# Patient Record
Sex: FEMALE | Race: WHITE | NOT HISPANIC OR LATINO | Employment: FULL TIME | URBAN - METROPOLITAN AREA
[De-identification: names, ages, dates, MRNs, and addresses within clinical notes are randomized per-mention and may not be internally consistent; named-entity substitution may affect disease eponyms.]

---

## 2017-01-04 RX ORDER — IBUPROFEN 400 MG/1
TABLET ORAL
Qty: 120 TABLET | Refills: 0 | OUTPATIENT
Start: 2017-01-04

## 2017-01-11 RX ORDER — IBUPROFEN 400 MG/1
TABLET ORAL
Qty: 120 TABLET | Refills: 0 | OUTPATIENT
Start: 2017-01-11

## 2017-02-20 ENCOUNTER — TELEPHONE (OUTPATIENT)
Dept: INTERNAL MEDICINE | Age: 52
End: 2017-02-20

## 2017-02-20 DIAGNOSIS — M54.30 SCIATICA, UNSPECIFIED LATERALITY: ICD-10-CM

## 2017-02-20 DIAGNOSIS — M48.00 SPINAL STENOSIS, UNSPECIFIED SPINAL REGION: Primary | ICD-10-CM

## 2017-02-20 DIAGNOSIS — M13.0 HYPERTROPHIC POLYARTHRITIS: ICD-10-CM

## 2017-02-20 NOTE — TELEPHONE ENCOUNTER
PA for diclofenac 1% gel denied via [employer]. Preferred alternative Brand name Voltaren 1% gel ok'd per Dr. Benedict.    KD

## 2017-10-27 ENCOUNTER — OFFICE (OUTPATIENT)
Dept: URBAN - METROPOLITAN AREA CLINIC 64 | Facility: CLINIC | Age: 52
End: 2017-10-27

## 2017-10-27 VITALS
SYSTOLIC BLOOD PRESSURE: 109 MMHG | DIASTOLIC BLOOD PRESSURE: 71 MMHG | WEIGHT: 199 LBS | HEART RATE: 80 BPM | HEIGHT: 68 IN

## 2017-10-27 DIAGNOSIS — K59.00 CONSTIPATION, UNSPECIFIED: ICD-10-CM

## 2017-10-27 DIAGNOSIS — K62.5 HEMORRHAGE OF ANUS AND RECTUM: ICD-10-CM

## 2017-10-27 DIAGNOSIS — K64.4 RESIDUAL HEMORRHOIDAL SKIN TAGS: ICD-10-CM

## 2017-10-27 PROCEDURE — 99203 OFFICE O/P NEW LOW 30 MIN: CPT | Performed by: NURSE PRACTITIONER

## 2017-11-09 ENCOUNTER — ON CAMPUS - OUTPATIENT (OUTPATIENT)
Dept: URBAN - METROPOLITAN AREA HOSPITAL 85 | Facility: HOSPITAL | Age: 52
End: 2017-11-09
Payer: COMMERCIAL

## 2017-11-09 ENCOUNTER — HOSPITAL ENCOUNTER (OUTPATIENT)
Dept: PREOP | Facility: HOSPITAL | Age: 52
Setting detail: HOSPITAL OUTPATIENT SURGERY
Discharge: HOME | End: 2017-11-09
Attending: INTERNAL MEDICINE | Admitting: INTERNAL MEDICINE

## 2017-11-09 DIAGNOSIS — K62.1 RECTAL POLYP: ICD-10-CM

## 2017-11-09 DIAGNOSIS — K64.4 RESIDUAL HEMORRHOIDAL SKIN TAGS: ICD-10-CM

## 2017-11-09 DIAGNOSIS — K64.8 OTHER HEMORRHOIDS: ICD-10-CM

## 2017-11-09 DIAGNOSIS — Z12.11 ENCOUNTER FOR SCREENING FOR MALIGNANT NEOPLASM OF COLON: ICD-10-CM

## 2017-11-09 PROCEDURE — 45385 COLONOSCOPY W/LESION REMOVAL: CPT | Performed by: INTERNAL MEDICINE

## 2018-05-23 ENCOUNTER — HOSPITAL ENCOUNTER (OUTPATIENT)
Dept: LAB | Facility: HOSPITAL | Age: 53
Discharge: HOME | End: 2018-05-23
Attending: OBSTETRICS & GYNECOLOGY | Admitting: OBSTETRICS & GYNECOLOGY

## 2018-05-24 ENCOUNTER — HOSPITAL ENCOUNTER (OUTPATIENT)
Dept: LAB | Facility: HOSPITAL | Age: 53
Discharge: HOME | End: 2018-05-24
Attending: OBSTETRICS & GYNECOLOGY | Admitting: OBSTETRICS & GYNECOLOGY

## 2018-05-24 LAB
BASOPHILS # BLD AUTO: 0 10*3/UL (ref 0–0.2)
BASOPHILS NFR BLD AUTO: 0 % (ref 0–2)
DIFFERENTIAL METHOD BLD: (no result)
EOSINOPHIL # BLD AUTO: 0.3 10*3/UL (ref 0–0.3)
EOSINOPHIL # BLD AUTO: 3 % (ref 0–3)
ERYTHROCYTE [DISTWIDTH] IN BLOOD BY AUTOMATED COUNT: 14.3 % (ref 11.5–14.5)
HCT VFR BLD AUTO: 41.6 % (ref 35–49)
HGB BLD-MCNC: 14 G/DL (ref 12–15)
LYMPHOCYTES # BLD AUTO: 2.8 10*3/UL (ref 0.8–4.8)
LYMPHOCYTES NFR BLD AUTO: 26 % (ref 18–42)
MCH RBC QN AUTO: 31.2 PG (ref 26–32)
MCHC RBC AUTO-ENTMCNC: 33.6 G/DL (ref 32–36)
MCV RBC AUTO: 92.9 FL (ref 80–94)
MONOCYTES # BLD AUTO: 0.7 10*3/UL (ref 0.1–1.3)
MONOCYTES NFR BLD AUTO: 7 % (ref 2–11)
NEUTROPHILS # BLD AUTO: 7 10*3/UL (ref 2.3–8.6)
NEUTROPHILS NFR BLD AUTO: 64 % (ref 50–75)
NRBC BLD AUTO-RTO: 0 /100{WBCS}
NRBC/RBC NFR BLD MANUAL: 0 10*3/UL
PLATELET # BLD AUTO: 299 10*3/UL (ref 150–450)
PMV BLD AUTO: 8.4 FL (ref 7.4–10.4)
RBC # BLD AUTO: 4.48 10*6/UL (ref 4–5.4)
WBC # BLD AUTO: 10.9 10*3/UL (ref 4.5–11.5)

## 2018-06-08 ENCOUNTER — APPOINTMENT (OUTPATIENT)
Dept: WOMENS IMAGING | Facility: HOSPITAL | Age: 53
End: 2018-06-08

## 2018-06-08 PROCEDURE — 77067 SCR MAMMO BI INCL CAD: CPT | Performed by: RADIOLOGY

## 2020-06-29 ENCOUNTER — LAB (OUTPATIENT)
Dept: LAB | Facility: HOSPITAL | Age: 55
End: 2020-06-29

## 2020-06-29 ENCOUNTER — TRANSCRIBE ORDERS (OUTPATIENT)
Dept: LAB | Facility: HOSPITAL | Age: 55
End: 2020-06-29

## 2020-06-29 DIAGNOSIS — N92.1 IRREGULAR INTERMENSTRUAL BLEEDING: Primary | ICD-10-CM

## 2020-06-29 DIAGNOSIS — N92.1 IRREGULAR INTERMENSTRUAL BLEEDING: ICD-10-CM

## 2020-06-29 LAB
BASOPHILS # BLD AUTO: 0.06 10*3/MM3 (ref 0–0.2)
BASOPHILS NFR BLD AUTO: 0.4 % (ref 0–1.5)
DEPRECATED RDW RBC AUTO: 44.2 FL (ref 37–54)
EOSINOPHIL # BLD AUTO: 0.2 10*3/MM3 (ref 0–0.4)
EOSINOPHIL NFR BLD AUTO: 1.5 % (ref 0.3–6.2)
ERYTHROCYTE [DISTWIDTH] IN BLOOD BY AUTOMATED COUNT: 13.1 % (ref 12.3–15.4)
HCT VFR BLD AUTO: 43.4 % (ref 34–46.6)
HGB BLD-MCNC: 14.9 G/DL (ref 12–15.9)
IMM GRANULOCYTES # BLD AUTO: 0.1 10*3/MM3 (ref 0–0.05)
IMM GRANULOCYTES NFR BLD AUTO: 0.7 % (ref 0–0.5)
LYMPHOCYTES # BLD AUTO: 2.99 10*3/MM3 (ref 0.7–3.1)
LYMPHOCYTES NFR BLD AUTO: 21.7 % (ref 19.6–45.3)
MCH RBC QN AUTO: 31.8 PG (ref 26.6–33)
MCHC RBC AUTO-ENTMCNC: 34.3 G/DL (ref 31.5–35.7)
MCV RBC AUTO: 92.7 FL (ref 79–97)
MONOCYTES # BLD AUTO: 0.73 10*3/MM3 (ref 0.1–0.9)
MONOCYTES NFR BLD AUTO: 5.3 % (ref 5–12)
NEUTROPHILS NFR BLD AUTO: 70.4 % (ref 42.7–76)
NEUTROPHILS NFR BLD AUTO: 9.67 10*3/MM3 (ref 1.7–7)
NRBC BLD AUTO-RTO: 0 /100 WBC (ref 0–0.2)
PLATELET # BLD AUTO: 287 10*3/MM3 (ref 140–450)
PMV BLD AUTO: 12.1 FL (ref 6–12)
RBC # BLD AUTO: 4.68 10*6/MM3 (ref 3.77–5.28)
WBC # BLD AUTO: 13.75 10*3/MM3 (ref 3.4–10.8)

## 2020-06-29 PROCEDURE — 36415 COLL VENOUS BLD VENIPUNCTURE: CPT

## 2020-06-29 PROCEDURE — 85025 COMPLETE CBC W/AUTO DIFF WBC: CPT

## 2020-07-14 ENCOUNTER — APPOINTMENT (OUTPATIENT)
Dept: WOMENS IMAGING | Facility: HOSPITAL | Age: 55
End: 2020-07-14

## 2020-07-14 PROCEDURE — 77063 BREAST TOMOSYNTHESIS BI: CPT | Performed by: RADIOLOGY

## 2020-07-14 PROCEDURE — 77067 SCR MAMMO BI INCL CAD: CPT | Performed by: RADIOLOGY

## 2020-08-22 ENCOUNTER — HOSPITAL ENCOUNTER (INPATIENT)
Facility: HOSPITAL | Age: 55
LOS: 2 days | Discharge: HOME | End: 2020-08-24
Attending: EMERGENCY MEDICINE | Admitting: INTERNAL MEDICINE

## 2020-08-22 DIAGNOSIS — N39.0 URINARY TRACT INFECTION WITHOUT HEMATURIA, SITE UNSPECIFIED: ICD-10-CM

## 2020-08-22 DIAGNOSIS — E11.9 DIABETES MELLITUS, NEW ONSET (HCC): Primary | ICD-10-CM

## 2020-08-22 LAB
ALBUMIN SERPL-MCNC: 4.1 G/DL (ref 3.5–5.2)
ALBUMIN/GLOB SERPL: 1.7 G/DL
ALP SERPL-CCNC: 110 U/L (ref 39–117)
ALT SERPL W P-5'-P-CCNC: 11 U/L (ref 1–33)
ANION GAP SERPL CALCULATED.3IONS-SCNC: 19 MMOL/L (ref 5–15)
AST SERPL-CCNC: 11 U/L (ref 1–32)
ATMOSPHERIC PRESS: ABNORMAL MM[HG]
BACTERIA UR QL AUTO: ABNORMAL /HPF
BASE EXCESS BLDV CALC-SCNC: -3 MMOL/L
BASOPHILS # BLD AUTO: 0.1 10*3/MM3 (ref 0–0.2)
BASOPHILS NFR BLD AUTO: 0.8 % (ref 0–1.5)
BDY SITE: ABNORMAL
BILIRUB SERPL-MCNC: 0.3 MG/DL (ref 0–1.2)
BILIRUB UR QL STRIP: NEGATIVE
BUN SERPL-MCNC: 13 MG/DL (ref 6–20)
BUN SERPL-MCNC: ABNORMAL MG/DL
BUN/CREAT SERPL: ABNORMAL
CALCIUM SPEC-SCNC: 9.2 MG/DL (ref 8.6–10.5)
CHLORIDE SERPL-SCNC: 90 MMOL/L (ref 98–107)
CLARITY UR: CLEAR
CO2 BLDA-SCNC: 21.9 MMOL/L (ref 22–29)
CO2 SERPL-SCNC: 18 MMOL/L (ref 22–29)
COLOR UR: YELLOW
CREAT SERPL-MCNC: 0.65 MG/DL (ref 0.57–1)
DEPRECATED RDW RBC AUTO: 44.2 FL (ref 37–54)
EOSINOPHIL # BLD AUTO: 0.3 10*3/MM3 (ref 0–0.4)
EOSINOPHIL NFR BLD AUTO: 2.5 % (ref 0.3–6.2)
ERYTHROCYTE [DISTWIDTH] IN BLOOD BY AUTOMATED COUNT: 13.7 % (ref 12.3–15.4)
GFR SERPL CREATININE-BSD FRML MDRD: 95 ML/MIN/1.73
GLOBULIN UR ELPH-MCNC: 2.4 GM/DL
GLUCOSE BLDC GLUCOMTR-MCNC: 339 MG/DL (ref 70–105)
GLUCOSE BLDC GLUCOMTR-MCNC: 437 MG/DL (ref 70–105)
GLUCOSE SERPL-MCNC: 415 MG/DL (ref 65–99)
GLUCOSE UR STRIP-MCNC: ABNORMAL MG/DL
HCO3 BLDV-SCNC: 20.9 MMOL/L
HCT VFR BLD AUTO: 46.7 % (ref 34–46.6)
HGB BLD-MCNC: 16 G/DL (ref 12–15.9)
HGB UR QL STRIP.AUTO: NEGATIVE
HYALINE CASTS UR QL AUTO: ABNORMAL /LPF
KETONES UR QL STRIP: ABNORMAL
LEUKOCYTE ESTERASE UR QL STRIP.AUTO: NEGATIVE
LYMPHOCYTES # BLD AUTO: 3.5 10*3/MM3 (ref 0.7–3.1)
LYMPHOCYTES NFR BLD AUTO: 30.3 % (ref 19.6–45.3)
MCH RBC QN AUTO: 31.4 PG (ref 26.6–33)
MCHC RBC AUTO-ENTMCNC: 34.2 G/DL (ref 31.5–35.7)
MCV RBC AUTO: 91.9 FL (ref 79–97)
MODALITY: ABNORMAL
MONOCYTES # BLD AUTO: 0.7 10*3/MM3 (ref 0.1–0.9)
MONOCYTES NFR BLD AUTO: 6 % (ref 5–12)
NEUTROPHILS NFR BLD AUTO: 6.9 10*3/MM3 (ref 1.7–7)
NEUTROPHILS NFR BLD AUTO: 60.4 % (ref 42.7–76)
NITRITE UR QL STRIP: NEGATIVE
NRBC BLD AUTO-RTO: 0.1 /100 WBC (ref 0–0.2)
PCO2 BLDV: 33.6 MM HG (ref 42–51)
PH BLDV: 7.4 PH UNITS (ref 7.32–7.43)
PH UR STRIP.AUTO: <=5 [PH] (ref 5–8)
PLATELET # BLD AUTO: 311 10*3/MM3 (ref 140–450)
PMV BLD AUTO: 8.9 FL (ref 6–12)
PO2 BLDV: 42.2 MM HG
POTASSIUM SERPL-SCNC: 3.8 MMOL/L (ref 3.5–5.2)
PROT SERPL-MCNC: 6.5 G/DL (ref 6–8.5)
PROT UR QL STRIP: ABNORMAL
RBC # BLD AUTO: 5.08 10*6/MM3 (ref 3.77–5.28)
RBC # UR: ABNORMAL /HPF
REF LAB TEST METHOD: ABNORMAL
SAO2 % BLDCOV: 78.3 %
SODIUM SERPL-SCNC: 127 MMOL/L (ref 136–145)
SP GR UR STRIP: 1.04 (ref 1–1.03)
SQUAMOUS #/AREA URNS HPF: ABNORMAL /HPF
TROPONIN T SERPL-MCNC: <0.01 NG/ML (ref 0–0.03)
UROBILINOGEN UR QL STRIP: ABNORMAL
WBC # BLD AUTO: 11.5 10*3/MM3 (ref 3.4–10.8)
WBC UR QL AUTO: ABNORMAL /HPF

## 2020-08-22 PROCEDURE — 82962 GLUCOSE BLOOD TEST: CPT

## 2020-08-22 PROCEDURE — 25010000002 CEFTRIAXONE PER 250 MG: Performed by: EMERGENCY MEDICINE

## 2020-08-22 PROCEDURE — 81001 URINALYSIS AUTO W/SCOPE: CPT | Performed by: EMERGENCY MEDICINE

## 2020-08-22 PROCEDURE — 63710000001 INSULIN REGULAR HUMAN PER 5 UNITS: Performed by: EMERGENCY MEDICINE

## 2020-08-22 PROCEDURE — 36415 COLL VENOUS BLD VENIPUNCTURE: CPT

## 2020-08-22 PROCEDURE — 99222 1ST HOSP IP/OBS MODERATE 55: CPT | Performed by: NURSE PRACTITIONER

## 2020-08-22 PROCEDURE — 99284 EMERGENCY DEPT VISIT MOD MDM: CPT

## 2020-08-22 PROCEDURE — 84484 ASSAY OF TROPONIN QUANT: CPT | Performed by: EMERGENCY MEDICINE

## 2020-08-22 PROCEDURE — 82803 BLOOD GASES ANY COMBINATION: CPT

## 2020-08-22 PROCEDURE — 80053 COMPREHEN METABOLIC PANEL: CPT | Performed by: EMERGENCY MEDICINE

## 2020-08-22 PROCEDURE — 83036 HEMOGLOBIN GLYCOSYLATED A1C: CPT | Performed by: NURSE PRACTITIONER

## 2020-08-22 PROCEDURE — 85025 COMPLETE CBC W/AUTO DIFF WBC: CPT | Performed by: EMERGENCY MEDICINE

## 2020-08-22 PROCEDURE — 87086 URINE CULTURE/COLONY COUNT: CPT | Performed by: EMERGENCY MEDICINE

## 2020-08-22 PROCEDURE — 93005 ELECTROCARDIOGRAM TRACING: CPT | Performed by: EMERGENCY MEDICINE

## 2020-08-22 RX ORDER — ONDANSETRON 2 MG/ML
4 INJECTION INTRAMUSCULAR; INTRAVENOUS EVERY 6 HOURS PRN
Status: DISCONTINUED | OUTPATIENT
Start: 2020-08-22 | End: 2020-08-24 | Stop reason: HOSPADM

## 2020-08-22 RX ORDER — NICOTINE POLACRILEX 4 MG
15 LOZENGE BUCCAL
Status: DISCONTINUED | OUTPATIENT
Start: 2020-08-22 | End: 2020-08-24 | Stop reason: HOSPADM

## 2020-08-22 RX ORDER — SODIUM CHLORIDE 0.9 % (FLUSH) 0.9 %
10 SYRINGE (ML) INJECTION AS NEEDED
Status: DISCONTINUED | OUTPATIENT
Start: 2020-08-22 | End: 2020-08-24 | Stop reason: HOSPADM

## 2020-08-22 RX ORDER — ACETAMINOPHEN 160 MG/5ML
650 SOLUTION ORAL EVERY 4 HOURS PRN
Status: DISCONTINUED | OUTPATIENT
Start: 2020-08-22 | End: 2020-08-24 | Stop reason: HOSPADM

## 2020-08-22 RX ORDER — CHOLECALCIFEROL (VITAMIN D3) 125 MCG
5 CAPSULE ORAL NIGHTLY PRN
Status: DISCONTINUED | OUTPATIENT
Start: 2020-08-22 | End: 2020-08-24 | Stop reason: HOSPADM

## 2020-08-22 RX ORDER — ALUMINA, MAGNESIA, AND SIMETHICONE 2400; 2400; 240 MG/30ML; MG/30ML; MG/30ML
15 SUSPENSION ORAL EVERY 6 HOURS PRN
Status: DISCONTINUED | OUTPATIENT
Start: 2020-08-22 | End: 2020-08-24 | Stop reason: HOSPADM

## 2020-08-22 RX ORDER — SODIUM CHLORIDE 0.9 % (FLUSH) 0.9 %
10 SYRINGE (ML) INJECTION EVERY 12 HOURS SCHEDULED
Status: DISCONTINUED | OUTPATIENT
Start: 2020-08-22 | End: 2020-08-24 | Stop reason: HOSPADM

## 2020-08-22 RX ORDER — ADHESIVE TAPE 3"X 2.3 YD
200 TAPE, NON-MEDICATED TOPICAL DAILY
COMMUNITY
End: 2021-03-18

## 2020-08-22 RX ORDER — DEXTROSE MONOHYDRATE 25 G/50ML
25 INJECTION, SOLUTION INTRAVENOUS
Status: DISCONTINUED | OUTPATIENT
Start: 2020-08-22 | End: 2020-08-24 | Stop reason: HOSPADM

## 2020-08-22 RX ORDER — ACETAMINOPHEN 650 MG/1
650 SUPPOSITORY RECTAL EVERY 4 HOURS PRN
Status: DISCONTINUED | OUTPATIENT
Start: 2020-08-22 | End: 2020-08-24 | Stop reason: HOSPADM

## 2020-08-22 RX ORDER — ACETAMINOPHEN 325 MG/1
650 TABLET ORAL EVERY 4 HOURS PRN
Status: DISCONTINUED | OUTPATIENT
Start: 2020-08-22 | End: 2020-08-24 | Stop reason: HOSPADM

## 2020-08-22 RX ORDER — ONDANSETRON 4 MG/1
4 TABLET, FILM COATED ORAL EVERY 6 HOURS PRN
Status: DISCONTINUED | OUTPATIENT
Start: 2020-08-22 | End: 2020-08-24 | Stop reason: HOSPADM

## 2020-08-22 RX ORDER — BISACODYL 5 MG/1
5 TABLET, DELAYED RELEASE ORAL DAILY PRN
Status: DISCONTINUED | OUTPATIENT
Start: 2020-08-22 | End: 2020-08-24 | Stop reason: HOSPADM

## 2020-08-22 RX ADMIN — SODIUM CHLORIDE 1000 ML: 900 INJECTION, SOLUTION INTRAVENOUS at 22:18

## 2020-08-22 RX ADMIN — WATER 1 G: 1000 INJECTION, SOLUTION INTRAVENOUS at 22:30

## 2020-08-22 RX ADMIN — SODIUM CHLORIDE 1000 ML: 900 INJECTION, SOLUTION INTRAVENOUS at 19:50

## 2020-08-22 RX ADMIN — INSULIN HUMAN 8 UNITS: 100 INJECTION, SOLUTION PARENTERAL at 22:17

## 2020-08-23 LAB
ANION GAP SERPL CALCULATED.3IONS-SCNC: 16 MMOL/L (ref 5–15)
BUN SERPL-MCNC: 11 MG/DL (ref 6–20)
BUN SERPL-MCNC: ABNORMAL MG/DL
BUN/CREAT SERPL: ABNORMAL
CALCIUM SPEC-SCNC: 7.9 MG/DL (ref 8.6–10.5)
CHLORIDE SERPL-SCNC: 100 MMOL/L (ref 98–107)
CO2 SERPL-SCNC: 18 MMOL/L (ref 22–29)
CREAT SERPL-MCNC: 0.56 MG/DL (ref 0.57–1)
DEPRECATED RDW RBC AUTO: 45.1 FL (ref 37–54)
EOSINOPHIL # BLD MANUAL: 0.56 10*3/MM3 (ref 0–0.4)
EOSINOPHIL NFR BLD MANUAL: 5 % (ref 0.3–6.2)
ERYTHROCYTE [DISTWIDTH] IN BLOOD BY AUTOMATED COUNT: 13.9 % (ref 12.3–15.4)
GFR SERPL CREATININE-BSD FRML MDRD: 113 ML/MIN/1.73
GLUCOSE BLDC GLUCOMTR-MCNC: 275 MG/DL (ref 70–105)
GLUCOSE BLDC GLUCOMTR-MCNC: 281 MG/DL (ref 70–105)
GLUCOSE BLDC GLUCOMTR-MCNC: 281 MG/DL (ref 70–105)
GLUCOSE BLDC GLUCOMTR-MCNC: 295 MG/DL (ref 70–105)
GLUCOSE BLDC GLUCOMTR-MCNC: 334 MG/DL (ref 70–105)
GLUCOSE BLDC GLUCOMTR-MCNC: 385 MG/DL (ref 70–105)
GLUCOSE SERPL-MCNC: 299 MG/DL (ref 65–99)
HCT VFR BLD AUTO: 39.6 % (ref 34–46.6)
HGB BLD-MCNC: 13.3 G/DL (ref 12–15.9)
KETONES UR QL STRIP: ABNORMAL
LARGE PLATELETS: ABNORMAL
LYMPHOCYTES # BLD MANUAL: 2.69 10*3/MM3 (ref 0.7–3.1)
LYMPHOCYTES NFR BLD MANUAL: 24 % (ref 19.6–45.3)
LYMPHOCYTES NFR BLD MANUAL: 5 % (ref 5–12)
MCH RBC QN AUTO: 30.9 PG (ref 26.6–33)
MCHC RBC AUTO-ENTMCNC: 33.5 G/DL (ref 31.5–35.7)
MCV RBC AUTO: 92.1 FL (ref 79–97)
MONOCYTES # BLD AUTO: 0.56 10*3/MM3 (ref 0.1–0.9)
NEUTROPHILS # BLD AUTO: 7.39 10*3/MM3 (ref 1.7–7)
NEUTROPHILS NFR BLD MANUAL: 58 % (ref 42.7–76)
NEUTS BAND NFR BLD MANUAL: 8 % (ref 0–5)
PLATELET # BLD AUTO: 225 10*3/MM3 (ref 140–450)
PMV BLD AUTO: 8.8 FL (ref 6–12)
POTASSIUM SERPL-SCNC: 3.6 MMOL/L (ref 3.5–5.2)
RBC # BLD AUTO: 4.29 10*6/MM3 (ref 3.77–5.28)
RBC MORPH BLD: NORMAL
SCAN SLIDE: NORMAL
SODIUM SERPL-SCNC: 134 MMOL/L (ref 136–145)
TOXIC GRANULATION: ABNORMAL
WBC # BLD AUTO: 11.2 10*3/MM3 (ref 3.4–10.8)

## 2020-08-23 PROCEDURE — 85025 COMPLETE CBC W/AUTO DIFF WBC: CPT | Performed by: NURSE PRACTITIONER

## 2020-08-23 PROCEDURE — 99232 SBSQ HOSP IP/OBS MODERATE 35: CPT | Performed by: INTERNAL MEDICINE

## 2020-08-23 PROCEDURE — 25010000002 CEFTRIAXONE PER 250 MG: Performed by: INTERNAL MEDICINE

## 2020-08-23 PROCEDURE — 82962 GLUCOSE BLOOD TEST: CPT

## 2020-08-23 PROCEDURE — 63710000001 INSULIN LISPRO (HUMAN) PER 5 UNITS: Performed by: NURSE PRACTITIONER

## 2020-08-23 PROCEDURE — 63710000001 INSULIN LISPRO (HUMAN) PER 5 UNITS: Performed by: INTERNAL MEDICINE

## 2020-08-23 PROCEDURE — 81003 URINALYSIS AUTO W/O SCOPE: CPT | Performed by: INTERNAL MEDICINE

## 2020-08-23 PROCEDURE — 80048 BASIC METABOLIC PNL TOTAL CA: CPT | Performed by: NURSE PRACTITIONER

## 2020-08-23 PROCEDURE — 63710000001 INSULIN GLARGINE PER 5 UNITS: Performed by: INTERNAL MEDICINE

## 2020-08-23 PROCEDURE — 85007 BL SMEAR W/DIFF WBC COUNT: CPT | Performed by: NURSE PRACTITIONER

## 2020-08-23 PROCEDURE — 99253 IP/OBS CNSLTJ NEW/EST LOW 45: CPT | Performed by: INTERNAL MEDICINE

## 2020-08-23 RX ORDER — SODIUM CHLORIDE 9 MG/ML
75 INJECTION, SOLUTION INTRAVENOUS CONTINUOUS
Status: DISCONTINUED | OUTPATIENT
Start: 2020-08-23 | End: 2020-08-24 | Stop reason: HOSPADM

## 2020-08-23 RX ORDER — INSULIN GLARGINE 100 [IU]/ML
15 INJECTION, SOLUTION SUBCUTANEOUS EVERY MORNING
Status: DISCONTINUED | OUTPATIENT
Start: 2020-08-23 | End: 2020-08-24 | Stop reason: HOSPADM

## 2020-08-23 RX ORDER — INSULIN GLARGINE 100 [IU]/ML
10 INJECTION, SOLUTION SUBCUTANEOUS NIGHTLY
Status: DISCONTINUED | OUTPATIENT
Start: 2020-08-23 | End: 2020-08-23

## 2020-08-23 RX ADMIN — INSULIN LISPRO 5 UNITS: 100 INJECTION, SOLUTION INTRAVENOUS; SUBCUTANEOUS at 17:24

## 2020-08-23 RX ADMIN — Medication 10 ML: at 20:23

## 2020-08-23 RX ADMIN — SODIUM CHLORIDE 75 ML/HR: 900 INJECTION, SOLUTION INTRAVENOUS at 02:48

## 2020-08-23 RX ADMIN — Medication 10 ML: at 07:48

## 2020-08-23 RX ADMIN — INSULIN LISPRO 6 UNITS: 100 INJECTION, SOLUTION INTRAVENOUS; SUBCUTANEOUS at 07:47

## 2020-08-23 RX ADMIN — INSULIN GLARGINE 15 UNITS: 100 INJECTION, SOLUTION SUBCUTANEOUS at 14:43

## 2020-08-23 RX ADMIN — INSULIN LISPRO 6 UNITS: 100 INJECTION, SOLUTION INTRAVENOUS; SUBCUTANEOUS at 12:05

## 2020-08-23 RX ADMIN — INSULIN LISPRO 6 UNITS: 100 INJECTION, SOLUTION INTRAVENOUS; SUBCUTANEOUS at 17:25

## 2020-08-23 RX ADMIN — CEFTRIAXONE SODIUM 1 G: 1 INJECTION, POWDER, FOR SOLUTION INTRAMUSCULAR; INTRAVENOUS at 20:23

## 2020-08-23 RX ADMIN — SODIUM CHLORIDE 75 ML/HR: 900 INJECTION, SOLUTION INTRAVENOUS at 16:41

## 2020-08-23 RX ADMIN — BISACODYL 5 MG: 5 TABLET, COATED ORAL at 20:23

## 2020-08-23 NOTE — H&P
[facility] Medicine Services      Patient Name: Yadira Car  : 1965  MRN: [id number]  Primary Care Physician: Josesito, No Known  Date of admission: 2020    Patient Care Team:  Provider, No Known as PCP - General          Subjective   History Present Illness     Chief Complaint:   Chief Complaint   Patient presents with   • Hyperglycemia         Ms. Car is a 54 y.o. female with a history of abnormal uterine bleeding who was recently placed on progesterone p.o. presented to the [facility] ED on 2020 with a complaint of hyperglycemia, polydipsia and polyuria.  Patient states that she was on progesterone x21 days per her OB/GYN, for abnormal uterine bleeding.  Patient states she has had increased thirst and increased urination since she has been on progesterone.  Patient states she took her blood sugar at a friend's house yesterday and the glucometer read high.  Patient does have a family history of diabetes on her mom's side.  Patient denies fever, chills, nausea, vomiting, diarrhea, or ill contacts.    In the ED, glucose 437, troponin<0.01, sodium 127, potassium 3.8, CO2 18.0, anion gap 19.0, BUN 13, creatinine 0.65, WBC 11.5, Hgb 16.0, HCT 46.7, platelets 311.  UA specific gravity 1.041, glucose 3+, ketones 2+, protein 2+, 21-30 WBC, trace bacteria, urine culture pending.  EKG: Normal sinus rhythm.  pH 7.402.  Patient received 2 L normal saline IV bolus, 8 units regular insulin, 1 g Rocephin IV.      Review of Systems   Constitution: Negative.   HENT: Negative.    Eyes: Negative.    Cardiovascular: Negative.    Respiratory: Negative.    Endocrine: Negative.    Hematologic/Lymphatic: Negative.    Skin: Negative.    Musculoskeletal: Negative.    Gastrointestinal:        Polydipsia   Genitourinary: Positive for frequency.   Neurological: Negative.    Psychiatric/Behavioral: Negative.    Allergic/Immunologic: Negative.    All other systems reviewed and are  negative.          Personal History     Past Medical History:   Past Medical History:   Diagnosis Date   • Allergic    • Renal calculi     from calcium   • Spinal stenosis        Surgical History:    History reviewed. No pertinent surgical history.        Family History: family history includes Arthritis in her mother; Diabetes in her father and mother; Heart disease in her mother; Hypertension in her father; No Known Problems in her brother; Stroke in her father. Otherwise pertinent FHx was reviewed and unremarkable.     Social History:  reports that she has been smoking. She does not have any smokeless tobacco history on file. She reports that she drinks alcohol. She reports that she does not use drugs.      Medications:  Prior to Admission medications    Medication Sig Start Date End Date Taking? Authorizing Provider   Magnesium Oxide (Mag-200) 200 MG tablet Take 200 mg by mouth Daily.   Yes ProviderNissa MD   NON FORMULARY Sovereign silver   Yes Provider, MD Nissa   cyclobenzaprine (FLEXERIL) 10 MG tablet Take 1 tablet (10 mg total) by mouth 3 (three) times a day as needed for muscle spasms. 1 to 3 times a day when necessary-muscle spasms 1/29/16 8/22/20  De Benedict MD   ibuprofen (ADVIL,MOTRIN) 400 MG tablet Ashley Sánchez LPN 10/11/16 8/22/20  De Benedict MD   VOLTAREN 1 % gel gel APPLY TOPICALLY TWO TIMES A DAY 2/20/17 8/22/20  De Benedict MD       Allergies:    Allergies   Allergen Reactions   • Blue Dyes (Parenteral)        Objective   Objective     Vital Signs  Temp:  [98.1 °F (36.7 °C)] 98.1 °F (36.7 °C)  Heart Rate:  [82-99] 82  Resp:  [15-18] 15  BP: (122-153)/(77-90) 122/77  SpO2:  [97 %-99 %] 99 %  on   ;      Body mass index is 27.99 kg/m².    Physical Exam   Constitutional: She is oriented to person, place, and time. She appears well-developed and well-nourished.   HENT:   Head: Normocephalic.   Eyes: Conjunctivae are normal.   Neck: Normal range of motion.      Cardiovascular: Normal rate, regular rhythm, normal heart sounds and intact distal pulses.   Pulmonary/Chest: Effort normal and breath sounds normal.   Abdominal: Soft. Bowel sounds are normal.   Musculoskeletal: Normal range of motion. She exhibits no edema.   Neurological: She is alert and oriented to person, place, and time.   Skin: Skin is warm. Capillary refill takes less than 2 seconds.   Vitals reviewed.      Results Review:  I have personally reviewed most recent cardiac tracings, lab results and radiology images and interpretations and agree with findings    Results from last 7 days   Lab Units 08/22/20 1936   WBC 10*3/mm3 11.50*   HEMOGLOBIN g/dL 16.0*   HEMATOCRIT % 46.7*   PLATELETS 10*3/mm3 311     Results from last 7 days   Lab Units 08/22/20 2020   SODIUM mmol/L 127*   POTASSIUM mmol/L 3.8   CHLORIDE mmol/L 90*   CO2 mmol/L 18.0*   BUN mg/dL 13   CREATININE mg/dL 0.65   GLUCOSE mg/dL 415*   CALCIUM mg/dL 9.2   ALT (SGPT) U/L 11   AST (SGOT) U/L 11   TROPONIN T ng/mL <0.010     Estimated Creatinine Clearance: 113.3 mL/min (by C-G formula based on SCr of 0.65 mg/dL).  Brief Urine Lab Results  (Last result in the past 365 days)      Color   Clarity   Blood   Leuk Est   Nitrite   Protein   CREAT   Urine HCG        08/22/20 1935 Yellow Clear Negative Negative Negative 100 mg/dL (2+)               Microbiology Results (last 10 days)     ** No results found for the last 240 hours. **          ECG/EMG Results (most recent)     Procedure Component Value Units Date/Time    ECG 12 Lead [[id number]] Collected:  08/22/20 1954     Updated:  08/22/20 1956    Narrative:       HEART RATE= 83  bpm  RR Interval= 724  ms  SC Interval= 142  ms  P Horizontal Axis= 6  deg  P Front Axis= 53  deg  QRSD Interval= 65  ms  QT Interval= 371  ms  QRS Axis= 52  deg  T Wave Axis= 23  deg  - OTHERWISE NORMAL ECG -  Sinus rhythm  Low voltage, precordial leads  Electronically Signed By:   Date and Time of Study: 2020-08-22 19:54:25                     No radiology results for the last 7 days      Estimated Creatinine Clearance: 113.3 mL/min (by C-G formula based on SCr of 0.65 mg/dL).    Assessment/Plan   Assessment/Plan       Active Hospital Problems    Diagnosis  POA   • Diabetes mellitus, new onset (CMS/HCC) [E11.9]  Yes      Resolved Hospital Problems   No resolved problems to display.     Hyperglycemia, new onset diabetes  - Glucose 437 upon admission  - Polydipsia, frequency of urination  -Patient received 2 L normal saline bolus in ED  -NS at 75 mL/h  -Recheck BMP in a.m.  - We will check A1c  -Accu-Cheks before meals and at bedtime  -Sliding scale insulin  - Diabetes educator to see  -May benefit from endocrine consult    Urinary tract infection  -Continue Rocephin  - Follow urine culture        VTE Prophylaxis -   Mechanical Order History:      Ordered        Signed and Held  Place Sequential Compression Device  Once         Signed and Held  Maintain Sequential Compression Device  Continuous                 Pharmalogical Order History:     None          CODE STATUS:    Code Status and Medical Interventions:   Ordered at: 08/22/20 4583     Level Of Support Discussed With:    Patient     Code Status:    CPR     Medical Interventions (Level of Support Prior to Arrest):    Full           I discussed the patient's findings and my recommendations with patient.        Electronically signed by TIFFANY Roca, 08/22/20, 10:55 PM.  [facility] Hospitalist Team

## 2020-08-23 NOTE — PROGRESS NOTES
[facility] Medicine Services Daily Progress Note      Hospitalist Team  LOS 1 days      Patient Care Team:  Provider, No Known as PCP - General    Patient Location: 382/1      Subjective   Subjective     Chief Complaint / Subjective  Chief Complaint   Patient presents with   • Hyperglycemia         Brief Synopsis of Hospital Course/HPI      Ms. Car is a 54 y.o. female with a history of abnormal uterine bleeding who was recently placed on progesterone p.o. presented to the [facility] ED on 8/22/2020 with a complaint of hyperglycemia, polydipsia and polyuria.  Patient states that she was on progesterone x21 days per her OB/GYN, for abnormal uterine bleeding.  Patient states she has had increased thirst and increased urination since she has been on progesterone.  Patient states she took her blood sugar at a friend's house yesterday and the glucometer read high.  Patient does have a family history of diabetes on her mom's side.  Patient denies fever, chills, nausea, vomiting, diarrhea, or ill contacts.     In the ED, glucose 437, troponin<0.01, sodium 127, potassium 3.8, CO2 18.0, anion gap 19.0, BUN 13, creatinine 0.65, WBC 11.5, Hgb 16.0, HCT 46.7, platelets 311.  UA specific gravity 1.041, glucose 3+, ketones 2+, protein 2+, 21-30 WBC, trace bacteria, urine culture pending.  EKG: Normal sinus rhythm.  pH 7.402.  Patient received 2 L normal saline IV bolus, 8 units regular insulin, 1 g Rocephin IV.        Admitted for UTI and newly diagnosed diabetes        Date:: 8/23/2020  The patient is seen today at bedside she has no new complaints , reports increased thirst and urination        Review of Systems   Constitution: Negative.   HENT: Negative.    Cardiovascular: Negative.    Respiratory: Negative.    Skin: Negative.    Gastrointestinal: Negative.         Polydipsia   Genitourinary: Positive for frequency.   Neurological: Negative.          Objective   Objective      Vital  "Signs  Temp:  [98 °F (36.7 °C)-98.3 °F (36.8 °C)] 98 °F (36.7 °C)  Heart Rate:  [79-99] 79  Resp:  [15-18] 15  BP: (108-153)/(65-90) 108/65  Oxygen Therapy  SpO2: 96 %  Pulse Oximetry Type: Intermittent  Device (Oxygen Therapy): room air  Device (Oxygen Therapy): room air  Flowsheet Rows      First Filed Value   Admission Height  172.7 cm (68\") Documented at 08/22/2020 1910   Admission Weight  83.5 kg (184 lb 1.4 oz) Documented at 08/22/2020 1910        Intake & Output (last 3 days)       08/20 0701 - 08/21 0700 08/21 0701 - 08/22 0700 08/22 0701 - 08/23 0700 08/23 0701 - 08/24 0700    IV Piggyback   1000     Total Intake(mL/kg)   1000 (12.3)     Net   +1000                 Lines, Drains & Airways    Active LDAs     Name:   Placement date:   Placement time:   Site:   Days:    Peripheral IV 08/22/20 2136 Right Arm   08/22/20 2136    Arm   less than 1                  Physical Exam:    Physical Exam   Constitutional: She is oriented to person, place, and time.   HENT:   Head: Normocephalic and atraumatic.   Eyes: Pupils are equal, round, and reactive to light. EOM are normal.   Dry mucosa   Neck: Neck supple.   Cardiovascular: Normal rate, regular rhythm, normal heart sounds and intact distal pulses.   Pulmonary/Chest: Effort normal and breath sounds normal.   Abdominal: Soft. Bowel sounds are normal.   Neurological: She is alert and oriented to person, place, and time.   Skin: Skin is warm and dry.   Nursing note and vitals reviewed.            Results Review:     I reviewed the patient's new clinical results.      Lab Results (last 24 hours)     Procedure Component Value Units Date/Time    BUN [[id number]]  (Normal) Collected:  08/23/20 0316    Specimen:  Blood Updated:  08/23/20 0728     BUN 11 mg/dL     POC Glucose Once [[id number]]  (Abnormal) Collected:  08/23/20 0701    Specimen:  Blood Updated:  08/23/20 0702     Glucose 281 mg/dL      Comment: Serial Number: [id number]:  [id number]       Scan Slide " [[id number]] Collected:  08/23/20 0316    Specimen:  Blood Updated:  08/23/20 0651     Scan Slide --     Comment: See Manual Differential Results       Manual Differential [[id number]]  (Abnormal) Collected:  08/23/20 0316    Specimen:  Blood Updated:  08/23/20 0651     Neutrophil % 58.0 %      Lymphocyte % 24.0 %      Monocyte % 5.0 %      Eosinophil % 5.0 %      Bands %  8.0 %      Neutrophils Absolute 7.39 10*3/mm3      Lymphocytes Absolute 2.69 10*3/mm3      Monocytes Absolute 0.56 10*3/mm3      Eosinophils Absolute 0.56 10*3/mm3      RBC Morphology Normal     Toxic Granulation Slight/1+     Large Platelets Slight/1+    CBC Auto Differential [[id number]]  (Abnormal) Collected:  08/23/20 0316    Specimen:  Blood Updated:  08/23/20 0651     WBC 11.20 10*3/mm3      RBC 4.29 10*6/mm3      Hemoglobin 13.3 g/dL      Comment: Result checked         Hematocrit 39.6 %      MCV 92.1 fL      MCH 30.9 pg      MCHC 33.5 g/dL      RDW 13.9 %      RDW-SD 45.1 fl      MPV 8.8 fL      Platelets 225 10*3/mm3     Basic Metabolic Panel [[id number]]  (Abnormal) Collected:  08/23/20 0316    Specimen:  Blood Updated:  08/23/20 0454     Glucose 299 mg/dL      BUN --     Comment: Testing performed by alternate method        Creatinine 0.56 mg/dL      Sodium 134 mmol/L      Potassium 3.6 mmol/L      Comment: Specimen hemolyzed.  Results may be affected.        Chloride 100 mmol/L      Comment: Result checked         CO2 18.0 mmol/L      Calcium 7.9 mg/dL      eGFR Non African Amer 113 mL/min/1.73      BUN/Creatinine Ratio --     Comment: Testing not performed        Anion Gap 16.0 mmol/L     Narrative:       GFR Normal >60  Chronic Kidney Disease <60  Kidney Failure <15      POC Glucose Once [[id number]]  (Abnormal) Collected:  08/23/20 0005    Specimen:  Blood Updated:  08/23/20 0006     Glucose 275 mg/dL      Comment: Serial Number: [id number]:  [id number]       Hemoglobin A1c [[id number]] Collected:  08/22/20 1936    Specimen:  Blood  Updated:  08/22/20 2322    POC Glucose Once [[id number]]  (Abnormal) Collected:  08/22/20 2214    Specimen:  Blood Updated:  08/22/20 2215     Glucose 339 mg/dL      Comment: Serial Number: [id number]:  [id number]       Blood Gas, Venous [[id number]]  (Abnormal) Collected:  08/22/20 2205    Specimen:  Venous Blood Updated:  08/22/20 2209     Site Right Brachial     pH, Venous 7.402 pH Units      pCO2, Venous 33.6 mm Hg      pO2, Venous 42.2 mm Hg      HCO3, Venous 20.9 mmol/L      Base Excess, Venous -3.0 mmol/L      Comment: Serial Number: [id number]:  [id number]        O2 Saturation, Venous 78.3 %      CO2 Content 21.9 mmol/L      Barometric Pressure for Blood Gas --     Comment: N/A        Modality Room Air    Comprehensive Metabolic Panel [[id number]]  (Abnormal) Collected:  08/22/20 2020    Specimen:  Blood from Arm, Right Updated:  08/22/20 2110     Glucose 415 mg/dL      BUN --     Comment: Testing performed by alternate method        Creatinine 0.65 mg/dL      Sodium 127 mmol/L      Potassium 3.8 mmol/L      Comment: Specimen hemolyzed.  Results may be affected.        Chloride 90 mmol/L      CO2 18.0 mmol/L      Calcium 9.2 mg/dL      Total Protein 6.5 g/dL      Albumin 4.10 g/dL      ALT (SGPT) 11 U/L      AST (SGOT) 11 U/L      Comment: Specimen hemolyzed.  Results may be affected.        Alkaline Phosphatase 110 U/L      Total Bilirubin 0.3 mg/dL      eGFR Non African Amer 95 mL/min/1.73      Globulin 2.4 gm/dL      A/G Ratio 1.7 g/dL      BUN/Creatinine Ratio --     Comment: Testing not performed        Anion Gap 19.0 mmol/L     Narrative:       GFR Normal >60  Chronic Kidney Disease <60  Kidney Failure <15      BUN [[id number]]  (Normal) Collected:  08/22/20 2020    Specimen:  Blood from Arm, Right Updated:  08/22/20 2104     BUN 13 mg/dL     Troponin [[id number]]  (Normal) Collected:  08/22/20 2020    Specimen:  Blood from Arm, Right Updated:  08/22/20 2055     Troponin T <0.010 ng/mL     Narrative:        Troponin T Reference Range:  <= 0.03 ng/mL-   Negative for AMI  >0.03 ng/mL-     Abnormal for myocardial necrosis.  Clinicians would have to utilize clinical acumen, EKG, Troponin and serial changes to determine if it is an Acute Myocardial Infarction or myocardial injury due to an underlying chronic condition.       Results may be falsely decreased if patient taking Biotin.      Urinalysis, Microscopic Only - Urine, Clean Catch [[id number]]  (Abnormal) Collected:  08/22/20 1935    Specimen:  Urine, Clean Catch Updated:  08/22/20 1948     RBC, UA 0-2 /HPF      WBC, UA 21-30 /HPF      Bacteria, UA Trace /HPF      Squamous Epithelial Cells, UA 0-2 /HPF      Hyaline Casts, UA 3-6 /LPF      Methodology Automated Microscopy    Urinalysis With Culture If Indicated - Urine, Clean Catch [[id number]]  (Abnormal) Collected:  08/22/20 1935    Specimen:  Urine, Clean Catch Updated:  08/22/20 1948     Color, UA Yellow     Appearance, UA Clear     pH, UA <=5.0     Specific Gravity, UA 1.041     Glucose, UA >=1000 mg/dL (3+)     Ketones, UA 40 mg/dL (2+)     Bilirubin, UA Negative     Blood, UA Negative     Protein,  mg/dL (2+)     Leuk Esterase, UA Negative     Nitrite, UA Negative     Urobilinogen, UA 0.2 E.U./dL    Urine Culture - Urine, Urine, Clean Catch [[id number]] Collected:  08/22/20 1935    Specimen:  Urine, Clean Catch Updated:  08/22/20 1948    CBC & Differential [[id number]] Collected:  08/22/20 1936    Specimen:  Blood Updated:  08/22/20 1943    Narrative:       The following orders were created for panel order CBC & Differential.  Procedure                               Abnormality         Status                     ---------                               -----------         ------                     CBC Auto Differential[[id number]]        Abnormal            Final result                 Please view results for these tests on the individual orders.    CBC Auto Differential [[id number]]  (Abnormal) Collected:   08/22/20 1936    Specimen:  Blood Updated:  08/22/20 1943     WBC 11.50 10*3/mm3      RBC 5.08 10*6/mm3      Hemoglobin 16.0 g/dL      Hematocrit 46.7 %      MCV 91.9 fL      MCH 31.4 pg      MCHC 34.2 g/dL      RDW 13.7 %      RDW-SD 44.2 fl      MPV 8.9 fL      Platelets 311 10*3/mm3      Neutrophil % 60.4 %      Lymphocyte % 30.3 %      Monocyte % 6.0 %      Eosinophil % 2.5 %      Basophil % 0.8 %      Neutrophils, Absolute 6.90 10*3/mm3      Lymphocytes, Absolute 3.50 10*3/mm3      Monocytes, Absolute 0.70 10*3/mm3      Eosinophils, Absolute 0.30 10*3/mm3      Basophils, Absolute 0.10 10*3/mm3      nRBC 0.1 /100 WBC     POC Glucose Once [[id number]]  (Abnormal) Collected:  08/22/20 1916    Specimen:  Blood Updated:  08/22/20 1917     Glucose 437 mg/dL      Comment: Serial Number: [id number]:  [id number]           No results found for: HGBA1C            No results found for: LIPASE  Lab Results   Component Value Date    CHLPL 203 (H) 01/09/2015    TRIG 563 (H) 01/09/2015    HDL 35 (L) 01/09/2015    LDL  01/09/2015      Comment:      Unable to calculate due to elevated Triglycerides.                      LDL References Ranges  (U.S. Department of Health and Human Services ATP III Classifications)                Optimal                       <100 mg/dL                Near Optimal               100-129 mg/dL                Borderline High            130-159 mg/dL                High                             160-189 mg/dL                Very High                     >189 mg/dL         No results found for: INTRAOP, PREDX, FINALDX, COMDX    Microbiology Results (last 10 days)     ** No results found for the last 240 hours. **          ECG/EMG Results (most recent)     Procedure Component Value Units Date/Time    ECG 12 Lead [[id number]] Collected:  08/22/20 1954     Updated:  08/23/20 0818    Narrative:       HEART RATE= 83  bpm  RR Interval= 724  ms  MN Interval= 142  ms  P Horizontal Axis= 6  deg  P Front  Axis= 53  deg  QRSD Interval= 65  ms  QT Interval= 371  ms  QRS Axis= 52  deg  T Wave Axis= 23  deg  - OTHERWISE NORMAL ECG -  Sinus rhythm  Low voltage, precordial leads  No previous ECG available for comparison  Electronically Signed By: Selvin Montelongo ([facility] [place]) 23-Aug-2020 08:17:28  Date and Time of Study: 2020-08-22 19:54:25                    No radiology results for the last 7 days        Xrays, labs reviewed personally by physician.    Medication Review:   I have reviewed the patient's current medication list      Scheduled Meds    cefTRIAXone 1 g Intravenous Q24H   insulin lispro 0-9 Units Subcutaneous TID AC   sodium chloride 10 mL Intravenous Q12H       Meds Infusions    sodium chloride 75 mL/hr Last Rate: 75 mL/hr (08/23/20 0248)       Meds PRN  •  acetaminophen **OR** acetaminophen **OR** acetaminophen  •  aluminum-magnesium hydroxide-simethicone  •  bisacodyl  •  dextrose  •  dextrose  •  glucagon (human recombinant)  •  insulin lispro **AND** insulin lispro  •  magnesium hydroxide  •  melatonin  •  ondansetron **OR** ondansetron  •  [COMPLETED] Insert peripheral IV **AND** sodium chloride  •  sodium chloride        Assessment/Plan   Assessment/Plan     Active Hospital Problems    Diagnosis  POA   • Diabetes mellitus, new onset (CMS/HCC) [E11.9]  Yes      Resolved Hospital Problems   No resolved problems to display.       MEDICAL DECISION MAKING COMPLEXITY BY PROBLEM:        Ms. Car is a 54 y.o. female with a history of abnormal uterine bleeding who was recently placed on progesterone p.o. presented to the [facility] ED on 8/22/2020 with a complaint of hyperglycemia, polydipsia and polyuria.  Patient states that she was on progesterone x21 days per her OB/GYN, for abnormal uterine bleeding.  Patient states she has had increased thirst and increased urination since she has been on progesterone.  Patient states she took her blood sugar at a friend's house yesterday and the glucometer read high.   Patient does have a family history of diabetes on her mom's side.  Patient denies fever, chills, nausea, vomiting, diarrhea, or ill contacts.     In the ED, glucose 437, troponin<0.01, sodium 127, potassium 3.8, CO2 18.0, anion gap 19.0, BUN 13, creatinine 0.65, WBC 11.5, Hgb 16.0, HCT 46.7, platelets 311.  UA specific gravity 1.041, glucose 3+, ketones 2+, protein 2+, 21-30 WBC, trace bacteria, urine culture pending.  EKG: Normal sinus rhythm.  pH 7.402.  Patient received 2 L normal saline IV bolus, 8 units regular insulin, 1 g Rocephin IV.       Admitted for UTI and newly diagnosed diabetes type 2    Assessment    Newly diagnosed diabetes type 2  Glucose 437 upon admission  HbA1c in progress   Polydipsia, frequency of urination  NS at 75 mL/h  Accu-Cheks before meals and at bedtime  Sliding scale insulin  Diabetes educator to see  Endocrinology consult  Start Lantus 10 units at bedtime     Urinary tract infection  Continue Rocephin  Follow urine culture    Proteinuria  Suspect secondary to diabetes type 2  May benefit from ACE        She reports minimal portal bleed  Patient was started on Provera as outpatient  Follow-up with gynecology as outpatient    History of kidney stones  Currently asymptomatic    VTE Prophylaxis -   Mechanical Order History:      Ordered        08/22/20 2301  Place Sequential Compression Device  Once         08/22/20 2301  Maintain Sequential Compression Device  Continuous                 Pharmalogical Order History:     None            Code Status -   Code Status and Medical Interventions:   Ordered at: 08/22/20 2254     Level Of Support Discussed With:    Patient     Code Status:    CPR     Medical Interventions (Level of Support Prior to Arrest):    Full           Discharge Planning  And clinical improvement          Electronically signed by Jess Leija MD, 08/23/20, 08:49.  [facility] Hospitalist Team

## 2020-08-23 NOTE — CONSULTS
[facility]    Referring Provider:Dr. Jess Leija  Reason for Consultation: Diabetes evaluation & management.    Patient Care Team:  Provider, No Known as PCP - General    Chief complaint Hyperglycemia      Subjective .     History of present illness:    This is a  54 y.o. female with no previous history of Diabetes, seen in the ER because of blood sugar in the 400's yesterday.  C/o polyuria, polydipsia & 20 lb wt loss in the last 2 months. Also has some dizziness & decreased appetite.  Drinking mostly apple juice & milk.  Positive family history of diabetes.  Does not have a PCP.  Seeing GYN for post menopausal bleeding. Started of progesterone in June.    Review of Systems  Review of Systems   Constitutional: Positive for appetite change and unexpected weight loss.   HENT: Negative for trouble swallowing.    Eyes: Negative for blurred vision.   Respiratory: Negative for shortness of breath.    Cardiovascular: Negative for leg swelling.   Gastrointestinal: Negative for nausea.   Endocrine: Positive for polydipsia and polyuria.   Genitourinary: Negative for dysuria.        Nocturia Q 2h   Neurological: Negative for headache.       History  Past Medical History:   Diagnosis Date   • Allergic    • Diabetes mellitus (CMS/HCC)     New onset   • Renal calculi     from calcium   • Spinal stenosis      History reviewed. No pertinent surgical history.  Family History   Problem Relation Age of Onset   • Heart disease Mother    • Diabetes Mother    • Arthritis Mother    • Hypertension Father    • Diabetes Father    • Stroke Father    • No Known Problems Brother      Social History     Tobacco Use   • Smoking status: Former Smoker     Packs/day: 0.50     Years: 35.00     Pack years: 17.50   • Smokeless tobacco: Never Used   • Tobacco comment: On and Off   Substance Use Topics   • Alcohol use: Yes     Comment: once-twice/yr.   • Drug use: No     Medications Prior to Admission   Medication Sig  Dispense Refill Last Dose   • Magnesium Oxide (Mag-200) 200 MG tablet Take 200 mg by mouth Daily.      • NON FORMULARY Sovereign silver        Scheduled Meds:    cefTRIAXone 1 g Intravenous Q24H   insulin glargine 15 Units Subcutaneous QAM   insulin lispro 0-9 Units Subcutaneous TID AC   insulin lispro 5 Units Subcutaneous TID With Meals   sodium chloride 10 mL Intravenous Q12H     Continuous Infusions:    sodium chloride 75 mL/hr Last Rate: 75 mL/hr (08/23/20 1641)     PRN Meds:  •  acetaminophen **OR** acetaminophen **OR** acetaminophen  •  aluminum-magnesium hydroxide-simethicone  •  bisacodyl  •  dextrose  •  dextrose  •  glucagon (human recombinant)  •  insulin lispro **AND** insulin lispro  •  magnesium hydroxide  •  melatonin  •  ondansetron **OR** ondansetron  •  [COMPLETED] Insert peripheral IV **AND** sodium chloride  •  sodium chloride  Allergies:  Blue dyes (parenteral)    Objective     Vital Signs   Temp:  [97.4 °F (36.3 °C)-98.3 °F (36.8 °C)] 97.4 °F (36.3 °C)  Heart Rate:  [77-99] 82  Resp:  [15-18] 16  BP: (108-153)/(65-90) 120/76    Physical Exam:     General Appearance:    Alert, cooperative, in no acute distress   Head:    Normocephalic, without obvious abnormality, atraumatic   Eyes:            Lids and lashes normal, conjunctivae and sclerae normal, no   icterus, no pallor, corneas clear, PERRLA   Throat:   No oral lesions,  oral mucosa moist   Neck:   No adenopathy, supple,  no thyromegaly, no   carotid bruit   Lungs:     Clear    Heart:    Regular rhythm and normal rate   Chest Wall:    No abnormalities observed   Abdomen:     Normal bowel sounds, soft                 Extremities:   Moves all extremities well, no edema               Pulses:   Pulses palpable and equal bilaterally   Skin:   Dry. No bruising or rash   Neurologic:  DTR absent, able to feel the 10g monofilament       Results Review  I have reviewed the patient's new clinical results, labs & imaging.    Lab Results (last 24 hours)      Procedure Component Value Units Date/Time    POC Glucose Once [[id number]]  (Abnormal) Collected:  08/23/20 1600    Specimen:  Blood Updated:  08/23/20 1601     Glucose 295 mg/dL      Comment: Serial Number: [id number]:  [id number]       Urine Culture - Urine, Urine, Clean Catch [[id number]]  (Normal) Collected:  08/22/20 1935    Specimen:  Urine, Clean Catch Updated:  08/23/20 1245     Urine Culture Growth present, too young to evaluate    POC Glucose Once [[id number]]  (Abnormal) Collected:  08/23/20 1154    Specimen:  Blood Updated:  08/23/20 1157     Glucose 281 mg/dL      Comment: Serial Number: [id number]:  [id number]       POC Glucose Once [[id number]]  (Abnormal) Collected:  08/23/20 0901    Specimen:  Blood Updated:  08/23/20 0902     Glucose 385 mg/dL      Comment: Serial Number: [id number]:  [id number]       BUN [[id number]]  (Normal) Collected:  08/23/20 0316    Specimen:  Blood Updated:  08/23/20 0728     BUN 11 mg/dL     POC Glucose Once [[id number]]  (Abnormal) Collected:  08/23/20 0701    Specimen:  Blood Updated:  08/23/20 0702     Glucose 281 mg/dL      Comment: Serial Number: [id number]:  [id number]       Scan Slide [[id number]] Collected:  08/23/20 0316    Specimen:  Blood Updated:  08/23/20 0651     Scan Slide --     Comment: See Manual Differential Results       Manual Differential [[id number]]  (Abnormal) Collected:  08/23/20 0316    Specimen:  Blood Updated:  08/23/20 0651     Neutrophil % 58.0 %      Lymphocyte % 24.0 %      Monocyte % 5.0 %      Eosinophil % 5.0 %      Bands %  8.0 %      Neutrophils Absolute 7.39 10*3/mm3      Lymphocytes Absolute 2.69 10*3/mm3      Monocytes Absolute 0.56 10*3/mm3      Eosinophils Absolute 0.56 10*3/mm3      RBC Morphology Normal     Toxic Granulation Slight/1+     Large Platelets Slight/1+    CBC Auto Differential [[id number]]  (Abnormal) Collected:  08/23/20 0316    Specimen:  Blood Updated:  08/23/20 0651     WBC 11.20 10*3/mm3       RBC 4.29 10*6/mm3      Hemoglobin 13.3 g/dL      Comment: Result checked         Hematocrit 39.6 %      MCV 92.1 fL      MCH 30.9 pg      MCHC 33.5 g/dL      RDW 13.9 %      RDW-SD 45.1 fl      MPV 8.8 fL      Platelets 225 10*3/mm3     Basic Metabolic Panel [[id number]]  (Abnormal) Collected:  08/23/20 0316    Specimen:  Blood Updated:  08/23/20 0454     Glucose 299 mg/dL      BUN --     Comment: Testing performed by alternate method        Creatinine 0.56 mg/dL      Sodium 134 mmol/L      Potassium 3.6 mmol/L      Comment: Specimen hemolyzed.  Results may be affected.        Chloride 100 mmol/L      Comment: Result checked         CO2 18.0 mmol/L      Calcium 7.9 mg/dL      eGFR Non African Amer 113 mL/min/1.73      BUN/Creatinine Ratio --     Comment: Testing not performed        Anion Gap 16.0 mmol/L     Narrative:       GFR Normal >60  Chronic Kidney Disease <60  Kidney Failure <15      POC Glucose Once [[id number]]  (Abnormal) Collected:  08/23/20 0005    Specimen:  Blood Updated:  08/23/20 0006     Glucose 275 mg/dL      Comment: Serial Number: [id number]:  [id number]       Hemoglobin A1c [[id number]] Collected:  08/22/20 1936    Specimen:  Blood Updated:  08/22/20 2322    POC Glucose Once [[id number]]  (Abnormal) Collected:  08/22/20 2214    Specimen:  Blood Updated:  08/22/20 2215     Glucose 339 mg/dL      Comment: Serial Number: [id number]:  [id number]       Blood Gas, Venous [[id number]]  (Abnormal) Collected:  08/22/20 2205    Specimen:  Venous Blood Updated:  08/22/20 2209     Site Right Brachial     pH, Venous 7.402 pH Units      pCO2, Venous 33.6 mm Hg      pO2, Venous 42.2 mm Hg      HCO3, Venous 20.9 mmol/L      Base Excess, Venous -3.0 mmol/L      Comment: Serial Number: [id number]:  [id number]        O2 Saturation, Venous 78.3 %      CO2 Content 21.9 mmol/L      Barometric Pressure for Blood Gas --     Comment: N/A        Modality Room Air    Comprehensive Metabolic Panel [[id number]]   (Abnormal) Collected:  08/22/20 2020    Specimen:  Blood from Arm, Right Updated:  08/22/20 2110     Glucose 415 mg/dL      BUN --     Comment: Testing performed by alternate method        Creatinine 0.65 mg/dL      Sodium 127 mmol/L      Potassium 3.8 mmol/L      Comment: Specimen hemolyzed.  Results may be affected.        Chloride 90 mmol/L      CO2 18.0 mmol/L      Calcium 9.2 mg/dL      Total Protein 6.5 g/dL      Albumin 4.10 g/dL      ALT (SGPT) 11 U/L      AST (SGOT) 11 U/L      Comment: Specimen hemolyzed.  Results may be affected.        Alkaline Phosphatase 110 U/L      Total Bilirubin 0.3 mg/dL      eGFR Non African Amer 95 mL/min/1.73      Globulin 2.4 gm/dL      A/G Ratio 1.7 g/dL      BUN/Creatinine Ratio --     Comment: Testing not performed        Anion Gap 19.0 mmol/L     Narrative:       GFR Normal >60  Chronic Kidney Disease <60  Kidney Failure <15      BUN [[id number]]  (Normal) Collected:  08/22/20 2020    Specimen:  Blood from Arm, Right Updated:  08/22/20 2104     BUN 13 mg/dL     Troponin [[id number]]  (Normal) Collected:  08/22/20 2020    Specimen:  Blood from Arm, Right Updated:  08/22/20 2055     Troponin T <0.010 ng/mL     Narrative:       Troponin T Reference Range:  <= 0.03 ng/mL-   Negative for AMI  >0.03 ng/mL-     Abnormal for myocardial necrosis.  Clinicians would have to utilize clinical acumen, EKG, Troponin and serial changes to determine if it is an Acute Myocardial Infarction or myocardial injury due to an underlying chronic condition.       Results may be falsely decreased if patient taking Biotin.      Urinalysis, Microscopic Only - Urine, Clean Catch [[id number]]  (Abnormal) Collected:  08/22/20 1935    Specimen:  Urine, Clean Catch Updated:  08/22/20 1948     RBC, UA 0-2 /HPF      WBC, UA 21-30 /HPF      Bacteria, UA Trace /HPF      Squamous Epithelial Cells, UA 0-2 /HPF      Hyaline Casts, UA 3-6 /LPF      Methodology Automated Microscopy    Urinalysis With Culture If  Indicated - Urine, Clean Catch [[id number]]  (Abnormal) Collected:  08/22/20 1935    Specimen:  Urine, Clean Catch Updated:  08/22/20 1948     Color, UA Yellow     Appearance, UA Clear     pH, UA <=5.0     Specific Gravity, UA 1.041     Glucose, UA >=1000 mg/dL (3+)     Ketones, UA 40 mg/dL (2+)     Bilirubin, UA Negative     Blood, UA Negative     Protein,  mg/dL (2+)     Leuk Esterase, UA Negative     Nitrite, UA Negative     Urobilinogen, UA 0.2 E.U./dL    CBC & Differential [[id number]] Collected:  08/22/20 1936    Specimen:  Blood Updated:  08/22/20 1943    Narrative:       The following orders were created for panel order CBC & Differential.  Procedure                               Abnormality         Status                     ---------                               -----------         ------                     CBC Auto Differential[[id number]]        Abnormal            Final result                 Please view results for these tests on the individual orders.    CBC Auto Differential [[id number]]  (Abnormal) Collected:  08/22/20 1936    Specimen:  Blood Updated:  08/22/20 1943     WBC 11.50 10*3/mm3      RBC 5.08 10*6/mm3      Hemoglobin 16.0 g/dL      Hematocrit 46.7 %      MCV 91.9 fL      MCH 31.4 pg      MCHC 34.2 g/dL      RDW 13.7 %      RDW-SD 44.2 fl      MPV 8.9 fL      Platelets 311 10*3/mm3      Neutrophil % 60.4 %      Lymphocyte % 30.3 %      Monocyte % 6.0 %      Eosinophil % 2.5 %      Basophil % 0.8 %      Neutrophils, Absolute 6.90 10*3/mm3      Lymphocytes, Absolute 3.50 10*3/mm3      Monocytes, Absolute 0.70 10*3/mm3      Eosinophils, Absolute 0.30 10*3/mm3      Basophils, Absolute 0.10 10*3/mm3      nRBC 0.1 /100 WBC     POC Glucose Once [[id number]]  (Abnormal) Collected:  08/22/20 1916    Specimen:  Blood Updated:  08/22/20 1917     Glucose 437 mg/dL      Comment: Serial Number: [id number]:  [id number]             Assessment/Plan     1. Diabetes, likely type 1, with  hyperglycemia  2. UTI, on antibiotic Rx    Will recheck urine for ketones & get c-peptide & MYRA AB to check insulin secretion reserve.  Added scheduled pre meal Humalog.  Diabetes educator to see pt tomorrow.  Se is receptive to basal / bolus insulin regimen post discharge.  Will schedule out pt education as well.  Will follow with you.  Thank you for the consult.      I discussed the patients findings and my recommendations with patient    Nelda Mena MD  08/23/20  17:01

## 2020-08-23 NOTE — PLAN OF CARE
Problem: Patient Care Overview  Goal: Plan of Care Review  Outcome: Ongoing (interventions implemented as appropriate)  Flowsheets (Taken 8/23/2020 0302)  Progress: no change  Plan of Care Reviewed With: patient  Outcome Summary: Patient came up from ER with new onset diabetes. Diabetes educator consult in. Hemoglobin A1C ordered for this morning. Will check patient's blood sugar ACHS. Patient also appears to have a UTI. Started on IV fluids and IV atb. Will continue to monitor.

## 2020-08-23 NOTE — ED PROVIDER NOTES
Subjective   Chief complaint lightheaded dizzy    History of present illness 54-year-old female whose had a few day history of feeling lightheaded dizzy and weak states her blood pressures been elevated she had a family member check her blood sugar and was reading over 500 and she presents for evaluation.  No chest pain neck arm or jaw pain no fever chills denies any complaints of recent illness flus or viruses no COVID-19 symptoms.  Nothing makes this better or worse symptoms moderate degree ongoing for a few days.  Patient states she is had some irregular uterine bleeding and she took a long course of oral contraceptive for about 10 days recently and states this when the symptoms had started          Review of Systems   Constitutional: Negative for chills and fever.   HENT: Negative for congestion and sinus pressure.    Eyes: Negative for photophobia and visual disturbance.   Respiratory: Negative for chest tightness and shortness of breath.    Cardiovascular: Negative for chest pain and leg swelling.   Gastrointestinal: Negative for abdominal pain and vomiting.   Endocrine: Positive for polydipsia and polyuria.   Genitourinary: Negative for difficulty urinating and dysuria.   Musculoskeletal: Negative for arthralgias and back pain.   Skin: Negative for color change and pallor.   Neurological: Positive for dizziness and light-headedness. Negative for speech difficulty and headaches.   Psychiatric/Behavioral: Negative for agitation and behavioral problems.       Past Medical History:   Diagnosis Date   • Allergic    • Diabetes mellitus (CMS/HCC)     New onset   • Renal calculi     from calcium   • Spinal stenosis        Allergies   Allergen Reactions   • Blue Dyes (Parenteral)        History reviewed. No pertinent surgical history.    Family History   Problem Relation Age of Onset   • Heart disease Mother    • Diabetes Mother    • Arthritis Mother    • Hypertension Father    • Diabetes Father    • Stroke Father     • No Known Problems Brother        Social History     Socioeconomic History   • Marital status:      Spouse name: Not on file   • Number of children: Not on file   • Years of education: Not on file   • Highest education level: Not on file   Tobacco Use   • Smoking status: Former Smoker     Packs/day: 0.50     Years: 35.00     Pack years: 17.50   • Smokeless tobacco: Never Used   • Tobacco comment: On and Off   Substance and Sexual Activity   • Alcohol use: Yes     Comment: once-twice/yr.   • Drug use: No   • Sexual activity: Yes     Partners: Male     Birth control/protection: None       Prior to Admission medications    Medication Sig Start Date End Date Taking? Authorizing Provider   Magnesium Oxide (Mag-200) 200 MG tablet Take 200 mg by mouth Daily.   Yes Provider, MD Nissa   NON FORMULARY Sovereign silver   Yes Provider, MD Nissa         Objective   Physical Exam  54-year-old awake alert no acute distress HEENT extraocular muscle tach pupils equal and reactive no nystagmus mouth clear neck supple no adenopathy no meningeal signs no JVD no bruits lungs clear no retractions heart regular without murmur M was soft no tenderness no pulsatile masses extremities pulses are equal, extremities no edema cords or Homans sign or evidence of DVT.  Patient's awake alert orientated x4 no facial asymmetry normal speech no drift the arms legs no ataxia she walks without problems no focal findings  Procedures           ED Course      Results for orders placed or performed during the hospital encounter of 08/22/20   Urine Culture - Urine, Urine, Clean Catch   Result Value Ref Range    Urine Culture Growth present, too young to evaluate    Comprehensive Metabolic Panel   Result Value Ref Range    Glucose 415 (C) 65 - 99 mg/dL    BUN      Creatinine 0.65 0.57 - 1.00 mg/dL    Sodium 127 (L) 136 - 145 mmol/L    Potassium 3.8 3.5 - 5.2 mmol/L    Chloride 90 (L) 98 - 107 mmol/L    CO2 18.0 (L) 22.0 - 29.0 mmol/L     Calcium 9.2 8.6 - 10.5 mg/dL    Total Protein 6.5 6.0 - 8.5 g/dL    Albumin 4.10 3.50 - 5.20 g/dL    ALT (SGPT) 11 1 - 33 U/L    AST (SGOT) 11 1 - 32 U/L    Alkaline Phosphatase 110 39 - 117 U/L    Total Bilirubin 0.3 0.0 - 1.2 mg/dL    eGFR Non African Amer 95 >60 mL/min/1.73    Globulin 2.4 gm/dL    A/G Ratio 1.7 g/dL    BUN/Creatinine Ratio      Anion Gap 19.0 (H) 5.0 - 15.0 mmol/L   Troponin   Result Value Ref Range    Troponin T <0.010 0.000 - 0.030 ng/mL   Urinalysis With Culture If Indicated - Urine, Clean Catch   Result Value Ref Range    Color, UA Yellow Yellow, Straw    Appearance, UA Clear Clear    pH, UA <=5.0 5.0 - 8.0    Specific Gravity, UA 1.041 (H) 1.005 - 1.030    Glucose, UA >=1000 mg/dL (3+) (A) Negative    Ketones, UA 40 mg/dL (2+) (A) Negative    Bilirubin, UA Negative Negative    Blood, UA Negative Negative    Protein,  mg/dL (2+) (A) Negative    Leuk Esterase, UA Negative Negative    Nitrite, UA Negative Negative    Urobilinogen, UA 0.2 E.U./dL 0.2 - 1.0 E.U./dL   CBC Auto Differential   Result Value Ref Range    WBC 11.50 (H) 3.40 - 10.80 10*3/mm3    RBC 5.08 3.77 - 5.28 10*6/mm3    Hemoglobin 16.0 (H) 12.0 - 15.9 g/dL    Hematocrit 46.7 (H) 34.0 - 46.6 %    MCV 91.9 79.0 - 97.0 fL    MCH 31.4 26.6 - 33.0 pg    MCHC 34.2 31.5 - 35.7 g/dL    RDW 13.7 12.3 - 15.4 %    RDW-SD 44.2 37.0 - 54.0 fl    MPV 8.9 6.0 - 12.0 fL    Platelets 311 140 - 450 10*3/mm3    Neutrophil % 60.4 42.7 - 76.0 %    Lymphocyte % 30.3 19.6 - 45.3 %    Monocyte % 6.0 5.0 - 12.0 %    Eosinophil % 2.5 0.3 - 6.2 %    Basophil % 0.8 0.0 - 1.5 %    Neutrophils, Absolute 6.90 1.70 - 7.00 10*3/mm3    Lymphocytes, Absolute 3.50 (H) 0.70 - 3.10 10*3/mm3    Monocytes, Absolute 0.70 0.10 - 0.90 10*3/mm3    Eosinophils, Absolute 0.30 0.00 - 0.40 10*3/mm3    Basophils, Absolute 0.10 0.00 - 0.20 10*3/mm3    nRBC 0.1 0.0 - 0.2 /100 WBC   Urinalysis, Microscopic Only - Urine, Clean Catch   Result Value Ref Range    RBC, UA 0-2  (A) None Seen /HPF    WBC, UA 21-30 (A) None Seen /HPF    Bacteria, UA Trace (A) None Seen /HPF    Squamous Epithelial Cells, UA 0-2 None Seen, 0-2 /HPF    Hyaline Casts, UA 3-6 None Seen /LPF    Methodology Automated Microscopy    BUN   Result Value Ref Range    BUN 13 6 - 20 mg/dL   Blood Gas, Venous   Result Value Ref Range    Site Right Brachial     pH, Venous 7.402 7.320 - 7.430 pH Units    pCO2, Venous 33.6 (L) 42.0 - 51.0 mm Hg    pO2, Venous 42.2 >=40.0 mm Hg    HCO3, Venous 20.9 mmol/L    Base Excess, Venous -3.0 mmol/L    O2 Saturation, Venous 78.3 %    CO2 Content 21.9 (L) 22 - 29 mmol/L    Barometric Pressure for Blood Gas      Modality Room Air    Basic Metabolic Panel   Result Value Ref Range    Glucose 299 (H) 65 - 99 mg/dL    BUN      Creatinine 0.56 (L) 0.57 - 1.00 mg/dL    Sodium 134 (L) 136 - 145 mmol/L    Potassium 3.6 3.5 - 5.2 mmol/L    Chloride 100 98 - 107 mmol/L    CO2 18.0 (L) 22.0 - 29.0 mmol/L    Calcium 7.9 (L) 8.6 - 10.5 mg/dL    eGFR Non African Amer 113 >60 mL/min/1.73    BUN/Creatinine Ratio      Anion Gap 16.0 (H) 5.0 - 15.0 mmol/L   CBC Auto Differential   Result Value Ref Range    WBC 11.20 (H) 3.40 - 10.80 10*3/mm3    RBC 4.29 3.77 - 5.28 10*6/mm3    Hemoglobin 13.3 12.0 - 15.9 g/dL    Hematocrit 39.6 34.0 - 46.6 %    MCV 92.1 79.0 - 97.0 fL    MCH 30.9 26.6 - 33.0 pg    MCHC 33.5 31.5 - 35.7 g/dL    RDW 13.9 12.3 - 15.4 %    RDW-SD 45.1 37.0 - 54.0 fl    MPV 8.8 6.0 - 12.0 fL    Platelets 225 140 - 450 10*3/mm3   Scan Slide   Result Value Ref Range    Scan Slide     BUN   Result Value Ref Range    BUN 11 6 - 20 mg/dL   Manual Differential   Result Value Ref Range    Neutrophil % 58.0 42.7 - 76.0 %    Lymphocyte % 24.0 19.6 - 45.3 %    Monocyte % 5.0 5.0 - 12.0 %    Eosinophil % 5.0 0.3 - 6.2 %    Bands %  8.0 (H) 0.0 - 5.0 %    Neutrophils Absolute 7.39 (H) 1.70 - 7.00 10*3/mm3    Lymphocytes Absolute 2.69 0.70 - 3.10 10*3/mm3    Monocytes Absolute 0.56 0.10 - 0.90 10*3/mm3     Eosinophils Absolute 0.56 (H) 0.00 - 0.40 10*3/mm3    RBC Morphology Normal Normal    Toxic Granulation Slight/1+ None Seen    Large Platelets Slight/1+ None Seen   Acetone, Urine, Qualitative - Urine, Clean Catch   Result Value Ref Range    Ketones, UA 80 mg/dL (3+) (A) Negative   POC Glucose Once   Result Value Ref Range    Glucose 437 (C) 70 - 105 mg/dL   POC Glucose Once   Result Value Ref Range    Glucose 339 (H) 70 - 105 mg/dL   POC Glucose Once   Result Value Ref Range    Glucose 275 (H) 70 - 105 mg/dL   POC Glucose Once   Result Value Ref Range    Glucose 281 (H) 70 - 105 mg/dL   POC Glucose Once   Result Value Ref Range    Glucose 385 (H) 70 - 105 mg/dL   POC Glucose Once   Result Value Ref Range    Glucose 281 (H) 70 - 105 mg/dL   POC Glucose Once   Result Value Ref Range    Glucose 295 (H) 70 - 105 mg/dL   POC Glucose Once   Result Value Ref Range    Glucose 334 (H) 70 - 105 mg/dL     No radiology results for the last day  Medications   sodium chloride 0.9 % flush 10 mL (has no administration in time range)   dextrose (GLUTOSE) oral gel 15 g (has no administration in time range)   dextrose (D50W) 25 g/ 50mL Intravenous Solution 25 g (has no administration in time range)   glucagon (human recombinant) (GLUCAGEN DIAGNOSTIC) injection 1 mg (has no administration in time range)   sodium chloride 0.9 % flush 10 mL (10 mL Intravenous Given 8/23/20 2023)   sodium chloride 0.9 % flush 10 mL (has no administration in time range)   acetaminophen (TYLENOL) tablet 650 mg (has no administration in time range)     Or   acetaminophen (TYLENOL) 160 MG/5ML solution 650 mg (has no administration in time range)     Or   acetaminophen (TYLENOL) suppository 650 mg (has no administration in time range)   melatonin tablet 5 mg (has no administration in time range)   bisacodyl (DULCOLAX) EC tablet 5 mg (5 mg Oral Given 8/23/20 2023)   magnesium hydroxide (MILK OF MAGNESIA) suspension 2400 mg/10mL 10 mL (has no administration in  time range)   ondansetron (ZOFRAN) tablet 4 mg (has no administration in time range)     Or   ondansetron (ZOFRAN) injection 4 mg (has no administration in time range)   aluminum-magnesium hydroxide-simethicone (MAALOX MAX) 400-400-40 MG/5ML suspension 15 mL (has no administration in time range)   insulin lispro (humaLOG) injection 0-9 Units (6 Units Subcutaneous Given 8/23/20 1725)     And   insulin lispro (humaLOG) injection 0-9 Units (has no administration in time range)   sodium chloride 0.9 % infusion (75 mL/hr Intravenous New Bag 8/23/20 1641)   cefTRIAXone (ROCEPHIN) 1 g in sodium chloride 0.9 % 100 mL IVPB (0 g Intravenous Stopped 8/23/20 2054)   insulin glargine (LANTUS) injection 15 Units (15 Units Subcutaneous Given 8/23/20 1443)   insulin lispro (humaLOG) injection 5 Units (5 Units Subcutaneous Given 8/23/20 1724)   sodium chloride 0.9 % bolus 1,000 mL (0 mL Intravenous Stopped 8/22/20 2218)   sodium chloride 0.9 % bolus 1,000 mL (1,000 mL Intravenous New Bag 8/22/20 2218)   insulin regular (humuLIN R,novoLIN R) injection 8 Units (8 Units Intravenous Given 8/22/20 2217)   cefTRIAXone (ROCEPHIN) in SWFI 1 gram/10ml IV PUSH syringe (1 g Intravenous Given 8/22/20 2230)          EKG my interpretation normal sinus rhythm rate of 83 normal axis no hypertrophy QTC of 4 and 36 normal EKG                                  MDM  Number of Diagnoses or Management Options  Diabetes mellitus, new onset (CMS/HCC):   Urinary tract infection without hematuria, site unspecified:   Diagnosis management comments: Medical decision making.  Patient IV established given 2 L of saline and had the above exam and evaluation.  Patient's chemistries blood sugar 415 CO2 of 18 the patient had a blood gas pH of 7.4 on a venous gas urine showed some ketones and about 30 white cells that was cultured probably was negative EKG unremarkable.  She was given Rocephin 1 g IV for the urine.  Patient was given regular insulin 8 units IV.   Patient sugar was started to come down she was resting company no distress.  This is a new onset diabetes she correlates this with recent hormones that she had taken.  She is resting comfortably she looks well otherwise.  Her pH is normal she is a little dehydrated with a CO2 of 18 and some ketones in her urine but do not see any evidence of suggest acute diabetic ketoacidosis.  Hospitalist notified and patient be placed in the hospital for further care stable unremarkable improved ER course      Final diagnoses:   Diabetes mellitus, new onset (CMS/[facility])   Urinary tract infection without hematuria, site unspecified            Selvin Montelongo MD  08/23/20 7249

## 2020-08-23 NOTE — PLAN OF CARE
Problem: Patient Care Overview  Goal: Plan of Care Review  Outcome: Ongoing (interventions implemented as appropriate)  Flowsheets (Taken 8/23/2020 0302 by Cookie Bowen RN)  Plan of Care Reviewed With: patient  Note:   Discussed plan of care with pt. Pt stated diabetes does run in her family on her mother's side. She has experienced increased thirst and urination lately. She reported loss of appetite. Educated pt about insulin, diet, exercise, and other lifestyle modifications. Verbalized understanding. Waiting on endocrinologist and diabetic educator. Informed pt she would be obtaining a glucometer, strips, and lancets with the diabetic educator and how to use the machine. I educated her that we will wait on physician to decide if she needs insulin vs. oral medications.   Goal: Individualization and Mutuality  Outcome: Ongoing (interventions implemented as appropriate)  Goal: Discharge Needs Assessment  Outcome: Ongoing (interventions implemented as appropriate)  Goal: Interprofessional Rounds/Family Conf  Outcome: Ongoing (interventions implemented as appropriate)     Problem: Pain, Chronic (Adult)  Goal: Identify Related Risk Factors and Signs and Symptoms  Outcome: Ongoing (interventions implemented as appropriate)  Flowsheets (Taken 8/23/2020 1111)  Related Risk Factors (Chronic Pain): disease process  Goal: Acceptable Pain/Comfort Level and Functional Ability  Outcome: Ongoing (interventions implemented as appropriate)     Problem: Diabetes, Type 2 (Adult)  Goal: Signs and Symptoms of Listed Potential Problems Will be Absent, Minimized or Managed (Diabetes, Type 2)  Outcome: Ongoing (interventions implemented as appropriate)  Flowsheets (Taken 8/23/2020 1111)  Problems Assessed (Type 2 Diabetes): all  Problems Present (Type 2 Diabetes): hyperglycemia

## 2020-08-24 ENCOUNTER — EPISODE CHANGES (OUTPATIENT)
Dept: CASE MANAGEMENT | Facility: OTHER | Age: 55
End: 2020-08-24

## 2020-08-24 VITALS
HEIGHT: 68 IN | OXYGEN SATURATION: 96 % | BODY MASS INDEX: 27.28 KG/M2 | SYSTOLIC BLOOD PRESSURE: 112 MMHG | HEART RATE: 76 BPM | WEIGHT: 180 LBS | DIASTOLIC BLOOD PRESSURE: 80 MMHG | RESPIRATION RATE: 16 BRPM | TEMPERATURE: 98.1 F

## 2020-08-24 LAB
BACTERIA SPEC AEROBE CULT: NORMAL
GLUCOSE BLDC GLUCOMTR-MCNC: 245 MG/DL (ref 70–105)
GLUCOSE BLDC GLUCOMTR-MCNC: 248 MG/DL (ref 70–105)
GLUCOSE BLDC GLUCOMTR-MCNC: 289 MG/DL (ref 70–105)
HBA1C MFR BLD: 14.1 % (ref 3.5–5.6)
KETONES UR QL STRIP: ABNORMAL

## 2020-08-24 PROCEDURE — 99239 HOSP IP/OBS DSCHRG MGMT >30: CPT | Performed by: INTERNAL MEDICINE

## 2020-08-24 PROCEDURE — 81003 URINALYSIS AUTO W/O SCOPE: CPT | Performed by: INTERNAL MEDICINE

## 2020-08-24 PROCEDURE — 82962 GLUCOSE BLOOD TEST: CPT

## 2020-08-24 PROCEDURE — 84681 ASSAY OF C-PEPTIDE: CPT | Performed by: INTERNAL MEDICINE

## 2020-08-24 PROCEDURE — 63710000001 INSULIN LISPRO (HUMAN) PER 5 UNITS: Performed by: INTERNAL MEDICINE

## 2020-08-24 PROCEDURE — 63710000001 INSULIN LISPRO (HUMAN) PER 5 UNITS: Performed by: NURSE PRACTITIONER

## 2020-08-24 PROCEDURE — 86341 ISLET CELL ANTIBODY: CPT | Performed by: INTERNAL MEDICINE

## 2020-08-24 PROCEDURE — G0108 DIAB MANAGE TRN  PER INDIV: HCPCS

## 2020-08-24 PROCEDURE — 25010000002 ONDANSETRON PER 1 MG: Performed by: NURSE PRACTITIONER

## 2020-08-24 PROCEDURE — 63710000001 INSULIN GLARGINE PER 5 UNITS: Performed by: INTERNAL MEDICINE

## 2020-08-24 RX ORDER — SYRINGE-NEEDLE,INSULIN,0.5 ML 27GX1/2"
1 SYRINGE, EMPTY DISPOSABLE MISCELLANEOUS
Qty: 200 EACH | Refills: 0 | Status: SHIPPED | OUTPATIENT
Start: 2020-08-24 | End: 2021-03-18 | Stop reason: SDUPTHER

## 2020-08-24 RX ORDER — BISACODYL 10 MG
10 SUPPOSITORY, RECTAL RECTAL DAILY
Status: DISCONTINUED | OUTPATIENT
Start: 2020-08-24 | End: 2020-08-24 | Stop reason: HOSPADM

## 2020-08-24 RX ORDER — INSULIN GLARGINE 100 [IU]/ML
15 INJECTION, SOLUTION SUBCUTANEOUS EVERY MORNING
Qty: 100 ML | Refills: 12 | Status: SHIPPED | OUTPATIENT
Start: 2020-08-25 | End: 2020-08-24

## 2020-08-24 RX ORDER — ISOPROPYL ALCOHOL 700 MG/ML
1 CLOTH TOPICAL
Qty: 200 EACH | Refills: 0 | Status: SHIPPED | OUTPATIENT
Start: 2020-08-24 | End: 2021-03-18

## 2020-08-24 RX ORDER — INSULIN GLARGINE 100 [IU]/ML
20 INJECTION, SOLUTION SUBCUTANEOUS EVERY MORNING
Qty: 100 ML | Refills: 12 | Status: SHIPPED | OUTPATIENT
Start: 2020-08-25 | End: 2020-08-24

## 2020-08-24 RX ORDER — INSULIN GLARGINE 100 [IU]/ML
20 INJECTION, SOLUTION SUBCUTANEOUS EVERY MORNING
Qty: 100 ML | Refills: 12 | Status: SHIPPED | OUTPATIENT
Start: 2020-08-25 | End: 2020-09-14

## 2020-08-24 RX ORDER — LANCETS 30 GAUGE
1 EACH MISCELLANEOUS
Qty: 100 EACH | Refills: 1 | Status: SHIPPED | OUTPATIENT
Start: 2020-08-24 | End: 2021-10-15

## 2020-08-24 RX ADMIN — INSULIN GLARGINE 15 UNITS: 100 INJECTION, SOLUTION SUBCUTANEOUS at 06:21

## 2020-08-24 RX ADMIN — INSULIN LISPRO 8 UNITS: 100 INJECTION, SOLUTION INTRAVENOUS; SUBCUTANEOUS at 08:28

## 2020-08-24 RX ADMIN — INSULIN LISPRO 4 UNITS: 100 INJECTION, SOLUTION INTRAVENOUS; SUBCUTANEOUS at 12:12

## 2020-08-24 RX ADMIN — INSULIN LISPRO 4 UNITS: 100 INJECTION, SOLUTION INTRAVENOUS; SUBCUTANEOUS at 08:28

## 2020-08-24 RX ADMIN — INSULIN LISPRO 6 UNITS: 100 INJECTION, SOLUTION INTRAVENOUS; SUBCUTANEOUS at 11:37

## 2020-08-24 RX ADMIN — ONDANSETRON 4 MG: 2 INJECTION INTRAMUSCULAR; INTRAVENOUS at 04:36

## 2020-08-24 RX ADMIN — BISACODYL 10 MG: 10 SUPPOSITORY RECTAL at 15:11

## 2020-08-24 RX ADMIN — Medication 10 ML: at 08:49

## 2020-08-24 RX ADMIN — BISACODYL 5 MG: 5 TABLET, COATED ORAL at 08:35

## 2020-08-24 RX ADMIN — MAGNESIUM HYDROXIDE 10 ML: 2400 SUSPENSION ORAL at 08:35

## 2020-08-24 RX ADMIN — INSULIN LISPRO 8 UNITS: 100 INJECTION, SOLUTION INTRAVENOUS; SUBCUTANEOUS at 11:37

## 2020-08-24 NOTE — PLAN OF CARE
Problem: Patient Care Overview  Goal: Plan of Care Review  Outcome: Ongoing (interventions implemented as appropriate)  Flowsheets  Taken 8/24/2020 0248  Progress: improving  Taken 8/23/2020 1950  Plan of Care Reviewed With: patient   Patient alert and oriented, BS continues to be high, IVF's normal saline @ 100, patient has rested well through the night, will continue to monitor.

## 2020-08-24 NOTE — PAYOR COMM NOTE
"AUTHORIZATION PENDING:   PLEASE CALL OR FAX DETERMINATION TO CONTACT BELOW. THANK YOU.        Juanita Durant, RN MSN  /UR  [facility]  [phone] office  [phone] fax  [email or web address]    [facility]  NPI: [id number]  Tax: 118-      Yadira Durant (54 y.o. Female)     Date of Birth Social Security Number Address Home Phone MRN    1965  [id number] [place] [address] [phone] [id number]    [employer] Marital Status          None        Admission Date Admission Type Admitting Provider Attending Provider Department, Room/Bed    8/22/20 Emergency Jess Leija MD  [facility] 3C MEDICAL INPATIENT, 382/1    Discharge Date Discharge Disposition Discharge Destination        8/24/2020 Home or Self Care              Attending Provider:  (none)   Allergies:  Blue Dyes (Parenteral)    Isolation:  None   Infection:  None   Code Status:  CPR    Ht:  172.7 cm (68\")   Wt:  81.6 kg (180 lb)    Admission Cmt:  None   Principal Problem:  None                Active Insurance as of 8/22/2020     Primary Coverage     Payor Plan Insurance Group Employer/Plan Group    [employer] [id number]     Payor Plan Address Payor Plan Phone Number Payor Plan Fax Number Effective Dates    [address] [phone]  1/1/2020 - None Entered    [facility] [id number]       Subscriber Name Subscriber Birth Date Member ID       YADIRA DURANT 1965 [id number]                 Emergency Contacts      (Rel.) Home Phone Work Phone Mobile Phone    JosepClarice (Mother) [phone] -- --    JosepKeshav (Brother) [phone] -- --        08/24/20 1105  Discharge patient Once    Completed   Expected Discharge Date: 08/24/20    Expected Discharge Time: Midday    Discharge Disposition: Home or Self Care    Physician of Record for Attribution - Please select from Treatment Team: JESS LEIJA [[id number]] "    Review needed by CMO to determine Physician of Record: No       References: Link to Physician of Record Policy    20 1104   204  Inpatient Admission Once    Completed   Level of Care: Telemetry    Diagnosis: Diabetes mellitus, new onset (CMS/HCC) [719026]    Admitting Physician: TEODORA DE GUZMAN [[id number]]    Attending Physician: TEODORA DE GUZMAN [[id number]]    Certification: I certify that inpatient hospital services are medically necessary for greater than 2 midnights.            DX:  Diabetes mellitus, new onset (CMS/HCC) ICD-10-CM: E11.9              History & Physical      Malin Fletcher REGULO, APRN at 20 3217                [facility] Medicine Services      Patient Name: Yadira Car  : 1965  MRN: [id number]  Primary Care Physician: Provider, No Known  Date of admission: 2020    Patient Care Team:  Provider, No Known as PCP - General          Subjective   History Present Illness     Chief Complaint:   Chief Complaint   Patient presents with   • Hyperglycemia         Ms. Car is a 54 y.o. female with a history of abnormal uterine bleeding who was recently placed on progesterone p.o. presented to the [facility] ED on 2020 with a complaint of hyperglycemia, polydipsia and polyuria.  Patient states that she was on progesterone x21 days per her OB/GYN, for abnormal uterine bleeding.  Patient states she has had increased thirst and increased urination since she has been on progesterone.  Patient states she took her blood sugar at a friend's house yesterday and the glucometer read high.  Patient does have a family history of diabetes on her mom's side.  Patient denies fever, chills, nausea, vomiting, diarrhea, or ill contacts.    In the ED, glucose 437, troponin<0.01, sodium 127, potassium 3.8, CO2 18.0, anion gap 19.0, BUN 13, creatinine 0.65, WBC 11.5, Hgb 16.0, HCT 46.7, platelets 311.  UA specific gravity 1.041, glucose 3+, ketones 2+, protein 2+, 21-30 WBC,  trace bacteria, urine culture pending.  EKG: Normal sinus rhythm.  pH 7.402.  Patient received 2 L normal saline IV bolus, 8 units regular insulin, 1 g Rocephin IV.      Review of Systems   Constitution: Negative.   HENT: Negative.    Eyes: Negative.    Cardiovascular: Negative.    Respiratory: Negative.    Endocrine: Negative.    Hematologic/Lymphatic: Negative.    Skin: Negative.    Musculoskeletal: Negative.    Gastrointestinal:        Polydipsia   Genitourinary: Positive for frequency.   Neurological: Negative.    Psychiatric/Behavioral: Negative.    Allergic/Immunologic: Negative.    All other systems reviewed and are negative.          Personal History     Past Medical History:   Past Medical History:   Diagnosis Date   • Allergic    • Renal calculi     from calcium   • Spinal stenosis        Surgical History:    History reviewed. No pertinent surgical history.        Family History: family history includes Arthritis in her mother; Diabetes in her father and mother; Heart disease in her mother; Hypertension in her father; No Known Problems in her brother; Stroke in her father. Otherwise pertinent FHx was reviewed and unremarkable.     Social History:  reports that she has been smoking. She does not have any smokeless tobacco history on file. She reports that she drinks alcohol. She reports that she does not use drugs.      Medications:  Prior to Admission medications    Medication Sig Start Date End Date Taking? Authorizing Provider   Magnesium Oxide (Mag-200) 200 MG tablet Take 200 mg by mouth Daily.   Yes ProviderNissa MD   NON FORMULARY Sovereign silver   Yes Provider, MD Nissa   cyclobenzaprine (FLEXERIL) 10 MG tablet Take 1 tablet (10 mg total) by mouth 3 (three) times a day as needed for muscle spasms. 1 to 3 times a day when necessary-muscle spasms 1/29/16 8/22/20  De Benedict MD   ibuprofen (ADVIL,MOTRIN) 400 MG tablet Ashley Sánchez LPN 10/11/16 8/22/20  De Benedict MD      VOLTAREN 1 % gel gel APPLY TOPICALLY TWO TIMES A DAY 2/20/17 8/22/20  De Benedict MD       Allergies:    Allergies   Allergen Reactions   • Blue Dyes (Parenteral)        Objective   Objective     Vital Signs  Temp:  [98.1 °F (36.7 °C)] 98.1 °F (36.7 °C)  Heart Rate:  [82-99] 82  Resp:  [15-18] 15  BP: (122-153)/(77-90) 122/77  SpO2:  [97 %-99 %] 99 %  on   ;      Body mass index is 27.99 kg/m².    Physical Exam   Constitutional: She is oriented to person, place, and time. She appears well-developed and well-nourished.   HENT:   Head: Normocephalic.   Eyes: Conjunctivae are normal.   Neck: Normal range of motion.   Cardiovascular: Normal rate, regular rhythm, normal heart sounds and intact distal pulses.   Pulmonary/Chest: Effort normal and breath sounds normal.   Abdominal: Soft. Bowel sounds are normal.   Musculoskeletal: Normal range of motion. She exhibits no edema.   Neurological: She is alert and oriented to person, place, and time.   Skin: Skin is warm. Capillary refill takes less than 2 seconds.   Vitals reviewed.      Results Review:  I have personally reviewed most recent cardiac tracings, lab results and radiology images and interpretations and agree with findings    Results from last 7 days   Lab Units 08/22/20 1936   WBC 10*3/mm3 11.50*   HEMOGLOBIN g/dL 16.0*   HEMATOCRIT % 46.7*   PLATELETS 10*3/mm3 311     Results from last 7 days   Lab Units 08/22/20 2020   SODIUM mmol/L 127*   POTASSIUM mmol/L 3.8   CHLORIDE mmol/L 90*   CO2 mmol/L 18.0*   BUN mg/dL 13   CREATININE mg/dL 0.65   GLUCOSE mg/dL 415*   CALCIUM mg/dL 9.2   ALT (SGPT) U/L 11   AST (SGOT) U/L 11   TROPONIN T ng/mL <0.010     Estimated Creatinine Clearance: 113.3 mL/min (by C-G formula based on SCr of 0.65 mg/dL).  Brief Urine Lab Results  (Last result in the past 365 days)      Color   Clarity   Blood   Leuk Est   Nitrite   Protein   CREAT   Urine HCG        08/22/20 1935 Yellow Clear Negative Negative Negative 100 mg/dL (2+)                Microbiology Results (last 10 days)     ** No results found for the last 240 hours. **          ECG/EMG Results (most recent)     Procedure Component Value Units Date/Time    ECG 12 Lead [[id number]] Collected:  08/22/20 1954     Updated:  08/22/20 1956    Narrative:       HEART RATE= 83  bpm  RR Interval= 724  ms  MD Interval= 142  ms  P Horizontal Axis= 6  deg  P Front Axis= 53  deg  QRSD Interval= 65  ms  QT Interval= 371  ms  QRS Axis= 52  deg  T Wave Axis= 23  deg  - OTHERWISE NORMAL ECG -  Sinus rhythm  Low voltage, precordial leads  Electronically Signed By:   Date and Time of Study: 2020-08-22 19:54:25                    No radiology results for the last 7 days      Estimated Creatinine Clearance: 113.3 mL/min (by C-G formula based on SCr of 0.65 mg/dL).    Assessment/Plan   Assessment/Plan       Active Hospital Problems    Diagnosis  POA   • Diabetes mellitus, new onset (CMS/HCC) [E11.9]  Yes      Resolved Hospital Problems   No resolved problems to display.     Hyperglycemia, new onset diabetes  - Glucose 437 upon admission  - Polydipsia, frequency of urination  -Patient received 2 L normal saline bolus in ED  -NS at 75 mL/h  -Recheck BMP in a.m.  - We will check A1c  -Accu-Cheks before meals and at bedtime  -Sliding scale insulin  - Diabetes educator to see  -May benefit from endocrine consult    Urinary tract infection  -Continue Rocephin  - Follow urine culture        VTE Prophylaxis -   Mechanical Order History:      Ordered        Signed and Held  Place Sequential Compression Device  Once         Signed and Held  Maintain Sequential Compression Device  Continuous                 Pharmalogical Order History:     None          CODE STATUS:    Code Status and Medical Interventions:   Ordered at: 08/22/20 6499     Level Of Support Discussed With:    Patient     Code Status:    CPR     Medical Interventions (Level of Support Prior to Arrest):    Full           I discussed the patient's findings and  my recommendations with patient.        Electronically signed by TIFFANY Roca, 08/22/20, 10:55 PM.  [facility] Hospitalist Team          Electronically signed by Ismael Kennedy MD at 08/23/20 1044          Emergency Department Notes      Selvin Montelongo MD at 08/22/20 7312          Subjective   Chief complaint lightheaded dizzy    History of present illness 54-year-old female whose had a few day history of feeling lightheaded dizzy and weak states her blood pressures been elevated she had a family member check her blood sugar and was reading over 500 and she presents for evaluation.  No chest pain neck arm or jaw pain no fever chills denies any complaints of recent illness flus or viruses no COVID-19 symptoms.  Nothing makes this better or worse symptoms moderate degree ongoing for a few days.  Patient states she is had some irregular uterine bleeding and she took a long course of oral contraceptive for about 10 days recently and states this when the symptoms had started          Review of Systems   Constitutional: Negative for chills and fever.   HENT: Negative for congestion and sinus pressure.    Eyes: Negative for photophobia and visual disturbance.   Respiratory: Negative for chest tightness and shortness of breath.    Cardiovascular: Negative for chest pain and leg swelling.   Gastrointestinal: Negative for abdominal pain and vomiting.   Endocrine: Positive for polydipsia and polyuria.   Genitourinary: Negative for difficulty urinating and dysuria.   Musculoskeletal: Negative for arthralgias and back pain.   Skin: Negative for color change and pallor.   Neurological: Positive for dizziness and light-headedness. Negative for speech difficulty and headaches.   Psychiatric/Behavioral: Negative for agitation and behavioral problems.       Past Medical History:   Diagnosis Date   • Allergic    • Diabetes mellitus (CMS/HCC)     New onset   • Renal calculi     from calcium   • Spinal stenosis         Allergies   Allergen Reactions   • Blue Dyes (Parenteral)        History reviewed. No pertinent surgical history.    Family History   Problem Relation Age of Onset   • Heart disease Mother    • Diabetes Mother    • Arthritis Mother    • Hypertension Father    • Diabetes Father    • Stroke Father    • No Known Problems Brother        Social History     Socioeconomic History   • Marital status:      Spouse name: Not on file   • Number of children: Not on file   • Years of education: Not on file   • Highest education level: Not on file   Tobacco Use   • Smoking status: Former Smoker     Packs/day: 0.50     Years: 35.00     Pack years: 17.50   • Smokeless tobacco: Never Used   • Tobacco comment: On and Off   Substance and Sexual Activity   • Alcohol use: Yes     Comment: once-twice/yr.   • Drug use: No   • Sexual activity: Yes     Partners: Male     Birth control/protection: None       Prior to Admission medications    Medication Sig Start Date End Date Taking? Authorizing Provider   Magnesium Oxide (Mag-200) 200 MG tablet Take 200 mg by mouth Daily.   Yes ProviderNissa MD   NON FORMULARY Sovereign silver   Yes Provider, MD Nissa         Objective   Physical Exam  54-year-old awake alert no acute distress HEENT extraocular muscle tach pupils equal and reactive no nystagmus mouth clear neck supple no adenopathy no meningeal signs no JVD no bruits lungs clear no retractions heart regular without murmur M was soft no tenderness no pulsatile masses extremities pulses are equal, extremities no edema cords or Homans sign or evidence of DVT.  Patient's awake alert orientated x4 no facial asymmetry normal speech no drift the arms legs no ataxia she walks without problems no focal findings  Procedures          ED Course      Results for orders placed or performed during the hospital encounter of 08/22/20   Urine Culture - Urine, Urine, Clean Catch   Result Value Ref Range    Urine Culture Growth  present, too young to evaluate    Comprehensive Metabolic Panel   Result Value Ref Range    Glucose 415 (C) 65 - 99 mg/dL    BUN      Creatinine 0.65 0.57 - 1.00 mg/dL    Sodium 127 (L) 136 - 145 mmol/L    Potassium 3.8 3.5 - 5.2 mmol/L    Chloride 90 (L) 98 - 107 mmol/L    CO2 18.0 (L) 22.0 - 29.0 mmol/L    Calcium 9.2 8.6 - 10.5 mg/dL    Total Protein 6.5 6.0 - 8.5 g/dL    Albumin 4.10 3.50 - 5.20 g/dL    ALT (SGPT) 11 1 - 33 U/L    AST (SGOT) 11 1 - 32 U/L    Alkaline Phosphatase 110 39 - 117 U/L    Total Bilirubin 0.3 0.0 - 1.2 mg/dL    eGFR Non African Amer 95 >60 mL/min/1.73    Globulin 2.4 gm/dL    A/G Ratio 1.7 g/dL    BUN/Creatinine Ratio      Anion Gap 19.0 (H) 5.0 - 15.0 mmol/L   Troponin   Result Value Ref Range    Troponin T <0.010 0.000 - 0.030 ng/mL   Urinalysis With Culture If Indicated - Urine, Clean Catch   Result Value Ref Range    Color, UA Yellow Yellow, Straw    Appearance, UA Clear Clear    pH, UA <=5.0 5.0 - 8.0    Specific Gravity, UA 1.041 (H) 1.005 - 1.030    Glucose, UA >=1000 mg/dL (3+) (A) Negative    Ketones, UA 40 mg/dL (2+) (A) Negative    Bilirubin, UA Negative Negative    Blood, UA Negative Negative    Protein,  mg/dL (2+) (A) Negative    Leuk Esterase, UA Negative Negative    Nitrite, UA Negative Negative    Urobilinogen, UA 0.2 E.U./dL 0.2 - 1.0 E.U./dL   CBC Auto Differential   Result Value Ref Range    WBC 11.50 (H) 3.40 - 10.80 10*3/mm3    RBC 5.08 3.77 - 5.28 10*6/mm3    Hemoglobin 16.0 (H) 12.0 - 15.9 g/dL    Hematocrit 46.7 (H) 34.0 - 46.6 %    MCV 91.9 79.0 - 97.0 fL    MCH 31.4 26.6 - 33.0 pg    MCHC 34.2 31.5 - 35.7 g/dL    RDW 13.7 12.3 - 15.4 %    RDW-SD 44.2 37.0 - 54.0 fl    MPV 8.9 6.0 - 12.0 fL    Platelets 311 140 - 450 10*3/mm3    Neutrophil % 60.4 42.7 - 76.0 %    Lymphocyte % 30.3 19.6 - 45.3 %    Monocyte % 6.0 5.0 - 12.0 %    Eosinophil % 2.5 0.3 - 6.2 %    Basophil % 0.8 0.0 - 1.5 %    Neutrophils, Absolute 6.90 1.70 - 7.00 10*3/mm3    Lymphocytes,  Absolute 3.50 (H) 0.70 - 3.10 10*3/mm3    Monocytes, Absolute 0.70 0.10 - 0.90 10*3/mm3    Eosinophils, Absolute 0.30 0.00 - 0.40 10*3/mm3    Basophils, Absolute 0.10 0.00 - 0.20 10*3/mm3    nRBC 0.1 0.0 - 0.2 /100 WBC   Urinalysis, Microscopic Only - Urine, Clean Catch   Result Value Ref Range    RBC, UA 0-2 (A) None Seen /HPF    WBC, UA 21-30 (A) None Seen /HPF    Bacteria, UA Trace (A) None Seen /HPF    Squamous Epithelial Cells, UA 0-2 None Seen, 0-2 /HPF    Hyaline Casts, UA 3-6 None Seen /LPF    Methodology Automated Microscopy    BUN   Result Value Ref Range    BUN 13 6 - 20 mg/dL   Blood Gas, Venous   Result Value Ref Range    Site Right Brachial     pH, Venous 7.402 7.320 - 7.430 pH Units    pCO2, Venous 33.6 (L) 42.0 - 51.0 mm Hg    pO2, Venous 42.2 >=40.0 mm Hg    HCO3, Venous 20.9 mmol/L    Base Excess, Venous -3.0 mmol/L    O2 Saturation, Venous 78.3 %    CO2 Content 21.9 (L) 22 - 29 mmol/L    Barometric Pressure for Blood Gas      Modality Room Air    Basic Metabolic Panel   Result Value Ref Range    Glucose 299 (H) 65 - 99 mg/dL    BUN      Creatinine 0.56 (L) 0.57 - 1.00 mg/dL    Sodium 134 (L) 136 - 145 mmol/L    Potassium 3.6 3.5 - 5.2 mmol/L    Chloride 100 98 - 107 mmol/L    CO2 18.0 (L) 22.0 - 29.0 mmol/L    Calcium 7.9 (L) 8.6 - 10.5 mg/dL    eGFR Non African Amer 113 >60 mL/min/1.73    BUN/Creatinine Ratio      Anion Gap 16.0 (H) 5.0 - 15.0 mmol/L   CBC Auto Differential   Result Value Ref Range    WBC 11.20 (H) 3.40 - 10.80 10*3/mm3    RBC 4.29 3.77 - 5.28 10*6/mm3    Hemoglobin 13.3 12.0 - 15.9 g/dL    Hematocrit 39.6 34.0 - 46.6 %    MCV 92.1 79.0 - 97.0 fL    MCH 30.9 26.6 - 33.0 pg    MCHC 33.5 31.5 - 35.7 g/dL    RDW 13.9 12.3 - 15.4 %    RDW-SD 45.1 37.0 - 54.0 fl    MPV 8.8 6.0 - 12.0 fL    Platelets 225 140 - 450 10*3/mm3   Scan Slide   Result Value Ref Range    Scan Slide     BUN   Result Value Ref Range    BUN 11 6 - 20 mg/dL   Manual Differential   Result Value Ref Range     Neutrophil % 58.0 42.7 - 76.0 %    Lymphocyte % 24.0 19.6 - 45.3 %    Monocyte % 5.0 5.0 - 12.0 %    Eosinophil % 5.0 0.3 - 6.2 %    Bands %  8.0 (H) 0.0 - 5.0 %    Neutrophils Absolute 7.39 (H) 1.70 - 7.00 10*3/mm3    Lymphocytes Absolute 2.69 0.70 - 3.10 10*3/mm3    Monocytes Absolute 0.56 0.10 - 0.90 10*3/mm3    Eosinophils Absolute 0.56 (H) 0.00 - 0.40 10*3/mm3    RBC Morphology Normal Normal    Toxic Granulation Slight/1+ None Seen    Large Platelets Slight/1+ None Seen   Acetone, Urine, Qualitative - Urine, Clean Catch   Result Value Ref Range    Ketones, UA 80 mg/dL (3+) (A) Negative   POC Glucose Once   Result Value Ref Range    Glucose 437 (C) 70 - 105 mg/dL   POC Glucose Once   Result Value Ref Range    Glucose 339 (H) 70 - 105 mg/dL   POC Glucose Once   Result Value Ref Range    Glucose 275 (H) 70 - 105 mg/dL   POC Glucose Once   Result Value Ref Range    Glucose 281 (H) 70 - 105 mg/dL   POC Glucose Once   Result Value Ref Range    Glucose 385 (H) 70 - 105 mg/dL   POC Glucose Once   Result Value Ref Range    Glucose 281 (H) 70 - 105 mg/dL   POC Glucose Once   Result Value Ref Range    Glucose 295 (H) 70 - 105 mg/dL   POC Glucose Once   Result Value Ref Range    Glucose 334 (H) 70 - 105 mg/dL     No radiology results for the last day  Medications   sodium chloride 0.9 % flush 10 mL (has no administration in time range)   dextrose (GLUTOSE) oral gel 15 g (has no administration in time range)   dextrose (D50W) 25 g/ 50mL Intravenous Solution 25 g (has no administration in time range)   glucagon (human recombinant) (GLUCAGEN DIAGNOSTIC) injection 1 mg (has no administration in time range)   sodium chloride 0.9 % flush 10 mL (10 mL Intravenous Given 8/23/20 2023)   sodium chloride 0.9 % flush 10 mL (has no administration in time range)   acetaminophen (TYLENOL) tablet 650 mg (has no administration in time range)     Or   acetaminophen (TYLENOL) 160 MG/5ML solution 650 mg (has no administration in time range)      Or   acetaminophen (TYLENOL) suppository 650 mg (has no administration in time range)   melatonin tablet 5 mg (has no administration in time range)   bisacodyl (DULCOLAX) EC tablet 5 mg (5 mg Oral Given 8/23/20 2023)   magnesium hydroxide (MILK OF MAGNESIA) suspension 2400 mg/10mL 10 mL (has no administration in time range)   ondansetron (ZOFRAN) tablet 4 mg (has no administration in time range)     Or   ondansetron (ZOFRAN) injection 4 mg (has no administration in time range)   aluminum-magnesium hydroxide-simethicone (MAALOX MAX) 400-400-40 MG/5ML suspension 15 mL (has no administration in time range)   insulin lispro (humaLOG) injection 0-9 Units (6 Units Subcutaneous Given 8/23/20 1725)     And   insulin lispro (humaLOG) injection 0-9 Units (has no administration in time range)   sodium chloride 0.9 % infusion (75 mL/hr Intravenous New Bag 8/23/20 1641)   cefTRIAXone (ROCEPHIN) 1 g in sodium chloride 0.9 % 100 mL IVPB (0 g Intravenous Stopped 8/23/20 2054)   insulin glargine (LANTUS) injection 15 Units (15 Units Subcutaneous Given 8/23/20 1443)   insulin lispro (humaLOG) injection 5 Units (5 Units Subcutaneous Given 8/23/20 1724)   sodium chloride 0.9 % bolus 1,000 mL (0 mL Intravenous Stopped 8/22/20 2218)   sodium chloride 0.9 % bolus 1,000 mL (1,000 mL Intravenous New Bag 8/22/20 2218)   insulin regular (humuLIN R,novoLIN R) injection 8 Units (8 Units Intravenous Given 8/22/20 2217)   cefTRIAXone (ROCEPHIN) in SWFI 1 gram/10ml IV PUSH syringe (1 g Intravenous Given 8/22/20 2230)          EKG my interpretation normal sinus rhythm rate of 83 normal axis no hypertrophy QTC of 4 and 36 normal EKG                                  MDM  Number of Diagnoses or Management Options  Diabetes mellitus, new onset (CMS/HCC):   Urinary tract infection without hematuria, site unspecified:   Diagnosis management comments: Medical decision making.  Patient IV established given 2 L of saline and had the above exam and  evaluation.  Patient's chemistries blood sugar 415 CO2 of 18 the patient had a blood gas pH of 7.4 on a venous gas urine showed some ketones and about 30 white cells that was cultured probably was negative EKG unremarkable.  She was given Rocephin 1 g IV for the urine.  Patient was given regular insulin 8 units IV.  Patient sugar was started to come down she was resting company no distress.  This is a new onset diabetes she correlates this with recent hormones that she had taken.  She is resting comfortably she looks well otherwise.  Her pH is normal she is a little dehydrated with a CO2 of 18 and some ketones in her urine but do not see any evidence of suggest acute diabetic ketoacidosis.  Hospitalist notified and patient be placed in the hospital for further care stable unremarkable improved ER course      Final diagnoses:   Diabetes mellitus, new onset (CMS/HCC)   Urinary tract infection without hematuria, site unspecified            Selvin Montelongo MD  08/23/20 2345      Electronically signed by Selvin Montelongo MD at 08/23/20 2345          Physician Progress Notes (last 72 hours) (Notes from 08/21/20 1651 through 08/24/20 1651)      Jess Leija MD at 08/23/20 0829                [facility] Medicine Services Daily Progress Note      Hospitalist Team  LOS 1 days      Patient Care Team:  Provider, No Known as PCP - General    Patient Location: [facility]/1      Subjective   Subjective     Chief Complaint / Subjective  Chief Complaint   Patient presents with   • Hyperglycemia         Brief Synopsis of Hospital Course/HPI      Ms. Car is a 54 y.o. female with a history of abnormal uterine bleeding who was recently placed on progesterone p.o. presented to the [facility] ED on 8/22/2020 with a complaint of hyperglycemia, polydipsia and polyuria.  Patient states that she was on progesterone x21 days per her OB/GYN, for abnormal uterine bleeding.  Patient states she has had increased  "thirst and increased urination since she has been on progesterone.  Patient states she took her blood sugar at a friend's house yesterday and the glucometer read high.  Patient does have a family history of diabetes on her mom's side.  Patient denies fever, chills, nausea, vomiting, diarrhea, or ill contacts.     In the ED, glucose 437, troponin<0.01, sodium 127, potassium 3.8, CO2 18.0, anion gap 19.0, BUN 13, creatinine 0.65, WBC 11.5, Hgb 16.0, HCT 46.7, platelets 311.  UA specific gravity 1.041, glucose 3+, ketones 2+, protein 2+, 21-30 WBC, trace bacteria, urine culture pending.  EKG: Normal sinus rhythm.  pH 7.402.  Patient received 2 L normal saline IV bolus, 8 units regular insulin, 1 g Rocephin IV.        Admitted for UTI and newly diagnosed diabetes        Date:: 8/23/2020  The patient is seen today at bedside she has no new complaints , reports increased thirst and urination        Review of Systems   Constitution: Negative.   HENT: Negative.    Cardiovascular: Negative.    Respiratory: Negative.    Skin: Negative.    Gastrointestinal: Negative.         Polydipsia   Genitourinary: Positive for frequency.   Neurological: Negative.          Objective   Objective      Vital Signs  Temp:  [98 °F (36.7 °C)-98.3 °F (36.8 °C)] 98 °F (36.7 °C)  Heart Rate:  [79-99] 79  Resp:  [15-18] 15  BP: (108-153)/(65-90) 108/65  Oxygen Therapy  SpO2: 96 %  Pulse Oximetry Type: Intermittent  Device (Oxygen Therapy): room air  Device (Oxygen Therapy): room air  Flowsheet Rows      First Filed Value   Admission Height  172.7 cm (68\") Documented at 08/22/2020 1910   Admission Weight  83.5 kg (184 lb 1.4 oz) Documented at 08/22/2020 1910        Intake & Output (last 3 days)       08/20 0701 - 08/21 0700 08/21 0701 - 08/22 0700 08/22 0701 - 08/23 0700 08/23 0701 - 08/24 0700    IV Piggyback   1000     Total Intake(mL/kg)   1000 (12.3)     Net   +1000                 Lines, Drains & Airways    Active LDAs     Name:   Placement " date:   Placement time:   Site:   Days:    Peripheral IV 08/22/20 2136 Right Arm   08/22/20 2136    Arm   less than 1                  Physical Exam:    Physical Exam   Constitutional: She is oriented to person, place, and time.   HENT:   Head: Normocephalic and atraumatic.   Eyes: Pupils are equal, round, and reactive to light. EOM are normal.   Dry mucosa   Neck: Neck supple.   Cardiovascular: Normal rate, regular rhythm, normal heart sounds and intact distal pulses.   Pulmonary/Chest: Effort normal and breath sounds normal.   Abdominal: Soft. Bowel sounds are normal.   Neurological: She is alert and oriented to person, place, and time.   Skin: Skin is warm and dry.   Nursing note and vitals reviewed.            Results Review:     I reviewed the patient's new clinical results.      Lab Results (last 24 hours)     Procedure Component Value Units Date/Time    BUN [[id number]]  (Normal) Collected:  08/23/20 0316    Specimen:  Blood Updated:  08/23/20 0728     BUN 11 mg/dL     POC Glucose Once [[id number]]  (Abnormal) Collected:  08/23/20 0701    Specimen:  Blood Updated:  08/23/20 0702     Glucose 281 mg/dL      Comment: Serial Number: [id number]:  [id number]       Scan Slide [[id number]] Collected:  08/23/20 0316    Specimen:  Blood Updated:  08/23/20 0651     Scan Slide --     Comment: See Manual Differential Results       Manual Differential [[id number]]  (Abnormal) Collected:  08/23/20 0316    Specimen:  Blood Updated:  08/23/20 0651     Neutrophil % 58.0 %      Lymphocyte % 24.0 %      Monocyte % 5.0 %      Eosinophil % 5.0 %      Bands %  8.0 %      Neutrophils Absolute 7.39 10*3/mm3      Lymphocytes Absolute 2.69 10*3/mm3      Monocytes Absolute 0.56 10*3/mm3      Eosinophils Absolute 0.56 10*3/mm3      RBC Morphology Normal     Toxic Granulation Slight/1+     Large Platelets Slight/1+    CBC Auto Differential [[id number]]  (Abnormal) Collected:  08/23/20 0316    Specimen:  Blood Updated:  08/23/20 0651      WBC 11.20 10*3/mm3      RBC 4.29 10*6/mm3      Hemoglobin 13.3 g/dL      Comment: Result checked         Hematocrit 39.6 %      MCV 92.1 fL      MCH 30.9 pg      MCHC 33.5 g/dL      RDW 13.9 %      RDW-SD 45.1 fl      MPV 8.8 fL      Platelets 225 10*3/mm3     Basic Metabolic Panel [[id number]]  (Abnormal) Collected:  08/23/20 0316    Specimen:  Blood Updated:  08/23/20 0454     Glucose 299 mg/dL      BUN --     Comment: Testing performed by alternate method        Creatinine 0.56 mg/dL      Sodium 134 mmol/L      Potassium 3.6 mmol/L      Comment: Specimen hemolyzed.  Results may be affected.        Chloride 100 mmol/L      Comment: Result checked         CO2 18.0 mmol/L      Calcium 7.9 mg/dL      eGFR Non African Amer 113 mL/min/1.73      BUN/Creatinine Ratio --     Comment: Testing not performed        Anion Gap 16.0 mmol/L     Narrative:       GFR Normal >60  Chronic Kidney Disease <60  Kidney Failure <15      POC Glucose Once [[id number]]  (Abnormal) Collected:  08/23/20 0005    Specimen:  Blood Updated:  08/23/20 0006     Glucose 275 mg/dL      Comment: Serial Number: [id number]:  [id number]       Hemoglobin A1c [[id number]] Collected:  08/22/20 1936    Specimen:  Blood Updated:  08/22/20 2322    POC Glucose Once [[id number]]  (Abnormal) Collected:  08/22/20 2214    Specimen:  Blood Updated:  08/22/20 2215     Glucose 339 mg/dL      Comment: Serial Number: [id number]:  [id number]       Blood Gas, Venous [[id number]]  (Abnormal) Collected:  08/22/20 2205    Specimen:  Venous Blood Updated:  08/22/20 2209     Site Right Brachial     pH, Venous 7.402 pH Units      pCO2, Venous 33.6 mm Hg      pO2, Venous 42.2 mm Hg      HCO3, Venous 20.9 mmol/L      Base Excess, Venous -3.0 mmol/L      Comment: Serial Number: [id number]:  [id number]        O2 Saturation, Venous 78.3 %      CO2 Content 21.9 mmol/L      Barometric Pressure for Blood Gas --     Comment: N/A        Modality Room Air    Comprehensive  Metabolic Panel [[id number]]  (Abnormal) Collected:  08/22/20 2020    Specimen:  Blood from Arm, Right Updated:  08/22/20 2110     Glucose 415 mg/dL      BUN --     Comment: Testing performed by alternate method        Creatinine 0.65 mg/dL      Sodium 127 mmol/L      Potassium 3.8 mmol/L      Comment: Specimen hemolyzed.  Results may be affected.        Chloride 90 mmol/L      CO2 18.0 mmol/L      Calcium 9.2 mg/dL      Total Protein 6.5 g/dL      Albumin 4.10 g/dL      ALT (SGPT) 11 U/L      AST (SGOT) 11 U/L      Comment: Specimen hemolyzed.  Results may be affected.        Alkaline Phosphatase 110 U/L      Total Bilirubin 0.3 mg/dL      eGFR Non African Amer 95 mL/min/1.73      Globulin 2.4 gm/dL      A/G Ratio 1.7 g/dL      BUN/Creatinine Ratio --     Comment: Testing not performed        Anion Gap 19.0 mmol/L     Narrative:       GFR Normal >60  Chronic Kidney Disease <60  Kidney Failure <15      BUN [[id number]]  (Normal) Collected:  08/22/20 2020    Specimen:  Blood from Arm, Right Updated:  08/22/20 2104     BUN 13 mg/dL     Troponin [[id number]]  (Normal) Collected:  08/22/20 2020    Specimen:  Blood from Arm, Right Updated:  08/22/20 2055     Troponin T <0.010 ng/mL     Narrative:       Troponin T Reference Range:  <= 0.03 ng/mL-   Negative for AMI  >0.03 ng/mL-     Abnormal for myocardial necrosis.  Clinicians would have to utilize clinical acumen, EKG, Troponin and serial changes to determine if it is an Acute Myocardial Infarction or myocardial injury due to an underlying chronic condition.       Results may be falsely decreased if patient taking Biotin.      Urinalysis, Microscopic Only - Urine, Clean Catch [[id number]]  (Abnormal) Collected:  08/22/20 1935    Specimen:  Urine, Clean Catch Updated:  08/22/20 1948     RBC, UA 0-2 /HPF      WBC, UA 21-30 /HPF      Bacteria, UA Trace /HPF      Squamous Epithelial Cells, UA 0-2 /HPF      Hyaline Casts, UA 3-6 /LPF      Methodology Automated Microscopy     Urinalysis With Culture If Indicated - Urine, Clean Catch [[id number]]  (Abnormal) Collected:  08/22/20 1935    Specimen:  Urine, Clean Catch Updated:  08/22/20 1948     Color, UA Yellow     Appearance, UA Clear     pH, UA <=5.0     Specific Gravity, UA 1.041     Glucose, UA >=1000 mg/dL (3+)     Ketones, UA 40 mg/dL (2+)     Bilirubin, UA Negative     Blood, UA Negative     Protein,  mg/dL (2+)     Leuk Esterase, UA Negative     Nitrite, UA Negative     Urobilinogen, UA 0.2 E.U./dL    Urine Culture - Urine, Urine, Clean Catch [[id number]] Collected:  08/22/20 1935    Specimen:  Urine, Clean Catch Updated:  08/22/20 1948    CBC & Differential [[id number]] Collected:  08/22/20 1936    Specimen:  Blood Updated:  08/22/20 1943    Narrative:       The following orders were created for panel order CBC & Differential.  Procedure                               Abnormality         Status                     ---------                               -----------         ------                     CBC Auto Differential[[id number]]        Abnormal            Final result                 Please view results for these tests on the individual orders.    CBC Auto Differential [[id number]]  (Abnormal) Collected:  08/22/20 1936    Specimen:  Blood Updated:  08/22/20 1943     WBC 11.50 10*3/mm3      RBC 5.08 10*6/mm3      Hemoglobin 16.0 g/dL      Hematocrit 46.7 %      MCV 91.9 fL      MCH 31.4 pg      MCHC 34.2 g/dL      RDW 13.7 %      RDW-SD 44.2 fl      MPV 8.9 fL      Platelets 311 10*3/mm3      Neutrophil % 60.4 %      Lymphocyte % 30.3 %      Monocyte % 6.0 %      Eosinophil % 2.5 %      Basophil % 0.8 %      Neutrophils, Absolute 6.90 10*3/mm3      Lymphocytes, Absolute 3.50 10*3/mm3      Monocytes, Absolute 0.70 10*3/mm3      Eosinophils, Absolute 0.30 10*3/mm3      Basophils, Absolute 0.10 10*3/mm3      nRBC 0.1 /100 WBC     POC Glucose Once [[id number]]  (Abnormal) Collected:  08/22/20 1916    Specimen:  Blood Updated:   08/22/20 1917     Glucose 437 mg/dL      Comment: Serial Number: [id number]:  [id number]           No results found for: HGBA1C            No results found for: LIPASE  Lab Results   Component Value Date    CHLPL 203 (H) 01/09/2015    TRIG 563 (H) 01/09/2015    HDL 35 (L) 01/09/2015    LDL  01/09/2015      Comment:      Unable to calculate due to elevated Triglycerides.                      LDL References Ranges  (U.S. Department of Health and Human Services ATP III Classifications)                Optimal                       <100 mg/dL                Near Optimal               100-129 mg/dL                Borderline High            130-159 mg/dL                High                             160-189 mg/dL                Very High                     >189 mg/dL         No results found for: INTRAOP, PREDX, FINALDX, COMDX    Microbiology Results (last 10 days)     ** No results found for the last 240 hours. **          ECG/EMG Results (most recent)     Procedure Component Value Units Date/Time    ECG 12 Lead [[id number]] Collected:  08/22/20 1954     Updated:  08/23/20 0818    Narrative:       HEART RATE= 83  bpm  RR Interval= 724  ms  MN Interval= 142  ms  P Horizontal Axis= 6  deg  P Front Axis= 53  deg  QRSD Interval= 65  ms  QT Interval= 371  ms  QRS Axis= 52  deg  T Wave Axis= 23  deg  - OTHERWISE NORMAL ECG -  Sinus rhythm  Low voltage, precordial leads  No previous ECG available for comparison  Electronically Signed By: Selvin Montelongo (GAMAL) 23-Aug-2020 08:17:28  Date and Time of Study: 2020-08-22 19:54:25                    No radiology results for the last 7 days        Xrays, labs reviewed personally by physician.    Medication Review:   I have reviewed the patient's current medication list      Scheduled Meds    cefTRIAXone 1 g Intravenous Q24H   insulin lispro 0-9 Units Subcutaneous TID AC   sodium chloride 10 mL Intravenous Q12H       Meds Infusions    sodium chloride 75 mL/hr Last Rate: 75 mL/hr  (08/23/20 7569)       Meds PRN  •  acetaminophen **OR** acetaminophen **OR** acetaminophen  •  aluminum-magnesium hydroxide-simethicone  •  bisacodyl  •  dextrose  •  dextrose  •  glucagon (human recombinant)  •  insulin lispro **AND** insulin lispro  •  magnesium hydroxide  •  melatonin  •  ondansetron **OR** ondansetron  •  [COMPLETED] Insert peripheral IV **AND** sodium chloride  •  sodium chloride        Assessment/Plan   Assessment/Plan     Active Hospital Problems    Diagnosis  POA   • Diabetes mellitus, new onset (CMS/HCC) [E11.9]  Yes      Resolved Hospital Problems   No resolved problems to display.       MEDICAL DECISION MAKING COMPLEXITY BY PROBLEM:        Ms. Car is a 54 y.o. female with a history of abnormal uterine bleeding who was recently placed on progesterone p.o. presented to the [facility] ED on 8/22/2020 with a complaint of hyperglycemia, polydipsia and polyuria.  Patient states that she was on progesterone x21 days per her OB/GYN, for abnormal uterine bleeding.  Patient states she has had increased thirst and increased urination since she has been on progesterone.  Patient states she took her blood sugar at a friend's house yesterday and the glucometer read high.  Patient does have a family history of diabetes on her mom's side.  Patient denies fever, chills, nausea, vomiting, diarrhea, or ill contacts.     In the ED, glucose 437, troponin<0.01, sodium 127, potassium 3.8, CO2 18.0, anion gap 19.0, BUN 13, creatinine 0.65, WBC 11.5, Hgb 16.0, HCT 46.7, platelets 311.  UA specific gravity 1.041, glucose 3+, ketones 2+, protein 2+, 21-30 WBC, trace bacteria, urine culture pending.  EKG: Normal sinus rhythm.  pH 7.402.  Patient received 2 L normal saline IV bolus, 8 units regular insulin, 1 g Rocephin IV.       Admitted for UTI and newly diagnosed diabetes type 2    Assessment    Newly diagnosed diabetes type 2  Glucose 437 upon admission  HbA1c in progress   Polydipsia, frequency of  urination  NS at 75 mL/h  Accu-Cheks before meals and at bedtime  Sliding scale insulin  Diabetes educator to see  Endocrinology consult  Start Lantus 10 units at bedtime     Urinary tract infection  Continue Rocephin  Follow urine culture    Proteinuria  Suspect secondary to diabetes type 2  May benefit from ACE        She reports minimal portal bleed  Patient was started on Provera as outpatient  Follow-up with gynecology as outpatient    History of kidney stones  Currently asymptomatic    VTE Prophylaxis -   Mechanical Order History:      Ordered        08/22/20 2301  Place Sequential Compression Device  Once         08/22/20 2301  Maintain Sequential Compression Device  Continuous                 Pharmalogical Order History:     None            Code Status -   Code Status and Medical Interventions:   Ordered at: 08/22/20 2254     Level Of Support Discussed With:    Patient     Code Status:    CPR     Medical Interventions (Level of Support Prior to Arrest):    Full           Discharge Planning  And clinical improvement          Electronically signed by Jess Leija MD, 08/23/20, 08:49.  [facility] Hospitalist Team        Electronically signed by Jess Leija MD at 08/23/20 1311          Consult Notes (last 72 hours) (Notes from 08/21/20 1651 through 08/24/20 1651)      Nelda Mena MD at 08/23/20 1701      Consult Orders    1. Inpatient Endocrinology Consult [[id number]] ordered by Jess Leija MD at 08/23/20 1311                [facility]    Referring Provider:Dr. Jess Leija  Reason for Consultation: Diabetes evaluation & management.    Patient Care Team:  Provider, No Known as PCP - General    Chief complaint Hyperglycemia      Subjective .     History of present illness:    This is a  54 y.o. female with no previous history of Diabetes, seen in the ER because of blood sugar in the 400's yesterday.  C/o polyuria, polydipsia & 20 lb wt loss in the last 2  months. Also has some dizziness & decreased appetite.  Drinking mostly apple juice & milk.  Positive family history of diabetes.  Does not have a PCP.  Seeing GYN for post menopausal bleeding. Started of progesterone in June.    Review of Systems  Review of Systems   Constitutional: Positive for appetite change and unexpected weight loss.   HENT: Negative for trouble swallowing.    Eyes: Negative for blurred vision.   Respiratory: Negative for shortness of breath.    Cardiovascular: Negative for leg swelling.   Gastrointestinal: Negative for nausea.   Endocrine: Positive for polydipsia and polyuria.   Genitourinary: Negative for dysuria.        Nocturia Q 2h   Neurological: Negative for headache.       History  Past Medical History:   Diagnosis Date   • Allergic    • Diabetes mellitus (CMS/HCC)     New onset   • Renal calculi     from calcium   • Spinal stenosis      History reviewed. No pertinent surgical history.  Family History   Problem Relation Age of Onset   • Heart disease Mother    • Diabetes Mother    • Arthritis Mother    • Hypertension Father    • Diabetes Father    • Stroke Father    • No Known Problems Brother      Social History     Tobacco Use   • Smoking status: Former Smoker     Packs/day: 0.50     Years: 35.00     Pack years: 17.50   • Smokeless tobacco: Never Used   • Tobacco comment: On and Off   Substance Use Topics   • Alcohol use: Yes     Comment: once-twice/yr.   • Drug use: No     Medications Prior to Admission   Medication Sig Dispense Refill Last Dose   • Magnesium Oxide (Mag-200) 200 MG tablet Take 200 mg by mouth Daily.      • NON FORMULARY Sovereign silver        Scheduled Meds:    cefTRIAXone 1 g Intravenous Q24H   insulin glargine 15 Units Subcutaneous QAM   insulin lispro 0-9 Units Subcutaneous TID AC   insulin lispro 5 Units Subcutaneous TID With Meals   sodium chloride 10 mL Intravenous Q12H     Continuous Infusions:    sodium chloride 75 mL/hr Last Rate: 75 mL/hr (08/23/20 1280)      PRN Meds:  •  acetaminophen **OR** acetaminophen **OR** acetaminophen  •  aluminum-magnesium hydroxide-simethicone  •  bisacodyl  •  dextrose  •  dextrose  •  glucagon (human recombinant)  •  insulin lispro **AND** insulin lispro  •  magnesium hydroxide  •  melatonin  •  ondansetron **OR** ondansetron  •  [COMPLETED] Insert peripheral IV **AND** sodium chloride  •  sodium chloride  Allergies:  Blue dyes (parenteral)    Objective     Vital Signs   Temp:  [97.4 °F (36.3 °C)-98.3 °F (36.8 °C)] 97.4 °F (36.3 °C)  Heart Rate:  [77-99] 82  Resp:  [15-18] 16  BP: (108-153)/(65-90) 120/76    Physical Exam:     General Appearance:    Alert, cooperative, in no acute distress   Head:    Normocephalic, without obvious abnormality, atraumatic   Eyes:            Lids and lashes normal, conjunctivae and sclerae normal, no   icterus, no pallor, corneas clear, PERRLA   Throat:   No oral lesions,  oral mucosa moist   Neck:   No adenopathy, supple,  no thyromegaly, no   carotid bruit   Lungs:     Clear    Heart:    Regular rhythm and normal rate   Chest Wall:    No abnormalities observed   Abdomen:     Normal bowel sounds, soft                 Extremities:   Moves all extremities well, no edema               Pulses:   Pulses palpable and equal bilaterally   Skin:   Dry. No bruising or rash   Neurologic:  DTR absent, able to feel the 10g monofilament       Results Review  I have reviewed the patient's new clinical results, labs & imaging.    Lab Results (last 24 hours)     Procedure Component Value Units Date/Time    POC Glucose Once [[id number]]  (Abnormal) Collected:  08/23/20 1600    Specimen:  Blood Updated:  08/23/20 1601     Glucose 295 mg/dL      Comment: Serial Number: [id number]:  [id number]       Urine Culture - Urine, Urine, Clean Catch [[id number]]  (Normal) Collected:  08/22/20 1935    Specimen:  Urine, Clean Catch Updated:  08/23/20 1245     Urine Culture Growth present, too young to evaluate    POC Glucose Once  [[id number]]  (Abnormal) Collected:  08/23/20 1154    Specimen:  Blood Updated:  08/23/20 1157     Glucose 281 mg/dL      Comment: Serial Number: [id number]:  [id number]       POC Glucose Once [[id number]]  (Abnormal) Collected:  08/23/20 0901    Specimen:  Blood Updated:  08/23/20 0902     Glucose 385 mg/dL      Comment: Serial Number: [id number]:  [id number]       BUN [[id number]]  (Normal) Collected:  08/23/20 0316    Specimen:  Blood Updated:  08/23/20 0728     BUN 11 mg/dL     POC Glucose Once [[id number]]  (Abnormal) Collected:  08/23/20 0701    Specimen:  Blood Updated:  08/23/20 0702     Glucose 281 mg/dL      Comment: Serial Number: [id number]:  [id number]       Scan Slide [[id number]] Collected:  08/23/20 0316    Specimen:  Blood Updated:  08/23/20 0651     Scan Slide --     Comment: See Manual Differential Results       Manual Differential [[id number]]  (Abnormal) Collected:  08/23/20 0316    Specimen:  Blood Updated:  08/23/20 0651     Neutrophil % 58.0 %      Lymphocyte % 24.0 %      Monocyte % 5.0 %      Eosinophil % 5.0 %      Bands %  8.0 %      Neutrophils Absolute 7.39 10*3/mm3      Lymphocytes Absolute 2.69 10*3/mm3      Monocytes Absolute 0.56 10*3/mm3      Eosinophils Absolute 0.56 10*3/mm3      RBC Morphology Normal     Toxic Granulation Slight/1+     Large Platelets Slight/1+    CBC Auto Differential [[id number]]  (Abnormal) Collected:  08/23/20 0316    Specimen:  Blood Updated:  08/23/20 0651     WBC 11.20 10*3/mm3      RBC 4.29 10*6/mm3      Hemoglobin 13.3 g/dL      Comment: Result checked         Hematocrit 39.6 %      MCV 92.1 fL      MCH 30.9 pg      MCHC 33.5 g/dL      RDW 13.9 %      RDW-SD 45.1 fl      MPV 8.8 fL      Platelets 225 10*3/mm3     Basic Metabolic Panel [[id number]]  (Abnormal) Collected:  08/23/20 0316    Specimen:  Blood Updated:  08/23/20 0454     Glucose 299 mg/dL      BUN --     Comment: Testing performed by alternate method        Creatinine 0.56 mg/dL       Sodium 134 mmol/L      Potassium 3.6 mmol/L      Comment: Specimen hemolyzed.  Results may be affected.        Chloride 100 mmol/L      Comment: Result checked         CO2 18.0 mmol/L      Calcium 7.9 mg/dL      eGFR Non African Amer 113 mL/min/1.73      BUN/Creatinine Ratio --     Comment: Testing not performed        Anion Gap 16.0 mmol/L     Narrative:       GFR Normal >60  Chronic Kidney Disease <60  Kidney Failure <15      POC Glucose Once [[id number]]  (Abnormal) Collected:  08/23/20 0005    Specimen:  Blood Updated:  08/23/20 0006     Glucose 275 mg/dL      Comment: Serial Number: [id number]:  [id number]       Hemoglobin A1c [[id number]] Collected:  08/22/20 1936    Specimen:  Blood Updated:  08/22/20 2322    POC Glucose Once [[id number]]  (Abnormal) Collected:  08/22/20 2214    Specimen:  Blood Updated:  08/22/20 2215     Glucose 339 mg/dL      Comment: Serial Number: [id number]:  [id number]       Blood Gas, Venous [[id number]]  (Abnormal) Collected:  08/22/20 2205    Specimen:  Venous Blood Updated:  08/22/20 2209     Site Right Brachial     pH, Venous 7.402 pH Units      pCO2, Venous 33.6 mm Hg      pO2, Venous 42.2 mm Hg      HCO3, Venous 20.9 mmol/L      Base Excess, Venous -3.0 mmol/L      Comment: Serial Number: [id number]:  [id number]        O2 Saturation, Venous 78.3 %      CO2 Content 21.9 mmol/L      Barometric Pressure for Blood Gas --     Comment: N/A        Modality Room Air    Comprehensive Metabolic Panel [[id number]]  (Abnormal) Collected:  08/22/20 2020    Specimen:  Blood from Arm, Right Updated:  08/22/20 2110     Glucose 415 mg/dL      BUN --     Comment: Testing performed by alternate method        Creatinine 0.65 mg/dL      Sodium 127 mmol/L      Potassium 3.8 mmol/L      Comment: Specimen hemolyzed.  Results may be affected.        Chloride 90 mmol/L      CO2 18.0 mmol/L      Calcium 9.2 mg/dL      Total Protein 6.5 g/dL      Albumin 4.10 g/dL      ALT (SGPT) 11 U/L      AST  (SGOT) 11 U/L      Comment: Specimen hemolyzed.  Results may be affected.        Alkaline Phosphatase 110 U/L      Total Bilirubin 0.3 mg/dL      eGFR Non African Amer 95 mL/min/1.73      Globulin 2.4 gm/dL      A/G Ratio 1.7 g/dL      BUN/Creatinine Ratio --     Comment: Testing not performed        Anion Gap 19.0 mmol/L     Narrative:       GFR Normal >60  Chronic Kidney Disease <60  Kidney Failure <15      BUN [[id number]]  (Normal) Collected:  08/22/20 2020    Specimen:  Blood from Arm, Right Updated:  08/22/20 2104     BUN 13 mg/dL     Troponin [[id number]]  (Normal) Collected:  08/22/20 2020    Specimen:  Blood from Arm, Right Updated:  08/22/20 2055     Troponin T <0.010 ng/mL     Narrative:       Troponin T Reference Range:  <= 0.03 ng/mL-   Negative for AMI  >0.03 ng/mL-     Abnormal for myocardial necrosis.  Clinicians would have to utilize clinical acumen, EKG, Troponin and serial changes to determine if it is an Acute Myocardial Infarction or myocardial injury due to an underlying chronic condition.       Results may be falsely decreased if patient taking Biotin.      Urinalysis, Microscopic Only - Urine, Clean Catch [[id number]]  (Abnormal) Collected:  08/22/20 1935    Specimen:  Urine, Clean Catch Updated:  08/22/20 1948     RBC, UA 0-2 /HPF      WBC, UA 21-30 /HPF      Bacteria, UA Trace /HPF      Squamous Epithelial Cells, UA 0-2 /HPF      Hyaline Casts, UA 3-6 /LPF      Methodology Automated Microscopy    Urinalysis With Culture If Indicated - Urine, Clean Catch [[id number]]  (Abnormal) Collected:  08/22/20 1935    Specimen:  Urine, Clean Catch Updated:  08/22/20 1948     Color, UA Yellow     Appearance, UA Clear     pH, UA <=5.0     Specific Gravity, UA 1.041     Glucose, UA >=1000 mg/dL (3+)     Ketones, UA 40 mg/dL (2+)     Bilirubin, UA Negative     Blood, UA Negative     Protein,  mg/dL (2+)     Leuk Esterase, UA Negative     Nitrite, UA Negative     Urobilinogen, UA 0.2 E.U./dL    CBC &  Differential [[id number]] Collected:  08/22/20 1936    Specimen:  Blood Updated:  08/22/20 1943    Narrative:       The following orders were created for panel order CBC & Differential.  Procedure                               Abnormality         Status                     ---------                               -----------         ------                     CBC Auto Differential[[id number]]        Abnormal            Final result                 Please view results for these tests on the individual orders.    CBC Auto Differential [[id number]]  (Abnormal) Collected:  08/22/20 1936    Specimen:  Blood Updated:  08/22/20 1943     WBC 11.50 10*3/mm3      RBC 5.08 10*6/mm3      Hemoglobin 16.0 g/dL      Hematocrit 46.7 %      MCV 91.9 fL      MCH 31.4 pg      MCHC 34.2 g/dL      RDW 13.7 %      RDW-SD 44.2 fl      MPV 8.9 fL      Platelets 311 10*3/mm3      Neutrophil % 60.4 %      Lymphocyte % 30.3 %      Monocyte % 6.0 %      Eosinophil % 2.5 %      Basophil % 0.8 %      Neutrophils, Absolute 6.90 10*3/mm3      Lymphocytes, Absolute 3.50 10*3/mm3      Monocytes, Absolute 0.70 10*3/mm3      Eosinophils, Absolute 0.30 10*3/mm3      Basophils, Absolute 0.10 10*3/mm3      nRBC 0.1 /100 WBC     POC Glucose Once [[id number]]  (Abnormal) Collected:  08/22/20 1916    Specimen:  Blood Updated:  08/22/20 1917     Glucose 437 mg/dL      Comment: Serial Number: [id number]:  [id number]             Assessment/Plan     1. Diabetes, likely type 1, with hyperglycemia  2. UTI, on antibiotic Rx    Will recheck urine for ketones & get c-peptide & MYRA AB to check insulin secretion reserve.  Added scheduled pre meal Humalog.  Diabetes educator to see pt tomorrow.  Se is receptive to basal / bolus insulin regimen post discharge.  Will schedule out pt education as well.  Will follow with you.  Thank you for the consult.      I discussed the patients findings and my recommendations with patient    Nelda Trina,  MD  08/23/20  17:01                Electronically signed by Nelda Mena MD at 08/23/20 0898

## 2020-08-24 NOTE — DISCHARGE SUMMARY
[facility] Medicine Services  DISCHARGE SUMMARY        Prepared For PCP:  Provider, No Known    Patient Name: Yadira Car  : 1965  MRN: [id number]      Date of Admission:   2020    Date of Discharge:  2020    Length of stay:  LOS: 2 days     Hospital Course     Presenting Problem:   Diabetes mellitus, new onset (CMS/HCC) [E11.9]  Urinary tract infection without hematuria, site unspecified [N39.0]      Active Hospital Problems    Diagnosis  POA   • Diabetes mellitus, new onset (CMS/HCC) [E11.9]  Yes      Resolved Hospital Problems   No resolved problems to display.           Hospital Course:  Yadira Car is a 54 y.o. female  with a history of abnormal uterine bleeding who was recently placed on progesterone p.o. presented to the [facility] ED on 2020 with a complaint of hyperglycemia, polydipsia and polyuria.  Patient states that she was on progesterone x21 days per her OB/GYN, for abnormal uterine bleeding.  Patient states she has had increased thirst and increased urination since she has been on progesterone.  Patient states she took her blood sugar at a friend's house yesterday and the glucometer read high.  Patient does have a family history of diabetes on her mom's side.  Patient denies fever, chills, nausea, vomiting, diarrhea, or ill contacts.     In the ED, glucose 437, troponin<0.01, sodium 127, potassium 3.8, CO2 18.0, anion gap 19.0, BUN 13, creatinine 0.65, WBC 11.5, Hgb 16.0, HCT 46.7, platelets 311.  UA specific gravity 1.041, glucose 3+, ketones 2+, protein 2+, 21-30 WBC, trace bacteria, urine culture pending.  EKG: Normal sinus rhythm.  pH 7.402.  Patient received 2 L normal saline IV bolus, 8 units regular insulin, 1 g Rocephin IV.        Admitted for UTI and newly diagnosed diabetes type 2    HbA1c 14.1    She was evaluated by endocrinologist      Received diabetic education and counseling    Started on insulin, patient was cleared by  endocrinologist for discharge    She will follow-up with endocrinologist as outpatient    Day of Discharge     HPI:   Ms. Car is a 54 y.o. female with a history of abnormal uterine bleeding who was recently placed on progesterone p.o. presented to the [facility] ED on 8/22/2020 with a complaint of hyperglycemia, polydipsia and polyuria.  Patient states that she was on progesterone x21 days per her OB/GYN, for abnormal uterine bleeding.  Patient states she has had increased thirst and increased urination since she has been on progesterone.  Patient states she took her blood sugar at a friend's house yesterday and the glucometer read high.  Patient does have a family history of diabetes on her mom's side.  Patient denies fever, chills, nausea, vomiting, diarrhea, or ill contacts.     In the ED, glucose 437, troponin<0.01, sodium 127, potassium 3.8, CO2 18.0, anion gap 19.0, BUN 13, creatinine 0.65, WBC 11.5, Hgb 16.0, HCT 46.7, platelets 311.  UA specific gravity 1.041, glucose 3+, ketones 2+, protein 2+, 21-30 WBC, trace bacteria, urine culture pending.  EKG: Normal sinus rhythm.  pH 7.402.  Patient received 2 L normal saline IV bolus, 8 units regular insulin, 1 g Rocephin IV.        Admitted for UTI and newly diagnosed diabetes type 2       Vital Signs:   Temp:  [97.4 °F (36.3 °C)-98.1 °F (36.7 °C)] 98.1 °F (36.7 °C)  Heart Rate:  [75-86] 76  Resp:  [16] 16  BP: (106-120)/(69-80) 112/80     Physical Exam:  Physical Exam   Constitutional: She is oriented to person, place, and time.   HENT:   Head: Normocephalic and atraumatic.   Eyes: Pupils are equal, round, and reactive to light. EOM are normal.   Neck: Neck supple.   Cardiovascular: Normal rate, regular rhythm, normal heart sounds and intact distal pulses.   Pulmonary/Chest: Effort normal and breath sounds normal.   Abdominal: Soft. Bowel sounds are normal.   Neurological: She is alert and oriented to person, place, and time.   Skin: Skin is warm and  dry.   Nursing note and vitals reviewed.      Pertinent  and/or Most Recent Results     Results from last 7 days   Lab Units 08/23/20  0316 08/22/20 2020 08/22/20 1936   WBC 10*3/mm3 11.20*  --  11.50*   HEMOGLOBIN g/dL 13.3  --  16.0*   HEMATOCRIT % 39.6  --  46.7*   PLATELETS 10*3/mm3 225  --  311   SODIUM mmol/L 134* 127*  --    POTASSIUM mmol/L 3.6 3.8  --    CHLORIDE mmol/L 100 90*  --    CO2 mmol/L 18.0* 18.0*  --    BUN  11 13  --    CREATININE mg/dL 0.56* 0.65  --    GLUCOSE mg/dL 299* 415*  --    CALCIUM mg/dL 7.9* 9.2  --      Results from last 7 days   Lab Units 08/22/20 2020   BILIRUBIN mg/dL 0.3   ALK PHOS U/L 110   ALT (SGPT) U/L 11   AST (SGOT) U/L 11           Invalid input(s): TG, LDLCALC, LDLREALC  Results from last 7 days   Lab Units 08/22/20 2020 08/22/20 1936   HEMOGLOBIN A1C %  --  14.1*   TROPONIN T ng/mL <0.010  --        Brief Urine Lab Results  (Last result in the past 365 days)      Color   Clarity   Blood   Leuk Est   Nitrite   Protein   CREAT   Urine HCG        08/22/20 1935 Yellow Clear Negative Negative Negative 100 mg/dL (2+)               Microbiology Results Abnormal     Procedure Component Value - Date/Time    Urine Culture - Urine, Urine, Clean Catch [[id number]] Collected:  08/22/20 1935    Lab Status:  Final result Specimen:  Urine, Clean Catch Updated:  08/24/20 0949     Urine Culture 50,000 CFU/mL Mixed Linsey Isolated    Narrative:       Specimen contains mixed organisms of questionable pathogenicity which indicates contamination with commensal linsey.  Further identification is unlikely to provide clinically useful information.  Suggest recollection.                                      Test Results Pending at Discharge   Order Current Status    Acetone, Urine, Qualitative - Urine, Clean Catch Collected (08/24/20 1058)    C-Peptide In process    Glutamic Acid Decarboxylase In process            Procedures Performed           Consults:   Consults     Date and Time Order  Name Status Description    8/23/2020 1311 Inpatient Endocrinology Consult Completed     8/22/2020 2222 Hospitalist (on-call MD unless specified) Completed             Discharge Details        Discharge Medications      New Medications      Instructions Start Date   insulin glargine 100 UNIT/ML injection  Commonly known as:  LANTUS   15 Units, Subcutaneous, Every Morning   Start Date:  August 25, 2020     insulin lispro 100 UNIT/ML injection  Commonly known as:  humaLOG   0-9 Units, Subcutaneous, 3 Times Daily Before Meals      insulin lispro 100 UNIT/ML injection  Commonly known as:  humaLOG   0-9 Units, Subcutaneous, As Needed      insulin lispro 100 UNIT/ML injection  Commonly known as:  humaLOG   8 Units, Subcutaneous, 3 Times Daily With Meals         Continue These Medications      Instructions Start Date   Mag-200 200 MG tablet  Generic drug:  Magnesium Oxide   200 mg, Oral, Daily      NON FORMULARY   Sovereign silver              Allergies   Allergen Reactions   • Blue Dyes (Parenteral)          Discharge Disposition: Home  Home or Self Care    Diet:  Hospital:  Diet Order   Procedures   • Diet Diabetic/Consistent Carbs; Diabetic - Consistent Carb         Discharge Activity: As tolerated        CODE STATUS:    Code Status and Medical Interventions:   Ordered at: 08/22/20 3610     Level Of Support Discussed With:    Patient     Code Status:    CPR     Medical Interventions (Level of Support Prior to Arrest):    Full         Follow-up Appointments  No future appointments.    Additional Instructions for the Follow-ups that You Need to Schedule     Discharge Follow-up with Specialty: Endocrinology; 1 Week   As directed      Specialty:  Endocrinology    Follow Up:  1 Week                 Condition on Discharge:      Stable        Electronically signed by Jess Leija MD, 08/24/20, 10:59 AM.      Time: I spent  35  minutes on this discharge activity which included face-to-face encounter with the  patient/reviewing the data in the system/coordination of the care with the nursing staff as well as consultants/documentation/entering orders.

## 2020-08-24 NOTE — CONSULTS
"Diabetes Education  Assessment/Teaching    Patient Name:  Yadira Car  YOB: 1965  MRN: [id number]  Admit Date:  8/22/2020      Assessment Date:  8/24/2020    Most Recent Value   General Information    Referral From:  A1c, Blood glucose, MD order [MD consult for newly diagnosed, admission blood sugar 437, and on 8/22/2020 A1c was 14.1%.]   Height  172.7 cm (68\")   Height Method  Stated   Weight  81.6 kg (180 lb)   Weight Method  Bed scale   Pregnancy Assessment   Diabetes History   What type of diabetes do you have?  Unknown   Length of Diabetes Diagnosis  Newly diagnosed <6 months [Diagnosed this admission ]   Current DM knowledge  poor   Have you had diabetes education/teaching in the past?  no   Do you test your blood sugar at home?  no   Have you had high blood sugar? (>140mg/dl)  yes   How often do you have high blood sugar?  unknown   When was your last high blood sugar?  Admission blood sugar 437.   Education Preferences   What areas of diabetes would you like to learn about?  avoiding high blood sugar, avoiding low blood sugar, diabetes complications, diet information, medications for diabetes, testing my blood sugar at home, understanding diabetes   Nutrition Information   Assessment Topics   Healthy Eating - Assessment  Needs education   Taking Medication - Assessment  Needs education   Problem Solving - Assessment  Needs education   Reducing Risk - Assessment  Needs education   Monitoring - Assessment  Needs education   DM Goals   Healthy Eating - Goal  Today   Taking Medication - Goal  Today   Problem Solving - Goal  Today   Reducing Risk - Goal  Today   Monitoring - Goal  Today            Most Recent Value   DM Education Needs   Meter  Meter provided   Meter Type  One Touch [One Touch Verio Flex given with return demonstration completed by patient using the lancing device, inserting the test strip into the meter, and applying blood to the test strip.  Blood sugar 222.]   Frequency of " Testing  AC/HS [Log book given to patient with discussion on checking blood sugars before meals and at bedtime.]   Medication  Insulin, Actions, Side effects, Administration, Vial   Problem Solving  Hypoglycemia, Hyperglycemia, Signs, Symptoms, Treatment [Handouts given with discussion on hypoglycemia and hyperglycemia signs, symptoms, and treatment.]   Reducing Risks  A1C testing, Lipids, Blood pressure, Eye exam, Dental exam, Foot care, Cardiovascular, Neuropathy, Retinopathy [On 8/22/2020 A1c was 14.1%. A1c info sheet given with discussion on A1c target and healthy blood sugar range.]   Healthy Eating  Other (comment) [Handouts given with discussion on 1 serving of carbs being = 15 grams, being allowed 3 servings of carbs per meal, counting carbs, healthy eating guidelines for adults, low carb snacks, healthy food choices, and planning healthy meals.]   Discharge Plan  Home   Motivation  Engaged, Strong   Teaching Method  Discussion, Handouts, Demonstration, Explanation, Teach back   Patient Response  Verbalized understanding, Demonstrates adequately            Other Comments:  First Steps book to managing diabetes given to patient with discussion on diagnosis and diabetes. Pamphlet given on [place] classes. Discussion on how diabetes puts you at higher risk for heart attack, stroke, kidney failure, blindness, and neuropathy.  Also discussed importance of yearly eye exams, yearly dental exams, and regular follow-up with PCP to check blood pressure, lipids, liver function, and kidney function. Foot care discussed on checking feet daily, wearing proper fitting shoes, and wearing shoes when out of bed. Handouts given with discussion on types of insulin, storage of insulin, disposal of needles, injection sites, rotation of sites, and how to use vials and syringes. Patient did return demonstration of injecting air into vial, pulling back recommended dose, and administering shot into the foam pad. Prescriptions started  in discharge orders for lancets, test strips, alcohol wipes, Lantus vial, Humalog vial, and insulin syringes.Patient has no further questions or concerns related to diabetes at this time.        Electronically signed by:  Elyssa Cavazos RN  08/24/20 14:13

## 2020-08-24 NOTE — PROGRESS NOTES
Discharge Planning Assessment  [facility]     Patient Name: Yadira Car  MRN: [id number]  Today's Date: 8/24/2020    Admit Date: 8/22/2020    Discharge Needs Assessment     Row Name 08/24/20 1231       Living Environment    Lives With  alone    Current Living Arrangements  home/apartment/condo    Primary Care Provided by  self    Provides Primary Care For  no one    Family Caregiver if Needed  parent(s);sibling(s)    Quality of Family Relationships  helpful    Able to Return to Prior Arrangements  yes       Resource/Environmental Concerns    Resource/Environmental Concerns  none    Transportation Concerns  car, none       Transition Planning    Patient/Family Anticipates Transition to  home    Patient/Family Anticipated Services at Transition  none    Transportation Anticipated  car, drives self       Discharge Needs Assessment    Readmission Within the Last 30 Days  no previous admission in last 30 days    Concerns to be Addressed  denies needs/concerns at this time    Equipment Currently Used at Home  none    Anticipated Changes Related to Illness  none    Equipment Needed After Discharge  none        Discharge Plan     Row Name 08/24/20 1232       Plan    Plan  D/C Plan: Anticipate routine home.     Patient/Family in Agreement with Plan  yes    Plan Comments   working off site due to COVID precautions. Called and spoke to patient in room. Patient IADLs and drives. Patient states she does not have a PCP, but is going to call Elizabeth Ramirez's office today to establish care. Pharmacy verified-denies any difficulty affording meds. Denies any d/c needs at this time. Barrier to D/C: pending diabetes educator consult for newly diagnosed DM Type 2, IV abx.           Expected Discharge Date and Time     Expected Discharge Date Expected Discharge Time    Aug 24, 2020         Demographic Summary     Row Name 08/24/20 1231       General Information    Admission Type  inpatient    Arrived From  emergency department     Referral Source  admission list    Reason for Consult  discharge planning    Preferred Language  English     Used During This Interaction  no        Functional Status     Row Name 08/24/20 1231       Functional Status    Usual Activity Tolerance  good    Current Activity Tolerance  good       Functional Status, IADL    Medications  independent    Meal Preparation  independent    Housekeeping  independent    Laundry  independent    Shopping  independent       Mental Status Summary    Recent Changes in Mental Status/Cognitive Functioning  no changes        Jania Velazquez

## 2020-08-24 NOTE — PLAN OF CARE
Problem: Patient Care Overview  Goal: Plan of Care Review  Outcome: Ongoing (interventions implemented as appropriate)  Flowsheets (Taken 8/24/2020 1440)  Outcome Summary: William being dc later today

## 2020-08-25 ENCOUNTER — TELEPHONE (OUTPATIENT)
Dept: DIABETES SERVICES | Facility: HOSPITAL | Age: 55
End: 2020-08-25

## 2020-08-25 LAB — C PEPTIDE SERPL-MCNC: 1.1 NG/ML (ref 1.1–4.4)

## 2020-08-25 NOTE — TELEPHONE ENCOUNTER
Educator received call from [facility] QA nurse, Debby. Nurse explained pt/pharmacy needing clarification regarding humalog dose (mealtime and sliding scale). Pharmacy ([place], [place]) received rx for humalog 8 units premeals and also for sliding scale 0-9 units but they did not receive specific instructions for the sliding scale.     Educator contacted pt. Pt states she has picked up rxs for Lantus 15 units am and Humalog 8 units premeals but was not given enough humalog to cover with sliding scale. Pt states she feels comfortable taking both the mealtime insulin and the sliding scale. Nursing staff had written out the specific moderate sliding scale (0-9 units) for patient prior to d/c. Pt wanting to continue using the scale. She states blood sugar is running high. This am bs was 265 at 9:30 am. She took Lantus dose. At 11:15 am she checked bs and it was 337. She took a total of 15 units of Humalog and ate breakfast.     Discussed this scale may be to much and cause her to have lows. Discussed if she begins having lows using the scale, she may want to change to more conservative scale: 1 unit for every 50 >150. Educator went through specific scale with pt. Pt verbalized her understanding.    Educator contacted pharmacy and revised humalog rx to Humalog 8 units 3 times/day plus sliding scale; max dosage 51 units/day. Reviewed specific moderate sliding scale with pharmacist. Pharmacist states this was already in the system because pt had given it to them. She had shown them her discharge instruction sheet. Explained to pharmacist pt would need additional vial of humalog to cover both the mealtime dose and sliding scale.    Pt with no additional questions/needs at this time. Gave pt educator phone number to call if has questions.

## 2020-08-27 ENCOUNTER — EPISODE CHANGES (OUTPATIENT)
Dept: CASE MANAGEMENT | Facility: OTHER | Age: 55
End: 2020-08-27

## 2020-08-27 ENCOUNTER — PATIENT OUTREACH (OUTPATIENT)
Dept: CASE MANAGEMENT | Facility: OTHER | Age: 55
End: 2020-08-27

## 2020-08-27 NOTE — OUTREACH NOTE
Care Coordination Assessment    Documented/Reviewed By:  Wes Manzanares RN Date/time:  8/27/2020  4:24 PM   Assessment completed with:  patient  Enrolled in care management program:  Yes  Living arrangement:  alone  Support system:  family  Type of residence:  private residence  Home care services:  No  Equipment used at home:  none  Communication device:  No  Bed or wheelchair confined:  No  Inadequate nutrition:  No  Medication adherence problem:  No  Experiencing side effects from current medications:  No  History of fall(s) in last 6 months:  No  Difficulty keeping appointments:  No  Family aware of the patient's advance care planning wishes:  No  Hinduism or spiritual beliefs that impact treatment:  No  Chronic pain:  No       Care Plan Note      Responses   Lifestyle Goals  Attend diabetes education, Lower blood sugar, Medication management, Self monitor blood sugar, Eat a healthy diet   Barriers  Unhealthy food, Disease education   Self Management  Attend a Patient Education Class, Home Glucose Monitoring   Suggested Appointments  -- [Has appt with endocrinology - Dr. Mena 9/14]   Annual Wellness Visit:   Patient Will Schedule [Patient made appt with new provider Dr. Jennifer Lee for 9/2]   Care Gap Comments  will discuss at next call    Specific Disease Process Teaching  Diabetes   Other Patient Education/Resources   24/7 [facility] Nurse Call Line   24/7 Nurse Call Line Education Method  Verbal   Does patient have depression diagnosis?  No   Advanced Directives:  Not Interested At This Time   Ed Visits past 12 months:  None   Hospitalizations past 12 months  1   Discharged From:  [facility]   Discharged to:  Home   Admit Date:  08/22/20   Discharge Date:  08/24/20   Discharge destination:  Home   Medication Adherence  Medications understood   Goal Progress  Making Progress Toward Goal(s)   Readiness Scale  7   Confidence Scale  7   How often do you have someone help you read hospital materials?   Never   How often do you have problems learning about your medical condition because of difficulty understanding written information?  Never   How often do you have a problem understanding what is told to you about your medical condition?  Never   How confident are you filling out medical forms by yourself?  Extremely   Health Literacy  Good        The main concerns and/or symptoms the patient would like to address are: f/u from PCP appt on 9/2, f/u from endo appt on 9/14, review mychart and ACP materials. a1c educ Diabetes education referral?    Education/instruction provided by Care Coordinator: spoke with pt from f/u in hospital. Reports she is managing well with new DM dx. States she has scheduled her PCP and endo appts. Is checking her blood sugars on a regular basis. Reports lowest has been 178. States they have been between 200-300, but is still getting used to her diet and medications. Compliant with meds and insulin. Takes lantus and humalog. Denies any questions or concerns regarding checking sugars or taking insulin. States the diabetic educator did a great job explaining things to pt. Pt lives by self, but has support of parents and siblings. 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   Discussed diabetes classes at [facility] and benefit of free classes - pt will talk with Dr. Mena and agreed ECM will reach out to educator to find out dates/times of classes. Pt also had questions regarding ins - ECM will send  name/number (Asha ZAVALA) Pt requested this info be send to her work email. Discussed s/s of low/high blood sugars - pt v/u. Also discussed if pt had food/drink at home to treat if sugars became low - pt named list of food/drink available for low blood sugars. Also carries food/drink with her in case of low sugars.  Pt was at work during call, limited about of time to talk.   Reviewed with patient 24/7 Nurse Line Telephone number; ECM contact information; My Chart; ECM  program. Patient verbalized understanding. No further questions or concerns voiced at this time.     Follow Up Outreach Due: 3 weeks, after endo appt - call between 10 am and 1pm    Wes Manzanares RN  Ambulatory     8/27/2020, 16:29

## 2020-09-01 LAB — GAD65 AB SER-ACNC: <5 U/ML (ref 0–5)

## 2020-09-03 ENCOUNTER — TRANSCRIBE ORDERS (OUTPATIENT)
Dept: ADMINISTRATIVE | Facility: HOSPITAL | Age: 55
End: 2020-09-03

## 2020-09-03 DIAGNOSIS — M54.50 CHRONIC LOW BACK PAIN, UNSPECIFIED BACK PAIN LATERALITY, UNSPECIFIED WHETHER SCIATICA PRESENT: Primary | ICD-10-CM

## 2020-09-03 DIAGNOSIS — G89.29 CHRONIC LOW BACK PAIN, UNSPECIFIED BACK PAIN LATERALITY, UNSPECIFIED WHETHER SCIATICA PRESENT: Primary | ICD-10-CM

## 2020-09-04 ENCOUNTER — HOSPITAL ENCOUNTER (OUTPATIENT)
Dept: GENERAL RADIOLOGY | Facility: HOSPITAL | Age: 55
Discharge: HOME | End: 2020-09-04
Admitting: FAMILY MEDICINE

## 2020-09-04 DIAGNOSIS — M54.50 CHRONIC LOW BACK PAIN, UNSPECIFIED BACK PAIN LATERALITY, UNSPECIFIED WHETHER SCIATICA PRESENT: ICD-10-CM

## 2020-09-04 DIAGNOSIS — G89.29 CHRONIC LOW BACK PAIN, UNSPECIFIED BACK PAIN LATERALITY, UNSPECIFIED WHETHER SCIATICA PRESENT: ICD-10-CM

## 2020-09-04 PROCEDURE — 72100 X-RAY EXAM L-S SPINE 2/3 VWS: CPT

## 2020-09-09 PROBLEM — B37.31 YEAST VAGINITIS: Status: ACTIVE | Noted: 2017-12-21

## 2020-09-14 ENCOUNTER — OFFICE VISIT (OUTPATIENT)
Dept: ENDOCRINOLOGY | Facility: CLINIC | Age: 55
End: 2020-09-14

## 2020-09-14 VITALS
DIASTOLIC BLOOD PRESSURE: 82 MMHG | HEART RATE: 75 BPM | HEIGHT: 68 IN | SYSTOLIC BLOOD PRESSURE: 126 MMHG | OXYGEN SATURATION: 98 % | TEMPERATURE: 97.7 F | WEIGHT: 193 LBS | BODY MASS INDEX: 29.25 KG/M2

## 2020-09-14 DIAGNOSIS — E11.9 DIABETES MELLITUS, NEW ONSET (HCC): Primary | ICD-10-CM

## 2020-09-14 LAB
GLUCOSE BLDC GLUCOMTR-MCNC: 212 MG/DL (ref 70–105)
GLUCOSE BLDC GLUCOMTR-MCNC: 212 MG/DL (ref 70–130)

## 2020-09-14 PROCEDURE — 82962 GLUCOSE BLOOD TEST: CPT | Performed by: INTERNAL MEDICINE

## 2020-09-14 PROCEDURE — 99213 OFFICE O/P EST LOW 20 MIN: CPT | Performed by: INTERNAL MEDICINE

## 2020-09-14 RX ORDER — INSULIN GLARGINE 100 [IU]/ML
INJECTION, SOLUTION SUBCUTANEOUS
Qty: 30 ML | Refills: 3 | Status: SHIPPED | OUTPATIENT
Start: 2020-09-14 | End: 2021-08-10

## 2020-09-14 RX ORDER — PEN NEEDLE, DIABETIC 32GX 5/32"
NEEDLE, DISPOSABLE MISCELLANEOUS
Qty: 100 EACH | Refills: 3 | Status: SHIPPED | OUTPATIENT
Start: 2020-09-14 | End: 2021-03-18

## 2020-09-14 NOTE — PATIENT INSTRUCTIONS
Attend diabetes education as scheduled.  See eye doctor.  Increase exercise as planned.  Decrease salt intake.  Drink plenty of water.  Increase Lantus insulin to 24 units daily. Take it @ supper. Prime before every shot.  Increase Humalog to 10 units before meals.  Start sliding scale @ 200.  F/u with Dr. Lee.

## 2020-09-14 NOTE — PROGRESS NOTES
[facility]        Patient Care Team:  Jennifer Lee MD as PCP - General (Family Medicine)  Wes Manzanares, RN as Ambulatory  ([facility])    Chief Complaint:    Chief Complaint   Patient presents with   • Diabetes     type 1         Subjective   Here for diabetes f/u  Seen while in hospital in August. Had ketosis & hyperglycemia, but not DKA  On basal / bolus insulin  Blood sugars: improving, in the low 200's now  Exercise program: walking at work. Works 2nd shift  Took metformin for pre diabetes a few years ago. Had GI side effects    Interval History:     Patient Complaints: blurred vision  Patient Denies:  hypoglycemia  History taken from: patient    Review of Systems:   Review of Systems   HENT: Negative for trouble swallowing.    Eyes: Positive for blurred vision.   Gastrointestinal: Positive for constipation. Negative for nausea.   Endocrine: Negative for polyuria.   Genitourinary:        Nocturia   Musculoskeletal:        Increased facial hair   Neurological: Negative for headache.     Gained 7 lb since hosp discharge 3 weeks ago    Objective     Vital Signs  Temp:  [97.7 °F (36.5 °C)] 97.7 °F (36.5 °C)  Heart Rate:  [75] 75  BP: (126)/(82) 126/82    Physical Exam:     General Appearance:    Alert, cooperative, in no acute distress   Head:    Normocephalic, without obvious abnormality, atraumatic   Eyes:            Lids and lashes normal, conjunctivae and sclerae normal, no   icterus, no pallor, corneas clear, PERRLA   Throat:   No oral lesions,  oral mucosa moist   Neck:   No adenopathy, supple,  no thyromegaly, no   carotid bruit   Lungs:     Clear     Heart:    Regular rhythm and normal rate   Chest Wall:    No abnormalities observed   Abdomen:     Normal bowel sounds, soft                 Extremities:   Moves all extremities well, trace edema               Pulses:   Pulses palpable and equal bilaterally   Skin:   Dry   Neurologic:  DTR absent in ankles, normal  "vibratory sense, able to feel the 10g monofilament          Results Review:    I have reviewed the patient's new clinical results, labs & imaging.    Medication Review:   Prior to Admission medications    Medication Sig Start Date End Date Taking? Authorizing Provider   Alcohol Swabs 70 % pads Use as directed 4 times day 8/24/20  Yes Jess Leija MD   Cobalamin Combinations (B12 FOLATE PO) Take  by mouth. b12 spray .. spray on tongue three times week   Yes Nissa Bellamy MD   diclofenac (Voltaren) 1 % gel gel Apply 4 g topically to the appropriate area as directed 4 (Four) Times a Day As Needed.   Yes Nissa Bellamy MD   Ginger, Zingiber officinalis, (GINGER ROOT PO) Take  by mouth. Take one twice weekly   Yes Nissa Bellamy MD   glucose blood (OneTouch Verio) test strip Test blood glucose 4 times per day (before meals and at bedtime) 8/24/20  Yes Jess Leija MD   insulin lispro (humaLOG) 100 UNIT/ML injection Inject 10 units ac tid, plus sliding scale. Max 40 units/d 9/14/20  Yes Nelda Mena MD   Insulin Syringe-Needle U-100 30G X 1/2\" 1 ML misc 1 each 4 (Four) Times a Day Before Meals & at Bedtime. 8/24/20  Yes Jess Leija MD   Isopropyl Alcohol (ALCOHOL WIPES) 70 % misc Apply 1 each topically 4 (Four) Times a Day Before Meals & at Bedtime. 8/24/20  Yes Jess Leija MD   Lancets (OneTouch Delica Plus [id number]) misc Test blood glucose 4 times daily (before meals and at bedtime) 8/24/20  Yes Jess Leija MD   Magnesium Oxide (Mag-200) 200 MG tablet Take 200 mg by mouth Daily.   Yes Nissa Bellamy MD   NON FORMULARY Sovereign silver  Take one three times weekly   Yes Nissa Bellamy MD   insulin glargine (LANTUS) 100 UNIT/ML injection Inject 20 Units under the skin into the appropriate area as directed Every Morning. 8/25/20 9/14/20 Yes Jess Leija MD   insulin lispro (humaLOG) 100 UNIT/ML injection Inject 8 Units under the skin " into the appropriate area as directed 3 (Three) Times a Day With Meals and sliding scale. Max 51 units daily 8/24/20 9/14/20 Yes Jess Leija MD   Insulin Glargine (LANTUS SOLOSTAR) 100 UNIT/ML injection pen Inject 24 units under the skin at dinner 9/14/20   Nelda Mena MD   Insulin Pen Needle (BD Pen Needle Marcela U/F) 32G X 4 MM misc Use with Lantus pen daily. 9/14/20   Nelda Mena MD   insulin lispro (humaLOG) 100 UNIT/ML injection Inject 0-9 Units under the skin into the appropriate area as directed As Needed for High Blood Sugar (Per the administration instructions). 8/24/20 9/14/20  Jess Leija MD   insulin lispro (humaLOG) 100 UNIT/ML injection Inject 0-9 Units under the skin into the appropriate area as directed 3 (Three) Times a Day Before Meals. 8/24/20 9/14/20  Jess Leija MD       Lab Results (most recent)     Procedure Component Value Units Date/Time    POC Glucose [[id number]]  (Abnormal) Collected: 09/14/20 0847    Specimen: Blood Updated: 09/14/20 0856     Glucose 212 mg/dL      Comment: Serial Number: [id number]:  [id number]       POC Glucose Fingerstick [[id number]]  (Abnormal) Collected: 09/14/20 0846    Specimen: Blood Updated: 09/14/20 0847     Glucose 212 mg/dL      Comment: ate@7a this morning, 1.5 hours ago, insulin@ 730a this morning               Lab Results   Component Value Date    HGBA1C 14.1 (H) 08/22/2020      Lab Results   Component Value Date    GLUCOSE 299 (H) 08/23/2020    BUN  08/23/2020      Comment:      Testing performed by alternate method    BUN 11 08/23/2020    CREATININE 0.56 (L) 08/23/2020    EGFRIFNONA 113 08/23/2020    EGFRIFAFRI >60 01/09/2015    BCR  08/23/2020      Comment:      Testing not performed    K 3.6 08/23/2020    CO2 18.0 (L) 08/23/2020    CALCIUM 7.9 (L) 08/23/2020    PROTENTOTREF 7.2 01/09/2015    ALBUMIN 4.10 08/22/2020    LABIL2 2.3 01/09/2015    AST 11 08/22/2020    ALT 11 08/22/2020    LDL  01/09/2015       Comment:      Unable to calculate due to elevated Triglycerides.                      LDL References Ranges  (U.S. Department of Health and Human Services ATP III Classifications)                Optimal                       <100 mg/dL                Near Optimal               100-129 mg/dL                Borderline High            130-159 mg/dL                High                             160-189 mg/dL                Very High                     >189 mg/dL      HDL 35 (L) 01/09/2015    TRIG 563 (H) 01/09/2015     C-peptide 1.1; MYRA 65 AB <5.0    Assessment/Plan     Yadira was seen today for diabetes.    Diagnoses and all orders for this visit:    Diabetes mellitus, new onset (CMS/HCC)  -     POC Glucose Fingerstick  -     Insulin Glargine (LANTUS SOLOSTAR) 100 UNIT/ML injection pen; Inject 24 units under the skin at dinner  -     insulin lispro (humaLOG) 100 UNIT/ML injection; Inject 10 units ac tid, plus sliding scale. Max 40 units/d  -     Insulin Pen Needle (BD Pen Needle Marcela U/F) 32G X 4 MM misc; Use with Lantus pen daily.    Other orders  -     POC Glucose    DM type 2, control improving. Has a PCP now, following closely.    Attend diabetes education as scheduled.  See eye doctor.  Increase exercise as planned.  Decrease salt intake.  Drink plenty of water.  Increase Lantus insulin to 24 units daily. Take it @ supper. Prime before every shot. Switched to pen.  Increase Humalog to 10 units before meals.  Start sliding scale @ 200.  F/u with Dr. Lee.          Nelda Mena MD  09/14/20  18:02 EDT

## 2020-09-18 ENCOUNTER — EPISODE CHANGES (OUTPATIENT)
Dept: CASE MANAGEMENT | Facility: OTHER | Age: 55
End: 2020-09-18

## 2020-09-18 ENCOUNTER — PATIENT OUTREACH (OUTPATIENT)
Dept: CASE MANAGEMENT | Facility: OTHER | Age: 55
End: 2020-09-18

## 2020-09-18 NOTE — OUTREACH NOTE
"Patient Outreach Note    Spoke to pt for f/u. Reports has been doing well. Is compliant with diet. States she is feeling so much better.  Has f/u with endo, didn't feel the provider listened to her when she asked questions about her health. Pt did have initial appt with PCP - Dr. Lee and states she has a great connection with her. Will have her manage her diabetes and this was approved by endo. Reviewed upcoming appt with diabetic educator on 11/5. Pt also excited as she is changing job locations from [employer] to [employer] - and this will be better hours with no weekends/holidays. Feels will help with mental and physical stress. Has orders for blood work (a1c, cmp, cbc) for 6 weeks and again in December. Next PCP visit 1/13 - telehealth visit. No endo f/u unless ordered by PCP. Reviewed labs and educated on  vitamin D - pt going to talk with PCP about adding this to her lab work. Pt reports dx with spinal stenosis - going to obtain MRI.  meds reviewed with pt. Reports checking blood sugars 4x.day. educated on obtaining free  glucometer via ins. (freestyle on formulary). Pt reports she was able to get 6 bottle of humalog thru [facility] retail pharmacy for $10. Has 2 vials of Lantus - was told by diabetic educator and Dr. Lee ok to use lantus until gone as long as refrigerated. Then pt will probably get the lantus pen. Going to find out which is less expensive. ECM encouraged pt to ask educator on 11/5 appt if samples or copay card for lantus pen. Reviewed flu shot - pt states she had rxn of diarrhea and localized rxn last year and declines flu shot. Shingles shot reviewed - pt declines getting shot due to not wanting to put \"things\" in her body. Educated on shingrix vaccine. Pt v/u. Pt reports colonoscopy on 2017 and repeat in 10 yrs. Reviewed mychart instructions.   ECM sent evidenced based Websites   [email or web address].diabetes.org/  https://zlien.[email or web address]      Next call: F/u in November after initial assessment at [facility], classes " scheduled? Mychart? Meds, blood sugars, websites,     Wes Manzanares RN  Ambulatory     9/18/2020, 16:46 EDT

## 2020-09-29 ENCOUNTER — TRANSCRIBE ORDERS (OUTPATIENT)
Dept: ADMINISTRATIVE | Facility: HOSPITAL | Age: 55
End: 2020-09-29

## 2020-09-29 DIAGNOSIS — M54.50 LOW BACK PAIN, UNSPECIFIED BACK PAIN LATERALITY, UNSPECIFIED CHRONICITY, UNSPECIFIED WHETHER SCIATICA PRESENT: Primary | ICD-10-CM

## 2020-10-04 ENCOUNTER — HOSPITAL ENCOUNTER (OUTPATIENT)
Dept: MRI IMAGING | Facility: HOSPITAL | Age: 55
Discharge: HOME | End: 2020-10-04
Admitting: FAMILY MEDICINE

## 2020-10-04 DIAGNOSIS — M54.50 LOW BACK PAIN, UNSPECIFIED BACK PAIN LATERALITY, UNSPECIFIED CHRONICITY, UNSPECIFIED WHETHER SCIATICA PRESENT: ICD-10-CM

## 2020-10-04 PROCEDURE — 72148 MRI LUMBAR SPINE W/O DYE: CPT

## 2020-11-05 ENCOUNTER — OFFICE VISIT (OUTPATIENT)
Dept: ENDOCRINOLOGY | Facility: CLINIC | Age: 55
End: 2020-11-05

## 2020-11-05 DIAGNOSIS — E11.9 DIABETES MELLITUS, NEW ONSET (HCC): ICD-10-CM

## 2020-11-07 ENCOUNTER — LAB (OUTPATIENT)
Dept: LAB | Facility: HOSPITAL | Age: 55
End: 2020-11-07

## 2020-11-07 ENCOUNTER — TRANSCRIBE ORDERS (OUTPATIENT)
Dept: ADMINISTRATIVE | Facility: HOSPITAL | Age: 55
End: 2020-11-07

## 2020-11-07 DIAGNOSIS — E78.81 LIPOID DERMATOARTHRITIS: ICD-10-CM

## 2020-11-07 DIAGNOSIS — E10.69 TYPE 1 DIABETES MELLITUS WITH OTHER SPECIFIED COMPLICATION (HCC): ICD-10-CM

## 2020-11-07 DIAGNOSIS — E78.81 LIPOID DERMATOARTHRITIS: Primary | ICD-10-CM

## 2020-11-07 LAB
ALBUMIN SERPL-MCNC: 4.4 G/DL (ref 3.5–5.2)
ALBUMIN/GLOB SERPL: 1.7 G/DL
ALP SERPL-CCNC: 88 U/L (ref 39–117)
ALT SERPL W P-5'-P-CCNC: 29 U/L (ref 1–33)
ANION GAP SERPL CALCULATED.3IONS-SCNC: 8.9 MMOL/L (ref 5–15)
AST SERPL-CCNC: 26 U/L (ref 1–32)
BILIRUB SERPL-MCNC: 0.4 MG/DL (ref 0–1.2)
BUN SERPL-MCNC: 18 MG/DL (ref 6–20)
BUN/CREAT SERPL: 25.7 (ref 7–25)
CALCIUM SPEC-SCNC: 9.3 MG/DL (ref 8.6–10.5)
CHLORIDE SERPL-SCNC: 106 MMOL/L (ref 98–107)
CHOLEST SERPL-MCNC: 127 MG/DL (ref 0–200)
CO2 SERPL-SCNC: 27.1 MMOL/L (ref 22–29)
CREAT SERPL-MCNC: 0.7 MG/DL (ref 0.57–1)
GFR SERPL CREATININE-BSD FRML MDRD: 87 ML/MIN/1.73
GLOBULIN UR ELPH-MCNC: 2.6 GM/DL
GLUCOSE SERPL-MCNC: 129 MG/DL (ref 65–99)
HDLC SERPL-MCNC: 56 MG/DL (ref 40–60)
LDLC SERPL CALC-MCNC: 53 MG/DL (ref 0–100)
LDLC/HDLC SERPL: 0.91 {RATIO}
POTASSIUM SERPL-SCNC: 5.1 MMOL/L (ref 3.5–5.2)
PROT SERPL-MCNC: 7 G/DL (ref 6–8.5)
SODIUM SERPL-SCNC: 142 MMOL/L (ref 136–145)
TRIGL SERPL-MCNC: 99 MG/DL (ref 0–150)
VLDLC SERPL-MCNC: 18 MG/DL (ref 5–40)

## 2020-11-07 PROCEDURE — 80053 COMPREHEN METABOLIC PANEL: CPT

## 2020-11-07 PROCEDURE — 80061 LIPID PANEL: CPT

## 2020-11-07 PROCEDURE — 36415 COLL VENOUS BLD VENIPUNCTURE: CPT

## 2020-12-11 ENCOUNTER — PATIENT OUTREACH (OUTPATIENT)
Dept: CASE MANAGEMENT | Facility: OTHER | Age: 55
End: 2020-12-11

## 2020-12-11 NOTE — OUTREACH NOTE
Patient Outreach Note    ECM had called pt for f/u. Pt reached ot to ECM via text message. Reports she is doing well. Enjoying her new job. Discussed [employer] enrollment, pt has glucometer and scale. Would like the BP cuff, but doesn't qualify. Average blood sugar 133. Doing well with diet.   No barriers to transportation food or medications.   Agreed to f/u 1-2 months    Wes Manzanares RN  Ambulatory     12/11/2020, 12:51 EST

## 2020-12-16 ENCOUNTER — LAB (OUTPATIENT)
Dept: LAB | Facility: HOSPITAL | Age: 55
End: 2020-12-16

## 2020-12-16 ENCOUNTER — TRANSCRIBE ORDERS (OUTPATIENT)
Dept: ADMINISTRATIVE | Facility: HOSPITAL | Age: 55
End: 2020-12-16

## 2020-12-16 DIAGNOSIS — E10.69 TYPE 1 DIABETES MELLITUS WITH OTHER SPECIFIED COMPLICATION (HCC): ICD-10-CM

## 2020-12-16 DIAGNOSIS — E10.69 TYPE 1 DIABETES MELLITUS WITH OTHER SPECIFIED COMPLICATION (HCC): Primary | ICD-10-CM

## 2020-12-16 LAB
ALBUMIN SERPL-MCNC: 4.4 G/DL (ref 3.5–5.2)
ALBUMIN/GLOB SERPL: 1.4 G/DL
ALP SERPL-CCNC: 88 U/L (ref 39–117)
ALT SERPL W P-5'-P-CCNC: 28 U/L (ref 1–33)
ANION GAP SERPL CALCULATED.3IONS-SCNC: 12.9 MMOL/L (ref 5–15)
AST SERPL-CCNC: 25 U/L (ref 1–32)
BILIRUB SERPL-MCNC: 0.3 MG/DL (ref 0–1.2)
BUN SERPL-MCNC: 21 MG/DL (ref 6–20)
BUN/CREAT SERPL: 25.6 (ref 7–25)
CALCIUM SPEC-SCNC: 9.9 MG/DL (ref 8.6–10.5)
CHLORIDE SERPL-SCNC: 105 MMOL/L (ref 98–107)
CO2 SERPL-SCNC: 24.1 MMOL/L (ref 22–29)
CREAT SERPL-MCNC: 0.82 MG/DL (ref 0.57–1)
GFR SERPL CREATININE-BSD FRML MDRD: 73 ML/MIN/1.73
GLOBULIN UR ELPH-MCNC: 3.1 GM/DL
GLUCOSE SERPL-MCNC: 127 MG/DL (ref 65–99)
HBA1C MFR BLD: 6.95 % (ref 4.8–5.6)
POTASSIUM SERPL-SCNC: 4.8 MMOL/L (ref 3.5–5.2)
PROT SERPL-MCNC: 7.5 G/DL (ref 6–8.5)
SODIUM SERPL-SCNC: 142 MMOL/L (ref 136–145)

## 2020-12-16 PROCEDURE — 80053 COMPREHEN METABOLIC PANEL: CPT

## 2020-12-16 PROCEDURE — 36415 COLL VENOUS BLD VENIPUNCTURE: CPT

## 2020-12-16 PROCEDURE — 83036 HEMOGLOBIN GLYCOSYLATED A1C: CPT

## 2021-01-14 ENCOUNTER — LAB (OUTPATIENT)
Dept: LAB | Facility: HOSPITAL | Age: 56
End: 2021-01-14

## 2021-01-14 ENCOUNTER — TRANSCRIBE ORDERS (OUTPATIENT)
Dept: ADMINISTRATIVE | Facility: HOSPITAL | Age: 56
End: 2021-01-14

## 2021-01-14 DIAGNOSIS — N39.9 DISEASE OF URINARY TRACT: Primary | ICD-10-CM

## 2021-01-14 DIAGNOSIS — N39.9 DISEASE OF URINARY TRACT: ICD-10-CM

## 2021-01-14 LAB
BACTERIA UR QL AUTO: ABNORMAL /HPF
BILIRUB UR QL STRIP: NEGATIVE
CLARITY UR: CLEAR
COLOR UR: YELLOW
GLUCOSE UR STRIP-MCNC: NEGATIVE MG/DL
HGB UR QL STRIP.AUTO: NEGATIVE
HYALINE CASTS UR QL AUTO: ABNORMAL /LPF
KETONES UR QL STRIP: NEGATIVE
LEUKOCYTE ESTERASE UR QL STRIP.AUTO: ABNORMAL
NITRITE UR QL STRIP: NEGATIVE
PH UR STRIP.AUTO: 5.5 [PH] (ref 5–8)
PROT UR QL STRIP: NEGATIVE
RBC # UR: ABNORMAL /HPF
REF LAB TEST METHOD: ABNORMAL
SP GR UR STRIP: 1.02 (ref 1–1.03)
SQUAMOUS #/AREA URNS HPF: ABNORMAL /HPF
UROBILINOGEN UR QL STRIP: ABNORMAL
WBC UR QL AUTO: ABNORMAL /HPF

## 2021-01-14 PROCEDURE — 81001 URINALYSIS AUTO W/SCOPE: CPT

## 2021-01-14 PROCEDURE — 87086 URINE CULTURE/COLONY COUNT: CPT

## 2021-01-15 ENCOUNTER — TRANSCRIBE ORDERS (OUTPATIENT)
Dept: LAB | Facility: HOSPITAL | Age: 56
End: 2021-01-15

## 2021-01-15 ENCOUNTER — LAB (OUTPATIENT)
Dept: LAB | Facility: HOSPITAL | Age: 56
End: 2021-01-15

## 2021-01-15 DIAGNOSIS — N39.0 URINARY TRACT INFECTION WITHOUT HEMATURIA, SITE UNSPECIFIED: Primary | ICD-10-CM

## 2021-01-15 DIAGNOSIS — N39.0 URINARY TRACT INFECTION WITHOUT HEMATURIA, SITE UNSPECIFIED: ICD-10-CM

## 2021-01-16 LAB — BACTERIA SPEC AEROBE CULT: NO GROWTH

## 2021-02-23 ENCOUNTER — TRANSCRIBE ORDERS (OUTPATIENT)
Dept: ADMINISTRATIVE | Facility: HOSPITAL | Age: 56
End: 2021-02-23

## 2021-02-23 ENCOUNTER — LAB (OUTPATIENT)
Dept: LAB | Facility: HOSPITAL | Age: 56
End: 2021-02-23

## 2021-02-23 DIAGNOSIS — M54.50 LOW BACK PAIN WITHOUT SCIATICA, UNSPECIFIED BACK PAIN LATERALITY, UNSPECIFIED CHRONICITY: Primary | ICD-10-CM

## 2021-02-23 DIAGNOSIS — N39.9 URINARY TRACT DISORDER: ICD-10-CM

## 2021-02-23 LAB
BACTERIA UR QL AUTO: ABNORMAL /HPF
BILIRUB UR QL STRIP: NEGATIVE
CLARITY UR: ABNORMAL
COLOR UR: YELLOW
GLUCOSE UR STRIP-MCNC: NEGATIVE MG/DL
HGB UR QL STRIP.AUTO: NEGATIVE
HYALINE CASTS UR QL AUTO: ABNORMAL /LPF
KETONES UR QL STRIP: NEGATIVE
LEUKOCYTE ESTERASE UR QL STRIP.AUTO: ABNORMAL
NITRITE UR QL STRIP: NEGATIVE
PH UR STRIP.AUTO: 5.5 [PH] (ref 5–8)
PROT UR QL STRIP: NEGATIVE
RBC # UR: ABNORMAL /HPF
REF LAB TEST METHOD: ABNORMAL
SP GR UR STRIP: 1.02 (ref 1–1.03)
SQUAMOUS #/AREA URNS HPF: ABNORMAL /HPF
UROBILINOGEN UR QL STRIP: ABNORMAL
WBC UR QL AUTO: ABNORMAL /HPF

## 2021-02-23 PROCEDURE — 87086 URINE CULTURE/COLONY COUNT: CPT | Performed by: FAMILY MEDICINE

## 2021-02-23 PROCEDURE — 81001 URINALYSIS AUTO W/SCOPE: CPT | Performed by: FAMILY MEDICINE

## 2021-02-24 LAB — BACTERIA SPEC AEROBE CULT: NORMAL

## 2021-03-18 ENCOUNTER — OFFICE VISIT (OUTPATIENT)
Dept: CARDIOLOGY | Facility: CLINIC | Age: 56
End: 2021-03-18

## 2021-03-18 VITALS
HEART RATE: 81 BPM | SYSTOLIC BLOOD PRESSURE: 132 MMHG | BODY MASS INDEX: 33.95 KG/M2 | WEIGHT: 224 LBS | HEIGHT: 68 IN | DIASTOLIC BLOOD PRESSURE: 80 MMHG

## 2021-03-18 DIAGNOSIS — E78.5 HYPERLIPIDEMIA, UNSPECIFIED HYPERLIPIDEMIA TYPE: ICD-10-CM

## 2021-03-18 DIAGNOSIS — R06.09 DYSPNEA ON EXERTION: ICD-10-CM

## 2021-03-18 DIAGNOSIS — R01.1 MURMUR: Primary | ICD-10-CM

## 2021-03-18 DIAGNOSIS — E11.9 DIABETES MELLITUS, NEW ONSET (HCC): ICD-10-CM

## 2021-03-18 PROCEDURE — 93000 ELECTROCARDIOGRAM COMPLETE: CPT | Performed by: INTERNAL MEDICINE

## 2021-03-18 PROCEDURE — 99204 OFFICE O/P NEW MOD 45 MIN: CPT | Performed by: INTERNAL MEDICINE

## 2021-03-18 NOTE — PROGRESS NOTES
Subjective:     Encounter Date:03/18/2021      Patient ID: Yadira Car is a 55 y.o. female.    Chief Complaint:  History of Present Illness    This is a 55-year-old with a history of type 1 diabetes, obesity, hyperlipidemia, who presents for an evaluation of a murmur.    Patient reports that she was diagnosed with type 1 diabetes in 8/2020 during a hospitalization after presenting with complaints of increased thirst, polyuria and hyperglycemia.  She has been followed closely by Dr. Lee since that time.  Dr. Lee noted a murmur on exam at her initial evaluation and in follow-up.  It was recommended that she undergo further cardiac work-up.    Patient reports that since starting insulin she has gained significant amount of weight.  She also reports that her father committed suicide in 8/2020.  He had a history of TIAs and stroke.  She reports that her mother had a two-vessel CABG at the age of 48 and recently underwent stent placement.  The patient otherwise denies any personal cardiac history.  She does report dyspnea on exertion but attributes this to her significant weight gain.  She denies any chest pain, palpitations, orthopnea, near-syncope or syncope.  She has intermittent lower extremity edema that occurs primarily after being seated for long period of time.  She warrants inpatient access here at the hospital and reports that this involves a lot of sitting.    Review of Systems   Constitutional: Positive for weight gain. Negative for malaise/fatigue.   HENT: Negative for hearing loss, hoarse voice, nosebleeds and sore throat.    Eyes: Negative for pain.   Cardiovascular: Positive for dyspnea on exertion and leg swelling. Negative for chest pain, claudication, cyanosis, irregular heartbeat, near-syncope, orthopnea, palpitations, paroxysmal nocturnal dyspnea and syncope.   Respiratory: Negative for shortness of breath and snoring.    Endocrine: Negative for cold intolerance, heat intolerance,  "polydipsia, polyphagia and polyuria.   Skin: Negative for itching and rash.   Musculoskeletal: Negative for arthritis, falls, joint pain, joint swelling, muscle cramps, muscle weakness and myalgias.   Gastrointestinal: Negative for constipation, diarrhea, dysphagia, heartburn, hematemesis, hematochezia, melena, nausea and vomiting.   Genitourinary: Negative for frequency, hematuria and hesitancy.   Neurological: Negative for excessive daytime sleepiness, dizziness, headaches, light-headedness, numbness and weakness.   Psychiatric/Behavioral: Negative for depression. The patient is not nervous/anxious.          Current Outpatient Medications:   •  Alcohol Swabs 70 % pads, Use as directed 4 times day, Disp: 100 each, Rfl: 1  •  Cobalamin Combinations (B12 FOLATE PO), Take  by mouth. b12 spray .. spray on tongue three times week, Disp: , Rfl:   •  Diclofenac Sodium (VOLTAREN) 1 % gel gel, Apply topically to the appropriate area as directed 4 (Four) Times a Day., Disp: 100 g, Rfl: 1  •  Ginger, Zingiber officinalis, (GINGER ROOT PO), Take  by mouth. Take one twice weekly, Disp: , Rfl:   •  HYDROcodone-acetaminophen (Norco) 5-325 MG per tablet, Take 1 tablet by mouth Every 6 (Six) Hours As Needed for pain, Disp: 10 tablet, Rfl: 0  •  insulin detemir (Levemir) 100 UNIT/ML injection, Inject 30 Units under the skin into the appropriate area as directed Daily., Disp: 30 mL, Rfl: 1  •  insulin lispro (humaLOG) 100 UNIT/ML injection, Inject 10 units ac tid, plus sliding scale. Max 40 units/d, Disp: 100 mL, Rfl: 28  •  Insulin Syringe-Needle U-100 (BD Veo Insulin Syringe U/F) 31G X 15/64\" 1 ML misc, Inject 1 Unit as directed 4 X DAILY 4 times per Day for 90 Days, Disp: 360 each, Rfl: 0  •  Lancets (OneTouch Delica Plus [id number]) misc, Test blood glucose 4 times daily (before meals and at bedtime), Disp: 100 each, Rfl: 1  •  NON FORMULARY, Sovereign silver  Take one three times weekly, Disp: , Rfl:   •  OneTouch Verio test " "strip, Take 1 Unit as directed TO TEST BLOOD SUGAR 4 times per Day for 100 Days E11.9  DM II, Disp: 100 each, Rfl: 3  •  rosuvastatin (CRESTOR) 10 MG tablet, Take 1 Tablet by mouth once per Day for 90 Days, Disp: 90 tablet, Rfl: 1  •  insulin glargine (LANTUS, SEMGLEE) 100 UNIT/ML injection, Inject 24 units under the skin at dinner, Disp: 30 mL, Rfl: 3    Past Medical History:   Diagnosis Date   • Allergic    • Diabetes mellitus (CMS/HCC) 08/2020    New onset   • Diabetes mellitus type I (CMS/HCC)    • Dyslipidemia    • Low back pain    • Renal calculi     from calcium   • Spinal stenosis    • Systolic murmur        Past Surgical History:   Procedure Laterality Date   • D & C AND LAPAROSCOPY     • ECTOPIC PREGNANCY     • WISDOM TOOTH EXTRACTION         Family History   Problem Relation Age of Onset   • Heart disease Mother    • Diabetes Mother    • Arthritis Mother    • Hypertension Father    • Diabetes Father    • Stroke Father    • Hyperlipidemia Father    • No Known Problems Brother    • Heart disease Maternal Uncle    • Diabetes Maternal Grandmother    • Diabetes Maternal Grandfather    • Heart disease Maternal Grandfather        Social History     Tobacco Use   • Smoking status: Former Smoker     Packs/day: 0.50     Years: 35.00     Pack years: 17.50   • Smokeless tobacco: Never Used   • Tobacco comment: On and Off   Substance Use Topics   • Alcohol use: Yes     Comment: once-twice/yr.--caffeine use   • Drug use: No         ECG 12 Lead    Date/Time: 3/18/2021 9:51 AM  Performed by: Ally Thompson MD  Authorized by: Ally Thompson MD   Comparison: compared with previous ECG   Similar to previous ECG  Rhythm: sinus rhythm               Objective:     Visit Vitals  /80 (BP Location: Right arm, Patient Position: Sitting, Cuff Size: Large Adult)   Pulse 81   Ht 172.7 cm (68\")   Wt 102 kg (224 lb)   BMI 34.06 kg/m²         Constitutional:       Appearance: Normal appearance. Well-developed.   Eyes:      " General: Lids are normal.      Conjunctiva/sclera: Conjunctivae normal.      Pupils: Pupils are equal, round, and reactive to light.   HENT:      Head: Normocephalic and atraumatic.   Neck:      Vascular: No carotid bruit or JVD.      Lymphadenopathy: No cervical adenopathy.   Pulmonary:      Effort: Pulmonary effort is normal.      Breath sounds: Normal breath sounds.   Cardiovascular:      Normal rate. Regular rhythm.      No gallop.   Pulses:     Radial: 2+ bilaterally.  Edema:     Peripheral edema absent.   Abdominal:      Palpations: Abdomen is soft.   Musculoskeletal:      Cervical back: Full passive range of motion without pain, normal range of motion and neck supple. Skin:     General: Skin is warm and dry.   Neurological:      Mental Status: Alert and oriented to person, place, and time.             Assessment:          Diagnosis Plan   1. Murmur  Adult Transthoracic Echo Complete w/ Color, Spectral and Contrast if Necessary Per Protocol   2. Hyperlipidemia, unspecified hyperlipidemia type     3. Diabetes mellitus, new onset (CMS/HCC)     4. Dyspnea on exertion  Adult Transthoracic Echo Complete w/ Color, Spectral and Contrast if Necessary Per Protocol          Plan:       1.  Murmur.  Soft systolic murmur on exam.  We will proceed with an echocardiogram.  2.  Dyspnea on exertion.  Suspect this may be due to deconditioning and weight gain over the last several months.  However with her history of diabetes and family history of premature coronary artery disease we did discuss pursuing an ischemic work-up with a stress test.  I explained to the patient that at this time our requirement is for patients to undergo Covid testing followed by a period of quarantine for 2 days prior to proceeding with stress test.  Patient reports that she would be unable to take off from work at this time.  We decided we would start with the echocardiogram and consider a stress test at a later date if her symptoms continue or  worsen.  3.  Type 1 diabetes.  Now under good control with a hemoglobin A1c of less than 7.  4.  Hyperlipidemia.  On rosuvastatin with lipids at goal.    We will call and discuss results of her echocardiogram and determine further recommendations based on those results.

## 2021-03-22 ENCOUNTER — HOSPITAL ENCOUNTER (OUTPATIENT)
Dept: CARDIOLOGY | Facility: HOSPITAL | Age: 56
Discharge: HOME | End: 2021-03-22
Admitting: INTERNAL MEDICINE

## 2021-03-22 PROCEDURE — 93306 TTE W/DOPPLER COMPLETE: CPT

## 2021-03-22 PROCEDURE — 93306 TTE W/DOPPLER COMPLETE: CPT | Performed by: INTERNAL MEDICINE

## 2021-03-23 LAB
ASCENDING AORTA: 2.9 CM
BH CV ECHO MEAS - ACS: 1.6 CM
BH CV ECHO MEAS - AO MAX PG (FULL): 10.5 MMHG
BH CV ECHO MEAS - AO MAX PG: 17 MMHG
BH CV ECHO MEAS - AO MEAN PG (FULL): 6.2 MMHG
BH CV ECHO MEAS - AO MEAN PG: 10 MMHG
BH CV ECHO MEAS - AO ROOT AREA (BSA CORRECTED): 1.2
BH CV ECHO MEAS - AO ROOT AREA: 4.8 CM^2
BH CV ECHO MEAS - AO ROOT DIAM: 2.5 CM
BH CV ECHO MEAS - AO V2 MAX: 203.6 CM/SEC
BH CV ECHO MEAS - AO V2 MEAN: 147.9 CM/SEC
BH CV ECHO MEAS - AO V2 VTI: 47.3 CM
BH CV ECHO MEAS - ASC AORTA: 2.9 CM
BH CV ECHO MEAS - AVA(I,A): 1.5 CM^2
BH CV ECHO MEAS - AVA(I,D): 1.5 CM^2
BH CV ECHO MEAS - AVA(V,A): 1.6 CM^2
BH CV ECHO MEAS - AVA(V,D): 1.6 CM^2
BH CV ECHO MEAS - BSA(HAYCOCK): 2.2 M^2
BH CV ECHO MEAS - BSA: 2.1 M^2
BH CV ECHO MEAS - BZI_BMI: 34.1 KILOGRAMS/M^2
BH CV ECHO MEAS - BZI_METRIC_HEIGHT: 172.7 CM
BH CV ECHO MEAS - BZI_METRIC_WEIGHT: 101.6 KG
BH CV ECHO MEAS - EDV(MOD-SP2): 55 ML
BH CV ECHO MEAS - EDV(MOD-SP4): 67 ML
BH CV ECHO MEAS - EDV(TEICH): 86.6 ML
BH CV ECHO MEAS - EF(CUBED): 61.1 %
BH CV ECHO MEAS - EF(MOD-BP): 61 %
BH CV ECHO MEAS - EF(MOD-SP2): 61.8 %
BH CV ECHO MEAS - EF(MOD-SP4): 64.2 %
BH CV ECHO MEAS - EF(TEICH): 52.9 %
BH CV ECHO MEAS - ESV(MOD-SP2): 21 ML
BH CV ECHO MEAS - ESV(MOD-SP4): 24 ML
BH CV ECHO MEAS - ESV(TEICH): 40.8 ML
BH CV ECHO MEAS - FS: 27 %
BH CV ECHO MEAS - IVS/LVPW: 1
BH CV ECHO MEAS - IVSD: 1.1 CM
BH CV ECHO MEAS - LAT PEAK E' VEL: 7.7 CM/SEC
BH CV ECHO MEAS - LV DIASTOLIC VOL/BSA (35-75): 31.2 ML/M^2
BH CV ECHO MEAS - LV MASS(C)D: 171.9 GRAMS
BH CV ECHO MEAS - LV MASS(C)DI: 80.1 GRAMS/M^2
BH CV ECHO MEAS - LV MAX PG: 6 MMHG
BH CV ECHO MEAS - LV MEAN PG: 3.4 MMHG
BH CV ECHO MEAS - LV SYSTOLIC VOL/BSA (12-30): 11.2 ML/M^2
BH CV ECHO MEAS - LV V1 MAX: 123 CM/SEC
BH CV ECHO MEAS - LV V1 MEAN: 89.1 CM/SEC
BH CV ECHO MEAS - LV V1 VTI: 27.1 CM
BH CV ECHO MEAS - LVIDD: 4.4 CM
BH CV ECHO MEAS - LVIDS: 3.2 CM
BH CV ECHO MEAS - LVLD AP2: 7.1 CM
BH CV ECHO MEAS - LVLD AP4: 6.9 CM
BH CV ECHO MEAS - LVLS AP2: 5.2 CM
BH CV ECHO MEAS - LVLS AP4: 5.7 CM
BH CV ECHO MEAS - LVOT AREA (M): 2.5 CM^2
BH CV ECHO MEAS - LVOT AREA: 2.6 CM^2
BH CV ECHO MEAS - LVOT DIAM: 1.8 CM
BH CV ECHO MEAS - LVPWD: 1.1 CM
BH CV ECHO MEAS - MED PEAK E' VEL: 9.4 CM/SEC
BH CV ECHO MEAS - MR MAX PG: 83 MMHG
BH CV ECHO MEAS - MR MAX VEL: 455.5 CM/SEC
BH CV ECHO MEAS - MV A DUR: 0.13 SEC
BH CV ECHO MEAS - MV A MAX VEL: 118.7 CM/SEC
BH CV ECHO MEAS - MV DEC SLOPE: 603.8 CM/SEC^2
BH CV ECHO MEAS - MV DEC TIME: 0.25 SEC
BH CV ECHO MEAS - MV E MAX VEL: 112.7 CM/SEC
BH CV ECHO MEAS - MV E/A: 0.95
BH CV ECHO MEAS - MV MAX PG: 6.6 MMHG
BH CV ECHO MEAS - MV MEAN PG: 4.2 MMHG
BH CV ECHO MEAS - MV P1/2T MAX VEL: 119.1 CM/SEC
BH CV ECHO MEAS - MV P1/2T: 57.8 MSEC
BH CV ECHO MEAS - MV V2 MAX: 128.2 CM/SEC
BH CV ECHO MEAS - MV V2 MEAN: 98.6 CM/SEC
BH CV ECHO MEAS - MV V2 VTI: 30.5 CM
BH CV ECHO MEAS - MVA P1/2T LCG: 1.8 CM^2
BH CV ECHO MEAS - MVA(P1/2T): 3.8 CM^2
BH CV ECHO MEAS - MVA(VTI): 2.3 CM^2
BH CV ECHO MEAS - PA ACC TIME: 0.12 SEC
BH CV ECHO MEAS - PA MAX PG (FULL): 2.3 MMHG
BH CV ECHO MEAS - PA MAX PG: 4.7 MMHG
BH CV ECHO MEAS - PA PR(ACCEL): 24.3 MMHG
BH CV ECHO MEAS - PA V2 MAX: 108.6 CM/SEC
BH CV ECHO MEAS - PULM A REVS DUR: 0.12 SEC
BH CV ECHO MEAS - PULM A REVS VEL: 33.4 CM/SEC
BH CV ECHO MEAS - PULM DIAS VEL: 64.7 CM/SEC
BH CV ECHO MEAS - PULM S/D: 1.1
BH CV ECHO MEAS - PULM SYS VEL: 72.8 CM/SEC
BH CV ECHO MEAS - PVA(V,A): 2.3 CM^2
BH CV ECHO MEAS - PVA(V,D): 2.3 CM^2
BH CV ECHO MEAS - QP/QS: 0.69
BH CV ECHO MEAS - RAP SYSTOLE: 3 MMHG
BH CV ECHO MEAS - RV MAX PG: 2.4 MMHG
BH CV ECHO MEAS - RV MEAN PG: 1.5 MMHG
BH CV ECHO MEAS - RV V1 MAX: 77.1 CM/SEC
BH CV ECHO MEAS - RV V1 MEAN: 60.1 CM/SEC
BH CV ECHO MEAS - RV V1 VTI: 14.9 CM
BH CV ECHO MEAS - RVOT AREA: 3.2 CM^2
BH CV ECHO MEAS - RVOT DIAM: 2 CM
BH CV ECHO MEAS - RVSP: 30 MMHG
BH CV ECHO MEAS - SI(AO): 105.9 ML/M^2
BH CV ECHO MEAS - SI(CUBED): 23.9 ML/M^2
BH CV ECHO MEAS - SI(LVOT): 32.7 ML/M^2
BH CV ECHO MEAS - SI(MOD-SP2): 15.9 ML/M^2
BH CV ECHO MEAS - SI(MOD-SP4): 20 ML/M^2
BH CV ECHO MEAS - SI(TEICH): 21.4 ML/M^2
BH CV ECHO MEAS - SUP REN AO DIAM: 2.1 CM
BH CV ECHO MEAS - SV(AO): 227.2 ML
BH CV ECHO MEAS - SV(CUBED): 51.3 ML
BH CV ECHO MEAS - SV(LVOT): 70.2 ML
BH CV ECHO MEAS - SV(MOD-SP2): 34 ML
BH CV ECHO MEAS - SV(MOD-SP4): 43 ML
BH CV ECHO MEAS - SV(RVOT): 48.4 ML
BH CV ECHO MEAS - SV(TEICH): 45.9 ML
BH CV ECHO MEAS - TAPSE (>1.6): 2.5 CM
BH CV ECHO MEAS - TR MAX VEL: 260.6 CM/SEC
BH CV ECHO MEASUREMENTS AVERAGE E/E' RATIO: 13.18
BH CV XLRA - RV BASE: 4 CM
BH CV XLRA - RV LENGTH: 5.4 CM
BH CV XLRA - RV MID: 2.8 CM
BH CV XLRA - TDI S': 14.6 CM/SEC
LEFT ATRIUM VOLUME INDEX: 16 ML/M2
MAXIMAL PREDICTED HEART RATE: 166 BPM
SINUS: 2.8 CM
STJ: 2.4 CM
STRESS TARGET HR: 141 BPM

## 2021-03-23 NOTE — PROGRESS NOTES
Called and discussed echocardiogram results with the patient.  She reports that she was able to walk the bridge this weekend without any significant dyspnea issues.  I recommended we just monitor her symptoms for now before pursuing any further work-up or consider the initiation of any diuretics.

## 2021-05-14 ENCOUNTER — TRANSCRIBE ORDERS (OUTPATIENT)
Dept: ADMINISTRATIVE | Facility: HOSPITAL | Age: 56
End: 2021-05-14

## 2021-05-14 DIAGNOSIS — E78.81 LIPOID DERMATOARTHRITIS: ICD-10-CM

## 2021-05-14 DIAGNOSIS — E10.9 INSULIN DEPENDENT DIABETES MELLITUS TYPE IA (HCC): Primary | ICD-10-CM

## 2021-05-15 ENCOUNTER — LAB (OUTPATIENT)
Dept: LAB | Facility: HOSPITAL | Age: 56
End: 2021-05-15

## 2021-05-15 DIAGNOSIS — E10.9 INSULIN DEPENDENT DIABETES MELLITUS TYPE IA (HCC): ICD-10-CM

## 2021-05-15 DIAGNOSIS — E78.81 LIPOID DERMATOARTHRITIS: ICD-10-CM

## 2021-05-15 LAB
25(OH)D3 SERPL-MCNC: 24 NG/ML (ref 30–100)
ALBUMIN SERPL-MCNC: 4.4 G/DL (ref 3.5–5.2)
ALBUMIN UR-MCNC: 6.1 MG/DL
ALBUMIN/GLOB SERPL: 1.8 G/DL
ALP SERPL-CCNC: 81 U/L (ref 39–117)
ALT SERPL W P-5'-P-CCNC: 22 U/L (ref 1–33)
ANION GAP SERPL CALCULATED.3IONS-SCNC: 8.6 MMOL/L (ref 5–15)
AST SERPL-CCNC: 15 U/L (ref 1–32)
BASOPHILS # BLD AUTO: 0.04 10*3/MM3 (ref 0–0.2)
BASOPHILS NFR BLD AUTO: 0.5 % (ref 0–1.5)
BILIRUB SERPL-MCNC: 0.3 MG/DL (ref 0–1.2)
BUN SERPL-MCNC: 17 MG/DL (ref 6–20)
BUN/CREAT SERPL: 23.3 (ref 7–25)
CALCIUM SPEC-SCNC: 9.4 MG/DL (ref 8.6–10.5)
CHLORIDE SERPL-SCNC: 107 MMOL/L (ref 98–107)
CHOLEST SERPL-MCNC: 167 MG/DL (ref 0–200)
CO2 SERPL-SCNC: 28.4 MMOL/L (ref 22–29)
CREAT SERPL-MCNC: 0.73 MG/DL (ref 0.57–1)
DEPRECATED RDW RBC AUTO: 43.3 FL (ref 37–54)
EOSINOPHIL # BLD AUTO: 0.49 10*3/MM3 (ref 0–0.4)
EOSINOPHIL NFR BLD AUTO: 5.7 % (ref 0.3–6.2)
ERYTHROCYTE [DISTWIDTH] IN BLOOD BY AUTOMATED COUNT: 13.1 % (ref 12.3–15.4)
GFR SERPL CREATININE-BSD FRML MDRD: 83 ML/MIN/1.73
GLOBULIN UR ELPH-MCNC: 2.5 GM/DL
GLUCOSE SERPL-MCNC: 155 MG/DL (ref 65–99)
HCT VFR BLD AUTO: 42.7 % (ref 34–46.6)
HDLC SERPL-MCNC: 52 MG/DL (ref 40–60)
HGB BLD-MCNC: 14 G/DL (ref 12–15.9)
IMM GRANULOCYTES # BLD AUTO: 0.03 10*3/MM3 (ref 0–0.05)
IMM GRANULOCYTES NFR BLD AUTO: 0.4 % (ref 0–0.5)
LDLC SERPL CALC-MCNC: 86 MG/DL (ref 0–100)
LDLC/HDLC SERPL: 1.55 {RATIO}
LYMPHOCYTES # BLD AUTO: 2.39 10*3/MM3 (ref 0.7–3.1)
LYMPHOCYTES NFR BLD AUTO: 27.9 % (ref 19.6–45.3)
MCH RBC QN AUTO: 30.1 PG (ref 26.6–33)
MCHC RBC AUTO-ENTMCNC: 32.8 G/DL (ref 31.5–35.7)
MCV RBC AUTO: 91.8 FL (ref 79–97)
MONOCYTES # BLD AUTO: 0.66 10*3/MM3 (ref 0.1–0.9)
MONOCYTES NFR BLD AUTO: 7.7 % (ref 5–12)
NEUTROPHILS NFR BLD AUTO: 4.96 10*3/MM3 (ref 1.7–7)
NEUTROPHILS NFR BLD AUTO: 57.8 % (ref 42.7–76)
NRBC BLD AUTO-RTO: 0 /100 WBC (ref 0–0.2)
PLATELET # BLD AUTO: 302 10*3/MM3 (ref 140–450)
PMV BLD AUTO: 10.3 FL (ref 6–12)
POTASSIUM SERPL-SCNC: 5.1 MMOL/L (ref 3.5–5.2)
PROT SERPL-MCNC: 6.9 G/DL (ref 6–8.5)
RBC # BLD AUTO: 4.65 10*6/MM3 (ref 3.77–5.28)
SODIUM SERPL-SCNC: 144 MMOL/L (ref 136–145)
TRIGL SERPL-MCNC: 171 MG/DL (ref 0–150)
TSH SERPL DL<=0.05 MIU/L-ACNC: 2.5 UIU/ML (ref 0.27–4.2)
VLDLC SERPL-MCNC: 29 MG/DL (ref 5–40)
WBC # BLD AUTO: 8.57 10*3/MM3 (ref 3.4–10.8)

## 2021-05-15 PROCEDURE — 82043 UR ALBUMIN QUANTITATIVE: CPT

## 2021-05-15 PROCEDURE — 85025 COMPLETE CBC W/AUTO DIFF WBC: CPT

## 2021-05-15 PROCEDURE — 80061 LIPID PANEL: CPT

## 2021-05-15 PROCEDURE — 84443 ASSAY THYROID STIM HORMONE: CPT

## 2021-05-15 PROCEDURE — 83036 HEMOGLOBIN GLYCOSYLATED A1C: CPT

## 2021-05-15 PROCEDURE — 36415 COLL VENOUS BLD VENIPUNCTURE: CPT

## 2021-05-15 PROCEDURE — 82306 VITAMIN D 25 HYDROXY: CPT

## 2021-05-15 PROCEDURE — 80053 COMPREHEN METABOLIC PANEL: CPT

## 2021-05-16 LAB — HBA1C MFR BLD: 7.1 % (ref 3.5–5.6)

## 2021-06-15 ENCOUNTER — PATIENT OUTREACH (OUTPATIENT)
Dept: CASE MANAGEMENT | Facility: OTHER | Age: 56
End: 2021-06-15

## 2021-06-15 NOTE — OUTREACH NOTE
Ambulatory Case Management Note        Care Plan: Diabetes   Updates made since 6/15/2021 12:00 AM      Problem: HBA1C UNCONTROLLED       Goal: Reduce HbA1c levels below 9%       Task: Educate patient on diet and exercise    Due Date: 9/25/2021   Responsible User: Wes Manzanares RN      Task: Educate patient on regular BS checks    Due Date: 9/25/2021   Priority: Routine   Responsible User: Wes Manzanares RN   Note:    Pt uses [employer], checks bs on regular basis. Avg 150     Task: Discuss diabetes treatment plan with patient    Due Date: 9/25/2021   Priority: Today   Responsible User: Wes Manzanares RN   Note:    Pt request info for endo referral as her PCP would like her to have a specialist manage her diabetes since she is type 1. ECM gave list of [facility] endo and [facility] endo via email    Pt did not have good experience with Dr. Mena.   Pt's PCP feels that she was undiagnosed type 2 and the hormones that her GYN placed her on for her post menopause bleeding caused her pancrease to shut down which make her a type 1     Problem: PATIENT IS INACTIVE       Goal: Exercise at least 20 minutes per day       Task: Discuss barriers to activity with patient. Update SDOH.    Due Date: 9/25/2021   Responsible User: Wes Manzanares RN      Task: Explore community resources including walking groups, assistance programs, and home videos.    Due Date: 9/25/2021   Priority: Routine   Responsible User: Wes Manzanares RN   Note:    Pt states she walks on the weekends with the hours that she works.          Patient Outreach    S/w pt via email. Pt reports she is doing well. States her BS are in good range. Compliant with checking them. Sees PCP for f/u as scheduled. Asked ECM for assistance with endocrinologist for managing her diabetes per PCP request.  List given via work email.     No other questions/concerns at this time. No issues with SDOH  F/u 2-3 months or sooner if needed          Wes Manzanares  RN  Ambulatory Case Management    6/15/2021, 15:12 EDT

## 2021-08-10 DIAGNOSIS — E11.9 DIABETES MELLITUS, NEW ONSET (HCC): ICD-10-CM

## 2021-08-11 NOTE — TELEPHONE ENCOUNTER
"I sent the Rx this once.  She was seen once in a hosp f/u in Sept 2020. I did not give her a f/u appt.  She was discharged to f/u with her PCP.  I changed the \"pharmacy note\" in the Rx to: \"send future requests to PCP, Dr. Jennifer Lee\".  "

## 2021-08-14 ENCOUNTER — LAB (OUTPATIENT)
Dept: LAB | Facility: HOSPITAL | Age: 56
End: 2021-08-14

## 2021-08-14 ENCOUNTER — TRANSCRIBE ORDERS (OUTPATIENT)
Dept: ADMINISTRATIVE | Facility: HOSPITAL | Age: 56
End: 2021-08-14

## 2021-08-14 DIAGNOSIS — E10.69 TYPE 1 DIABETES MELLITUS WITH OTHER SPECIFIED COMPLICATION (HCC): ICD-10-CM

## 2021-08-14 DIAGNOSIS — E55.9 VITAMIN D DEFICIENCY, UNSPECIFIED: ICD-10-CM

## 2021-08-14 DIAGNOSIS — R60.9 EDEMA, UNSPECIFIED TYPE: ICD-10-CM

## 2021-08-14 DIAGNOSIS — E78.81 LIPOID DERMATOARTHRITIS: ICD-10-CM

## 2021-08-14 DIAGNOSIS — E10.69 TYPE 1 DIABETES MELLITUS WITH OTHER SPECIFIED COMPLICATION (HCC): Primary | ICD-10-CM

## 2021-08-14 LAB
25(OH)D3 SERPL-MCNC: 38.4 NG/ML (ref 30–100)
ALBUMIN SERPL-MCNC: 4.6 G/DL (ref 3.5–5.2)
ALBUMIN UR-MCNC: 10.8 MG/DL
ALBUMIN/GLOB SERPL: 1.8 G/DL
ALP SERPL-CCNC: 83 U/L (ref 39–117)
ALT SERPL W P-5'-P-CCNC: 30 U/L (ref 1–33)
ANION GAP SERPL CALCULATED.3IONS-SCNC: 11.5 MMOL/L (ref 5–15)
AST SERPL-CCNC: 23 U/L (ref 1–32)
BASOPHILS # BLD AUTO: 0.04 10*3/MM3 (ref 0–0.2)
BASOPHILS NFR BLD AUTO: 0.5 % (ref 0–1.5)
BILIRUB SERPL-MCNC: 0.3 MG/DL (ref 0–1.2)
BUN SERPL-MCNC: 16 MG/DL (ref 6–20)
BUN/CREAT SERPL: 23.2 (ref 7–25)
CALCIUM SPEC-SCNC: 9.4 MG/DL (ref 8.6–10.5)
CHLORIDE SERPL-SCNC: 105 MMOL/L (ref 98–107)
CHOLEST SERPL-MCNC: 135 MG/DL (ref 0–200)
CO2 SERPL-SCNC: 24.5 MMOL/L (ref 22–29)
CREAT SERPL-MCNC: 0.69 MG/DL (ref 0.57–1)
DEPRECATED RDW RBC AUTO: 42 FL (ref 37–54)
EOSINOPHIL # BLD AUTO: 0.36 10*3/MM3 (ref 0–0.4)
EOSINOPHIL NFR BLD AUTO: 4.2 % (ref 0.3–6.2)
ERYTHROCYTE [DISTWIDTH] IN BLOOD BY AUTOMATED COUNT: 12.8 % (ref 12.3–15.4)
GFR SERPL CREATININE-BSD FRML MDRD: 89 ML/MIN/1.73
GLOBULIN UR ELPH-MCNC: 2.5 GM/DL
GLUCOSE SERPL-MCNC: 143 MG/DL (ref 65–99)
HCT VFR BLD AUTO: 41.7 % (ref 34–46.6)
HDLC SERPL-MCNC: 56 MG/DL (ref 40–60)
HGB BLD-MCNC: 14 G/DL (ref 12–15.9)
IMM GRANULOCYTES # BLD AUTO: 0.03 10*3/MM3 (ref 0–0.05)
IMM GRANULOCYTES NFR BLD AUTO: 0.3 % (ref 0–0.5)
LDLC SERPL CALC-MCNC: 55 MG/DL (ref 0–100)
LDLC/HDLC SERPL: 0.92 {RATIO}
LYMPHOCYTES # BLD AUTO: 2.16 10*3/MM3 (ref 0.7–3.1)
LYMPHOCYTES NFR BLD AUTO: 24.9 % (ref 19.6–45.3)
MCH RBC QN AUTO: 30.5 PG (ref 26.6–33)
MCHC RBC AUTO-ENTMCNC: 33.6 G/DL (ref 31.5–35.7)
MCV RBC AUTO: 90.8 FL (ref 79–97)
MONOCYTES # BLD AUTO: 0.55 10*3/MM3 (ref 0.1–0.9)
MONOCYTES NFR BLD AUTO: 6.4 % (ref 5–12)
NEUTROPHILS NFR BLD AUTO: 5.52 10*3/MM3 (ref 1.7–7)
NEUTROPHILS NFR BLD AUTO: 63.7 % (ref 42.7–76)
NRBC BLD AUTO-RTO: 0 /100 WBC (ref 0–0.2)
PLATELET # BLD AUTO: 291 10*3/MM3 (ref 140–450)
PMV BLD AUTO: 10.8 FL (ref 6–12)
POTASSIUM SERPL-SCNC: 4.3 MMOL/L (ref 3.5–5.2)
PROT SERPL-MCNC: 7.1 G/DL (ref 6–8.5)
RBC # BLD AUTO: 4.59 10*6/MM3 (ref 3.77–5.28)
SODIUM SERPL-SCNC: 141 MMOL/L (ref 136–145)
TRIGL SERPL-MCNC: 137 MG/DL (ref 0–150)
VLDLC SERPL-MCNC: 24 MG/DL (ref 5–40)
WBC # BLD AUTO: 8.66 10*3/MM3 (ref 3.4–10.8)

## 2021-08-14 PROCEDURE — 36415 COLL VENOUS BLD VENIPUNCTURE: CPT

## 2021-08-14 PROCEDURE — 83036 HEMOGLOBIN GLYCOSYLATED A1C: CPT

## 2021-08-14 PROCEDURE — 80053 COMPREHEN METABOLIC PANEL: CPT

## 2021-08-14 PROCEDURE — 82306 VITAMIN D 25 HYDROXY: CPT

## 2021-08-14 PROCEDURE — 85025 COMPLETE CBC W/AUTO DIFF WBC: CPT

## 2021-08-14 PROCEDURE — 82043 UR ALBUMIN QUANTITATIVE: CPT

## 2021-08-14 PROCEDURE — 80061 LIPID PANEL: CPT

## 2021-08-16 LAB — HBA1C MFR BLD: 7 % (ref 3.5–5.6)

## 2021-08-18 ENCOUNTER — PATIENT OUTREACH (OUTPATIENT)
Dept: CASE MANAGEMENT | Facility: OTHER | Age: 56
End: 2021-08-18

## 2021-08-18 NOTE — OUTREACH NOTE
Ambulatory Case Management Note    Patient Outreach    S/w pt for outreach via email. See care plan for notes.     No issue with social determinants,denies food, medication, transportation, or financial insecurities. No questions or concerns per pt at this time. Agrees to follow up outreach.          Care Plan: Diabetes   Updates made since 8/18/2021 12:00 AM      Problem: HBA1C UNCONTROLLED       Goal: Reduce HbA1c levels below 9%       Task: Educate patient on regular BS checks    Due Date: 9/25/2021   Priority: Routine   Responsible User: Wes Manzanares RN   Note:    Pt states her blood sugars are higher in the morning. States the levimir doesn't seem to do as well as the lantus, but ins doesn't cover lantus since 4/1 of this year.     ECM asked pt if she had checked with ins or pharmacy about which long acting insulin on formulary and if others are included and if so, could she reach out to pcp for change?        Task: Discuss diabetes treatment plan with patient    Due Date: 9/25/2021   Priority: Today   Responsible User: Wes Manzanares RN   Note:    Pt states she is still looking for an endocrinologist, felt the appt with cardio was more of a priority. Did get a name recently and plans to make appt.       Problem: PATIENT IS INACTIVE       Goal: Exercise at least 20 minutes per day       Task: Discuss barriers to activity with patient. Update SDOH.    Due Date: 9/25/2021   Responsible User: Wes Manzanares RN   Note:    Pt had appt with cardiologist as pcp found heart murmur. No changes to treatment plan, just monitor unless she has issues with chest pain or soa. To f/u every 6 months.           Wes Manzanares RN  Ambulatory Case Management    8/18/2021, 15:11 EDT

## 2021-10-15 ENCOUNTER — OFFICE VISIT (OUTPATIENT)
Dept: CARDIOLOGY | Facility: CLINIC | Age: 56
End: 2021-10-15

## 2021-10-15 VITALS
SYSTOLIC BLOOD PRESSURE: 116 MMHG | OXYGEN SATURATION: 93 % | HEART RATE: 96 BPM | HEIGHT: 68 IN | BODY MASS INDEX: 34.83 KG/M2 | DIASTOLIC BLOOD PRESSURE: 82 MMHG | WEIGHT: 229.8 LBS

## 2021-10-15 DIAGNOSIS — I35.0 NONRHEUMATIC AORTIC VALVE STENOSIS: Primary | ICD-10-CM

## 2021-10-15 DIAGNOSIS — E10.69 TYPE 1 DIABETES MELLITUS WITH OTHER SPECIFIED COMPLICATION (HCC): ICD-10-CM

## 2021-10-15 DIAGNOSIS — E78.5 HYPERLIPIDEMIA, UNSPECIFIED HYPERLIPIDEMIA TYPE: ICD-10-CM

## 2021-10-15 DIAGNOSIS — I50.32 CHRONIC DIASTOLIC CONGESTIVE HEART FAILURE (HCC): ICD-10-CM

## 2021-10-15 PROBLEM — E10.9 DM (DIABETES MELLITUS), TYPE 1: Status: ACTIVE | Noted: 2021-10-15

## 2021-10-15 PROCEDURE — 99214 OFFICE O/P EST MOD 30 MIN: CPT | Performed by: INTERNAL MEDICINE

## 2021-10-15 PROCEDURE — 93000 ELECTROCARDIOGRAM COMPLETE: CPT | Performed by: INTERNAL MEDICINE

## 2021-10-15 RX ORDER — DIPHENHYDRAMINE HCL 25 MG
25 CAPSULE ORAL EVERY 6 HOURS PRN
COMMUNITY

## 2021-10-15 RX ORDER — IBUPROFEN 200 MG
200 TABLET ORAL EVERY 6 HOURS PRN
COMMUNITY

## 2021-10-15 NOTE — PROGRESS NOTES
Subjective:     Encounter Date:10/15/2021      Patient ID: Yadira Car is a 56 y.o. female.    Chief Complaint:  History of Present Illness    This is a 56-year-old with a history of type 1 diabetes, obesity, hyperlipidemia, aortic stenosis, chronic diastolic congestive heart failure, who presents for follow up.      She presents today for routine follow-up.  Since I saw her last she was started on furosemide to take as needed by Dr. Lee.  She reports that she is probably only had to take it about 10 times in the last 6 months.  She denies any chest pain or significant shortness of breath.  She is unfortunately continued to gain weight over the last few months.  She continues to have intermittent lower extremity edema that is stable and unchanged.  She has occasional lightheadedness with position changes.  She denies any palpitations, near-syncope or syncope.    Prior History:  I saw the patient initially in 3/2021 when she presented for an evaluation of a murmur.  Patient was diagnosed with type 1 diabetes in 8/2020 during a hospitalization after presenting with complaints of increased thirst, polyuria and hyperglycemia.    During her initial office visit Dr. Lee noted a murmur on exam.  It was recommended that she undergo further cardiac work-up.  At the time of her initial office visit the patient reported that since starting insulin she had gained significant amount of weight.    She reported dyspnea on exertion at the time that she attributes to her weight gain.  She reported lower extremity edema only when sitting for long periods of time.  She did have a family history of TIA and stroke in her father and two-vessel CABG at the age of 48 and her mother.      At that office visit I recommended proceeding with an echocardiogram.  We did discuss the possibility of pursuing a stress test with her risk factors and her history of dyspnea on exertion.  At the time we opted to hold off on the stress test  and just pursue an echocardiogram to start.  Her echocardiogram was performed on 3/22/2021 and showed normal left ventricular systolic function and wall motion with an EF of 60 to 65%, grade 2 diastolic dysfunction, mildly dilated right ventricle, mild aortic stenosis, mild tricuspid regurgitation, and a normal right ventricular systolic pressure of 30 mmHg.  I called her to discuss the results she reported that her breathing was doing better so we opted to hold off on any further work-up or change in management.      Review of Systems   Constitutional: Positive for weight gain. Negative for malaise/fatigue.   HENT: Negative for hearing loss, hoarse voice, nosebleeds and sore throat.    Eyes: Negative for pain.   Cardiovascular: Positive for leg swelling. Negative for chest pain, claudication, cyanosis, dyspnea on exertion, irregular heartbeat, near-syncope, orthopnea, palpitations, paroxysmal nocturnal dyspnea and syncope.   Respiratory: Negative for shortness of breath and snoring.    Endocrine: Negative for cold intolerance, heat intolerance, polydipsia, polyphagia and polyuria.   Skin: Negative for itching and rash.   Musculoskeletal: Negative for arthritis, falls, joint pain, joint swelling, muscle cramps, muscle weakness and myalgias.   Gastrointestinal: Negative for constipation, diarrhea, dysphagia, heartburn, hematemesis, hematochezia, melena, nausea and vomiting.   Genitourinary: Negative for frequency, hematuria and hesitancy.   Neurological: Positive for light-headedness. Negative for excessive daytime sleepiness, dizziness, headaches, numbness and weakness.   Psychiatric/Behavioral: Negative for depression. The patient is not nervous/anxious.          Current Outpatient Medications:   •  Alcohol Swabs 70 % pads, Use as directed 4 times day, Disp: 100 each, Rfl: 1  •  Cobalamin Combinations (B12 FOLATE PO), Take  by mouth. b12 spray .. spray on tongue three times week, Disp: , Rfl:   •  Diclofenac Sodium  "(VOLTAREN) 1 % gel gel, Apply  topically to the appropriate area as directed 4 times per day, Disp: 300 g, Rfl: 3  •  diphenhydrAMINE (BENADRYL) 25 mg capsule, Take 25 mg by mouth Every 6 (Six) Hours As Needed for Itching., Disp: , Rfl:   •  fluconazole (DIFLUCAN) 100 MG tablet, Take 1 Tablet orally once per Day for 3 Days, Disp: 3 tablet, Rfl: 0  •  furosemide (LASIX) 20 MG tablet, Take 1 Tablet orally once per Day as needed for swelling, Disp: 90 tablet, Rfl: 0  •  Ginger, Zingiber officinalis, (GINGER ROOT PO), Take  by mouth. Take one twice weekly, Disp: , Rfl:   •  ibuprofen (ADVIL,MOTRIN) 200 MG tablet, Take 200 mg by mouth Every 6 (Six) Hours As Needed for Mild Pain ., Disp: , Rfl:   •  insulin detemir (Levemir) 100 UNIT/ML injection, Inject 30 Units under the skin into the appropriate area as directed Daily., Disp: 30 mL, Rfl: 5  •  insulin lispro (humaLOG) 100 UNIT/ML injection, Inject 12 Units subcutaneously 3 times per Day WITH MEALS. max of 60 units daily.  if blood [id number] add 2 units per meal; if 250-300 add 4 units, Disp: 90 mL, Rfl: 3  •  Insulin Syringe-Needle U-100 (TRUEplus Insulin Syringe) 31G X 5/16\" 1 ML misc, Use 4 times per day, Disp: 360 each, Rfl: 0  •  NON FORMULARY, Sovereign silver  Take one three times weekly, Disp: , Rfl:   •  potassium chloride (K-DUR,KLOR-CON) 20 MEQ CR tablet, Take 1 Tablet orally once per Day if taking furosemide, Disp: 90 tablet, Rfl: 0  •  rosuvastatin (CRESTOR) 10 MG tablet, Take 1 tablet by mouth Daily., Disp: 90 tablet, Rfl: 1  •  insulin lispro (humaLOG) 100 UNIT/ML injection, Inject 10 Units subcutaneously 3 times per Day WITH MEALS. max of 60 units daily.  if 200-250 add 2 units per meal; if 250-300 add 4 units, Disp: 90 mL, Rfl: 3    Past Medical History:   Diagnosis Date   • Allergic    • Diabetes mellitus (HCC) 08/2020    New onset   • Diabetes mellitus type I (HCC)    • Dyslipidemia    • Low back pain    • Renal calculi     from calcium   • Spinal " "stenosis    • Systolic murmur        Past Surgical History:   Procedure Laterality Date   • D & C AND LAPAROSCOPY     • ECTOPIC PREGNANCY     • WISDOM TOOTH EXTRACTION         Family History   Problem Relation Age of Onset   • Heart disease Mother    • Diabetes Mother    • Arthritis Mother    • Hypertension Father    • Diabetes Father    • Stroke Father    • Hyperlipidemia Father    • No Known Problems Brother    • Heart disease Maternal Uncle    • Diabetes Maternal Grandmother    • Diabetes Maternal Grandfather    • Heart disease Maternal Grandfather        Social History     Tobacco Use   • Smoking status: Former Smoker     Packs/day: 0.50     Years: 35.00     Pack years: 17.50   • Smokeless tobacco: Never Used   • Tobacco comment: On and Off   Substance Use Topics   • Alcohol use: Yes     Comment: once-twice/yr.--caffeine use   • Drug use: No         ECG 12 Lead    Date/Time: 10/15/2021 12:10 PM  Performed by: Ally Thompson MD  Authorized by: Ally Thompson MD   Comparison: compared with previous ECG   Similar to previous ECG  Rhythm: sinus rhythm               Objective:     Visit Vitals  /82 (BP Location: Right arm, Patient Position: Sitting, Cuff Size: Large Adult)   Pulse 96   Ht 172.7 cm (68\")   Wt 104 kg (229 lb 12.8 oz)   SpO2 93%   BMI 34.94 kg/m²         Constitutional:       Appearance: Normal appearance. Well-developed.   Eyes:      General: Lids are normal.      Conjunctiva/sclera: Conjunctivae normal.      Pupils: Pupils are equal, round, and reactive to light.   HENT:      Head: Normocephalic and atraumatic.   Neck:      Vascular: No carotid bruit or JVD.      Lymphadenopathy: No cervical adenopathy.   Pulmonary:      Effort: Pulmonary effort is normal.      Breath sounds: Normal breath sounds.   Cardiovascular:      Normal rate. Regular rhythm.      No gallop.   Pulses:     Radial: 2+ bilaterally.  Edema:     Peripheral edema absent.   Abdominal:      Palpations: Abdomen is soft. "   Musculoskeletal:      Cervical back: Full passive range of motion without pain, normal range of motion and neck supple. Skin:     General: Skin is warm and dry.   Neurological:      Mental Status: Alert and oriented to person, place, and time.             Assessment:          Diagnosis Plan   1. Nonrheumatic aortic valve stenosis     2. Chronic diastolic congestive heart failure (HCC)     3. Hyperlipidemia, unspecified hyperlipidemia type            Plan:       1.  Aortic stenosis.  Mild in her last echocardiogram.  We will plan on rechecking in the next 1 to 2 years.  2.  Chronic diastolic congestive heart failure.  No evidence of volume overload on exam.  She has furosemide to take as needed.  3.  Hyperlipidemia.  On rosuvastatin.  4.  Diabetes mellitus type 1    I will plan on seeing the patient back again in 1 year or sooner if further issues arise.

## 2021-10-20 ENCOUNTER — PATIENT OUTREACH (OUTPATIENT)
Dept: CASE MANAGEMENT | Facility: OTHER | Age: 56
End: 2021-10-20

## 2021-10-20 NOTE — OUTREACH NOTE
General & Health Literacy Assessment    Questions/Answers      Most Recent Value   Home Care Services No   Health Literacy Excellent        Care Evaluation    Questions/Answers      Most Recent Value   Suggested Appointments Make an Appointment with Endocrinology  [pt states she has an appt with new endo on 2/2 Dr. Nunn]   Annual Wellness Visit:  Patient Has Completed      Ambulatory Case Management Note    Patient Outreach    S/w pt for f/u via email. See care plan for notes. Pt has appt with new endo. Dr. Nunn.   PCP and pt agree that after her GYN put her on hormones for post menopausal issue, that the hormones damaged her pancrease, causing the diabetes/ pt plans to discuss this with new endo.        No issue with social determinants,denies food, medication, transportation, or financial insecurities. No questions or concerns per pt at this time. Advised pt to call RN-ECM with any new needs. Encouraged use of [facility] nurseline if needed.  Agrees to follow up outreach.     Care Plan: Diabetes   Updates made since 10/20/2021 12:00 AM      Problem: HBA1C UNCONTROLLED       Goal: Reduce HbA1c levels below 9%       Task: Educate patient on diet and exercise    Due Date: 12/31/2021   Responsible User: Wes Manzanares RN   Note:    Pt had check up with cardio - all good and next f/u in 1 year     Task: Educate patient on regular BS checks    Due Date: 12/31/2021   Priority: Routine   Responsible User: Wes Manzanares RN   Note:    Pt compliant with [employer] with checking blood sugars.            Wes Manzanares RN  Ambulatory Case Management    10/20/2021, 17:20 EDT

## 2021-12-13 ENCOUNTER — PATIENT OUTREACH (OUTPATIENT)
Dept: CASE MANAGEMENT | Facility: OTHER | Age: 56
End: 2021-12-13

## 2021-12-13 NOTE — OUTREACH NOTE
Ambulatory Case Management Note    Patient Outreach    s/w pt via email for f/u. Pt compliant with BS checks.  Awaiting endo appt in Feb.   Agreed will f/u after endo visit. No issue with social determinants,denies food, medication, transportation, or financial insecurities. No questions or concerns per pt at this time. Advised pt to call RN-ECM with any new needs. Encouraged use of [facility] nurseline if needed.  Agrees to follow up outreach    Care Plan: Diabetes   Updates made since 12/13/2021 12:00 AM      Problem: HBA1C UNCONTROLLED       Goal: Reduce HbA1c levels below 9%       Task: Educate patient on diet and exercise    Due Date: 3/31/2022   Responsible User: Wes Manzanares RN   Note:    Pt had check up with cardio - all good and next f/u in 1 year     Task: Educate patient on regular BS checks Completed 12/13/2021   Priority: Routine   Responsible User: Wes Manzanares RN   Note:    Pt compliant with [employer] with checking blood sugars.        Task: Discuss diabetes treatment plan with patient    Due Date: 3/31/2022   Priority: Today   Responsible User: Wes Manzanares RN   Note:    Pt compliant with livongo and BS checks. Am avg 163     Problem: PATIENT IS INACTIVE       Goal: Exercise at least 20 minutes per day       Task: Discuss barriers to activity with patient. Update SDOH. Completed 12/13/2021   Due Date: 12/31/2021   Responsible User: Wes Manzanares RN   Note:    Pt had appt with cardiologist as pcp found heart murmur. No changes to treatment plan, just monitor unless she has issues with chest pain or soa. To f/u every 6 months.       Task: Explore community resources including walking groups, assistance programs, and home videos. Completed 12/13/2021   Due Date: 12/31/2021   Priority: Routine   Responsible User: Wes Manzanares RN   Note:    Pt states she walks on the weekends with the hours that she works.          Wes Manzanares RN  Ambulatory Case Management    12/13/2021, 15:52  EST

## 2022-01-22 ENCOUNTER — TRANSCRIBE ORDERS (OUTPATIENT)
Dept: ADMINISTRATIVE | Facility: HOSPITAL | Age: 57
End: 2022-01-22

## 2022-01-22 ENCOUNTER — LAB (OUTPATIENT)
Dept: LAB | Facility: HOSPITAL | Age: 57
End: 2022-01-22

## 2022-01-22 DIAGNOSIS — E55.9 VITAMIN D DEFICIENCY, UNSPECIFIED: ICD-10-CM

## 2022-01-22 DIAGNOSIS — M54.50 LOW BACK PAIN, UNSPECIFIED BACK PAIN LATERALITY, UNSPECIFIED CHRONICITY, UNSPECIFIED WHETHER SCIATICA PRESENT: ICD-10-CM

## 2022-01-22 DIAGNOSIS — E10.9 INSULIN DEPENDENT DIABETES MELLITUS TYPE IA: Primary | ICD-10-CM

## 2022-01-22 DIAGNOSIS — E10.9 INSULIN DEPENDENT DIABETES MELLITUS TYPE IA: ICD-10-CM

## 2022-01-22 DIAGNOSIS — E78.81 LIPOID DERMATOARTHRITIS: ICD-10-CM

## 2022-01-22 LAB
25(OH)D3 SERPL-MCNC: 47.8 NG/ML (ref 30–100)
ALBUMIN SERPL-MCNC: 4.7 G/DL (ref 3.5–5.2)
ALBUMIN UR-MCNC: 4.9 MG/DL
ALBUMIN/GLOB SERPL: 1.7 G/DL
ALP SERPL-CCNC: 85 U/L (ref 39–117)
ALT SERPL W P-5'-P-CCNC: 23 U/L (ref 1–33)
ANION GAP SERPL CALCULATED.3IONS-SCNC: 9.1 MMOL/L (ref 5–15)
AST SERPL-CCNC: 21 U/L (ref 1–32)
BASOPHILS # BLD AUTO: 0.03 10*3/MM3 (ref 0–0.2)
BASOPHILS NFR BLD AUTO: 0.3 % (ref 0–1.5)
BILIRUB SERPL-MCNC: 0.5 MG/DL (ref 0–1.2)
BUN SERPL-MCNC: 19 MG/DL (ref 6–20)
BUN/CREAT SERPL: 27.1 (ref 7–25)
CALCIUM SPEC-SCNC: 10.1 MG/DL (ref 8.6–10.5)
CHLORIDE SERPL-SCNC: 104 MMOL/L (ref 98–107)
CHOLEST SERPL-MCNC: 177 MG/DL (ref 0–200)
CO2 SERPL-SCNC: 29.9 MMOL/L (ref 22–29)
CREAT SERPL-MCNC: 0.7 MG/DL (ref 0.57–1)
DEPRECATED RDW RBC AUTO: 43.9 FL (ref 37–54)
EOSINOPHIL # BLD AUTO: 0.36 10*3/MM3 (ref 0–0.4)
EOSINOPHIL NFR BLD AUTO: 4.1 % (ref 0.3–6.2)
ERYTHROCYTE [DISTWIDTH] IN BLOOD BY AUTOMATED COUNT: 12.9 % (ref 12.3–15.4)
GFR SERPL CREATININE-BSD FRML MDRD: 87 ML/MIN/1.73
GLOBULIN UR ELPH-MCNC: 2.8 GM/DL
GLUCOSE SERPL-MCNC: 163 MG/DL (ref 65–99)
HCT VFR BLD AUTO: 44.9 % (ref 34–46.6)
HDLC SERPL-MCNC: 53 MG/DL (ref 40–60)
HGB BLD-MCNC: 14.4 G/DL (ref 12–15.9)
IMM GRANULOCYTES # BLD AUTO: 0.05 10*3/MM3 (ref 0–0.05)
IMM GRANULOCYTES NFR BLD AUTO: 0.6 % (ref 0–0.5)
LDLC SERPL CALC-MCNC: 89 MG/DL (ref 0–100)
LDLC/HDLC SERPL: 1.56 {RATIO}
LYMPHOCYTES # BLD AUTO: 2.07 10*3/MM3 (ref 0.7–3.1)
LYMPHOCYTES NFR BLD AUTO: 23.7 % (ref 19.6–45.3)
MCH RBC QN AUTO: 29.7 PG (ref 26.6–33)
MCHC RBC AUTO-ENTMCNC: 32.1 G/DL (ref 31.5–35.7)
MCV RBC AUTO: 92.6 FL (ref 79–97)
MONOCYTES # BLD AUTO: 0.51 10*3/MM3 (ref 0.1–0.9)
MONOCYTES NFR BLD AUTO: 5.8 % (ref 5–12)
NEUTROPHILS NFR BLD AUTO: 5.71 10*3/MM3 (ref 1.7–7)
NEUTROPHILS NFR BLD AUTO: 65.5 % (ref 42.7–76)
NRBC BLD AUTO-RTO: 0 /100 WBC (ref 0–0.2)
PLATELET # BLD AUTO: 304 10*3/MM3 (ref 140–450)
PMV BLD AUTO: 10.8 FL (ref 6–12)
POTASSIUM SERPL-SCNC: 4.6 MMOL/L (ref 3.5–5.2)
PROT SERPL-MCNC: 7.5 G/DL (ref 6–8.5)
RBC # BLD AUTO: 4.85 10*6/MM3 (ref 3.77–5.28)
SODIUM SERPL-SCNC: 143 MMOL/L (ref 136–145)
TRIGL SERPL-MCNC: 206 MG/DL (ref 0–150)
TSH SERPL DL<=0.05 MIU/L-ACNC: 1.61 UIU/ML (ref 0.27–4.2)
VLDLC SERPL-MCNC: 35 MG/DL (ref 5–40)
WBC NRBC COR # BLD: 8.73 10*3/MM3 (ref 3.4–10.8)

## 2022-01-22 PROCEDURE — 82306 VITAMIN D 25 HYDROXY: CPT

## 2022-01-22 PROCEDURE — 82043 UR ALBUMIN QUANTITATIVE: CPT

## 2022-01-22 PROCEDURE — 83036 HEMOGLOBIN GLYCOSYLATED A1C: CPT

## 2022-01-22 PROCEDURE — 80061 LIPID PANEL: CPT

## 2022-01-22 PROCEDURE — 36415 COLL VENOUS BLD VENIPUNCTURE: CPT

## 2022-01-22 PROCEDURE — 80050 GENERAL HEALTH PANEL: CPT

## 2022-01-24 LAB — HBA1C MFR BLD: 7.2 % (ref 3.5–5.6)

## 2022-02-02 ENCOUNTER — LAB (OUTPATIENT)
Dept: LAB | Facility: HOSPITAL | Age: 57
End: 2022-02-02

## 2022-02-02 ENCOUNTER — TRANSCRIBE ORDERS (OUTPATIENT)
Dept: ADMINISTRATIVE | Facility: HOSPITAL | Age: 57
End: 2022-02-02

## 2022-02-02 DIAGNOSIS — E13.9 MATURITY ONSET DIABETES MELLITUS IN YOUNG: ICD-10-CM

## 2022-02-02 DIAGNOSIS — E13.9 MATURITY ONSET DIABETES MELLITUS IN YOUNG: Primary | ICD-10-CM

## 2022-02-02 DIAGNOSIS — R63.5 ABNORMAL WEIGHT GAIN: ICD-10-CM

## 2022-02-02 LAB
ALBUMIN SERPL-MCNC: 4.7 G/DL (ref 3.5–5.2)
ALBUMIN UR-MCNC: 10.7 MG/DL
ALBUMIN/GLOB SERPL: 1.7 G/DL
ALP SERPL-CCNC: 87 U/L (ref 39–117)
ALT SERPL W P-5'-P-CCNC: 20 U/L (ref 1–33)
ANION GAP SERPL CALCULATED.3IONS-SCNC: 11 MMOL/L (ref 5–15)
AST SERPL-CCNC: 15 U/L (ref 1–32)
BILIRUB SERPL-MCNC: 0.3 MG/DL (ref 0–1.2)
BUN SERPL-MCNC: 18 MG/DL (ref 6–20)
BUN/CREAT SERPL: 24.7 (ref 7–25)
CALCIUM SPEC-SCNC: 9.4 MG/DL (ref 8.6–10.5)
CHLORIDE SERPL-SCNC: 104 MMOL/L (ref 98–107)
CO2 SERPL-SCNC: 23 MMOL/L (ref 22–29)
CREAT SERPL-MCNC: 0.73 MG/DL (ref 0.57–1)
CREAT UR-MCNC: 89.2 MG/DL
GFR SERPL CREATININE-BSD FRML MDRD: 83 ML/MIN/1.73
GLOBULIN UR ELPH-MCNC: 2.7 GM/DL
GLUCOSE SERPL-MCNC: 226 MG/DL (ref 65–99)
MICROALBUMIN/CREAT UR: 120 MG/G
POTASSIUM SERPL-SCNC: 3.8 MMOL/L (ref 3.5–5.2)
PROT SERPL-MCNC: 7.4 G/DL (ref 6–8.5)
SODIUM SERPL-SCNC: 138 MMOL/L (ref 136–145)

## 2022-02-02 PROCEDURE — 36415 COLL VENOUS BLD VENIPUNCTURE: CPT

## 2022-02-02 PROCEDURE — 82043 UR ALBUMIN QUANTITATIVE: CPT

## 2022-02-02 PROCEDURE — 84681 ASSAY OF C-PEPTIDE: CPT

## 2022-02-02 PROCEDURE — 82570 ASSAY OF URINE CREATININE: CPT

## 2022-02-02 PROCEDURE — 80053 COMPREHEN METABOLIC PANEL: CPT

## 2022-02-03 LAB — C PEPTIDE SERPL-MCNC: 6.3 NG/ML (ref 1.1–4.4)

## 2022-02-11 ENCOUNTER — PATIENT OUTREACH (OUTPATIENT)
Dept: CASE MANAGEMENT | Facility: OTHER | Age: 57
End: 2022-02-11

## 2022-02-11 NOTE — OUTREACH NOTE
Ambulatory Case Management Note    Patient Outreach    Spoke with patient for outreach via email. Pt doing well. See care plan for notes. Placed in monitoring status with survey sent.  No issue with social determinants,denies food, medication, transportation, or financial insecurities. No questions or concerns per pt at this time. Advised pt to call RN-ECM with any new needs.  Agrees to follow up outreach.    Care Plan: Diabetes   Updates made since 2/11/2022 12:00 AM      Problem: HBA1C UNCONTROLLED       Goal: Reduce HbA1c levels below 9%       Task: Educate patient on diet and exercise Completed 2/11/2022   Due Date: 3/31/2022   Responsible User: Wes Manzanares RN   Note:    Pt had check up with cardio - all good and next f/u in 1 year     Task: Discuss diabetes treatment plan with patient Completed 2/11/2022   Due Date: 3/31/2022   Priority: Today   Responsible User: Wes Manzanares RN   Note:    Pt saw new negrita SAMUELS - Dr. Nunn and states the visit was great, felt MD was amazing. Reports she is so glad she went to see her. [place] she was very educational and good about answering questions. MD adjusted pt's insulin dosages and just in a week, hs seen a big difference in her blood sugar readings.   Pt will see NP in may for recheck and if any other medication adjustments need to be made.     Pt compliant with livongo and checking blood sugars. Average 116-132.          Wes Manzanares RN  Ambulatory Case Management    2/11/2022, 16:00 EST

## 2022-04-25 ENCOUNTER — LAB (OUTPATIENT)
Dept: LAB | Facility: HOSPITAL | Age: 57
End: 2022-04-25

## 2022-04-25 ENCOUNTER — TRANSCRIBE ORDERS (OUTPATIENT)
Dept: ADMINISTRATIVE | Facility: HOSPITAL | Age: 57
End: 2022-04-25

## 2022-04-25 DIAGNOSIS — E13.9 LATENT AUTOIMMUNE DIABETES IN ADULTS (LADA), MANAGED AS TYPE 1: ICD-10-CM

## 2022-04-25 DIAGNOSIS — E13.9 LATENT AUTOIMMUNE DIABETES IN ADULTS (LADA), MANAGED AS TYPE 1: Primary | ICD-10-CM

## 2022-04-25 LAB — HBA1C MFR BLD: 6.9 % (ref 4.8–5.6)

## 2022-04-25 PROCEDURE — 83036 HEMOGLOBIN GLYCOSYLATED A1C: CPT

## 2022-04-25 PROCEDURE — 36415 COLL VENOUS BLD VENIPUNCTURE: CPT

## 2022-05-12 ENCOUNTER — LAB (OUTPATIENT)
Dept: LAB | Facility: HOSPITAL | Age: 57
End: 2022-05-12

## 2022-05-12 ENCOUNTER — TRANSCRIBE ORDERS (OUTPATIENT)
Dept: ADMINISTRATIVE | Facility: HOSPITAL | Age: 57
End: 2022-05-12

## 2022-05-12 DIAGNOSIS — E13.9: ICD-10-CM

## 2022-05-12 DIAGNOSIS — E13.9: Primary | ICD-10-CM

## 2022-05-12 PROCEDURE — 36415 COLL VENOUS BLD VENIPUNCTURE: CPT

## 2022-05-12 PROCEDURE — 86341 ISLET CELL ANTIBODY: CPT

## 2022-05-16 LAB — GAD65 AB SER IA-ACNC: <5 U/ML (ref 0–5)

## 2022-05-20 LAB — ZNT8 AB SERPL IA-ACNC: <15 U/ML

## 2022-07-05 ENCOUNTER — TRANSCRIBE ORDERS (OUTPATIENT)
Dept: ADMINISTRATIVE | Facility: HOSPITAL | Age: 57
End: 2022-07-05

## 2022-07-05 DIAGNOSIS — E13.9 LATENT AUTOIMMUNE DIABETES IN ADULTS (LADA), MANAGED AS TYPE 2: Primary | ICD-10-CM

## 2022-07-11 ENCOUNTER — HOSPITAL ENCOUNTER (OUTPATIENT)
Dept: GENERAL RADIOLOGY | Facility: HOSPITAL | Age: 57
Discharge: HOME | End: 2022-07-11
Admitting: FAMILY MEDICINE

## 2022-07-11 ENCOUNTER — TRANSCRIBE ORDERS (OUTPATIENT)
Dept: ADMINISTRATIVE | Facility: HOSPITAL | Age: 57
End: 2022-07-11

## 2022-07-11 DIAGNOSIS — M54.50 LOW BACK PAIN, UNSPECIFIED BACK PAIN LATERALITY, UNSPECIFIED CHRONICITY, UNSPECIFIED WHETHER SCIATICA PRESENT: ICD-10-CM

## 2022-07-11 DIAGNOSIS — M54.50 LOW BACK PAIN, UNSPECIFIED BACK PAIN LATERALITY, UNSPECIFIED CHRONICITY, UNSPECIFIED WHETHER SCIATICA PRESENT: Primary | ICD-10-CM

## 2022-07-11 PROCEDURE — 72100 X-RAY EXAM L-S SPINE 2/3 VWS: CPT

## 2022-07-19 ENCOUNTER — TRANSCRIBE ORDERS (OUTPATIENT)
Dept: ADMINISTRATIVE | Facility: HOSPITAL | Age: 57
End: 2022-07-19

## 2022-07-19 DIAGNOSIS — M54.50 LOW BACK PAIN, UNSPECIFIED BACK PAIN LATERALITY, UNSPECIFIED CHRONICITY, UNSPECIFIED WHETHER SCIATICA PRESENT: ICD-10-CM

## 2022-07-19 DIAGNOSIS — R60.9 EDEMA, UNSPECIFIED TYPE: ICD-10-CM

## 2022-07-19 DIAGNOSIS — E55.9 VITAMIN D DEFICIENCY: ICD-10-CM

## 2022-07-19 DIAGNOSIS — E10.9 INSULIN DEPENDENT DIABETES MELLITUS TYPE IA: Primary | ICD-10-CM

## 2022-07-19 DIAGNOSIS — E78.81 LIPOID DERMATOARTHRITIS: ICD-10-CM

## 2022-08-08 ENCOUNTER — TRANSCRIBE ORDERS (OUTPATIENT)
Dept: PHYSICAL THERAPY | Facility: CLINIC | Age: 57
End: 2022-08-08

## 2022-08-20 ENCOUNTER — LAB (OUTPATIENT)
Dept: LAB | Facility: HOSPITAL | Age: 57
End: 2022-08-20

## 2022-08-20 DIAGNOSIS — R60.9 EDEMA, UNSPECIFIED TYPE: ICD-10-CM

## 2022-08-20 DIAGNOSIS — E10.9 INSULIN DEPENDENT DIABETES MELLITUS TYPE IA: ICD-10-CM

## 2022-08-20 DIAGNOSIS — E55.9 VITAMIN D DEFICIENCY: ICD-10-CM

## 2022-08-20 DIAGNOSIS — M54.50 LOW BACK PAIN, UNSPECIFIED BACK PAIN LATERALITY, UNSPECIFIED CHRONICITY, UNSPECIFIED WHETHER SCIATICA PRESENT: ICD-10-CM

## 2022-08-20 DIAGNOSIS — E78.81 LIPOID DERMATOARTHRITIS: ICD-10-CM

## 2022-08-20 LAB
25(OH)D3 SERPL-MCNC: 60.1 NG/ML (ref 30–100)
ALBUMIN SERPL-MCNC: 5 G/DL (ref 3.5–5.2)
ALBUMIN UR-MCNC: 12.2 MG/DL
ALBUMIN/GLOB SERPL: 2.3 G/DL
ALP SERPL-CCNC: 96 U/L (ref 39–117)
ALT SERPL W P-5'-P-CCNC: 24 U/L (ref 1–33)
ANION GAP SERPL CALCULATED.3IONS-SCNC: 8.7 MMOL/L (ref 5–15)
AST SERPL-CCNC: 23 U/L (ref 1–32)
BASOPHILS # BLD AUTO: 0.04 10*3/MM3 (ref 0–0.2)
BASOPHILS NFR BLD AUTO: 0.4 % (ref 0–1.5)
BILIRUB SERPL-MCNC: 0.5 MG/DL (ref 0–1.2)
BUN SERPL-MCNC: 18 MG/DL (ref 6–20)
BUN/CREAT SERPL: 24.3 (ref 7–25)
CALCIUM SPEC-SCNC: 9.5 MG/DL (ref 8.6–10.5)
CHLORIDE SERPL-SCNC: 102 MMOL/L (ref 98–107)
CHOLEST SERPL-MCNC: 149 MG/DL (ref 0–200)
CO2 SERPL-SCNC: 28.3 MMOL/L (ref 22–29)
CREAT SERPL-MCNC: 0.74 MG/DL (ref 0.57–1)
CREAT UR-MCNC: 179.3 MG/DL
DEPRECATED RDW RBC AUTO: 42.5 FL (ref 37–54)
EGFRCR SERPLBLD CKD-EPI 2021: 95.7 ML/MIN/1.73
EOSINOPHIL # BLD AUTO: 0.34 10*3/MM3 (ref 0–0.4)
EOSINOPHIL NFR BLD AUTO: 3.5 % (ref 0.3–6.2)
ERYTHROCYTE [DISTWIDTH] IN BLOOD BY AUTOMATED COUNT: 13 % (ref 12.3–15.4)
GLOBULIN UR ELPH-MCNC: 2.2 GM/DL
GLUCOSE SERPL-MCNC: 94 MG/DL (ref 65–99)
HCT VFR BLD AUTO: 41.5 % (ref 34–46.6)
HDLC SERPL-MCNC: 53 MG/DL (ref 40–60)
HGB BLD-MCNC: 13.6 G/DL (ref 12–15.9)
IMM GRANULOCYTES # BLD AUTO: 0.03 10*3/MM3 (ref 0–0.05)
IMM GRANULOCYTES NFR BLD AUTO: 0.3 % (ref 0–0.5)
LDLC SERPL CALC-MCNC: 72 MG/DL (ref 0–100)
LDLC/HDLC SERPL: 1.3 {RATIO}
LYMPHOCYTES # BLD AUTO: 2.29 10*3/MM3 (ref 0.7–3.1)
LYMPHOCYTES NFR BLD AUTO: 23.7 % (ref 19.6–45.3)
MCH RBC QN AUTO: 29.7 PG (ref 26.6–33)
MCHC RBC AUTO-ENTMCNC: 32.8 G/DL (ref 31.5–35.7)
MCV RBC AUTO: 90.6 FL (ref 79–97)
MICROALBUMIN/CREAT UR: 68 MG/G
MONOCYTES # BLD AUTO: 0.63 10*3/MM3 (ref 0.1–0.9)
MONOCYTES NFR BLD AUTO: 6.5 % (ref 5–12)
NEUTROPHILS NFR BLD AUTO: 6.32 10*3/MM3 (ref 1.7–7)
NEUTROPHILS NFR BLD AUTO: 65.6 % (ref 42.7–76)
NRBC BLD AUTO-RTO: 0 /100 WBC (ref 0–0.2)
PLATELET # BLD AUTO: 315 10*3/MM3 (ref 140–450)
PMV BLD AUTO: 10.5 FL (ref 6–12)
POTASSIUM SERPL-SCNC: 4.3 MMOL/L (ref 3.5–5.2)
PROT SERPL-MCNC: 7.2 G/DL (ref 6–8.5)
RBC # BLD AUTO: 4.58 10*6/MM3 (ref 3.77–5.28)
SODIUM SERPL-SCNC: 139 MMOL/L (ref 136–145)
TRIGL SERPL-MCNC: 135 MG/DL (ref 0–150)
TSH SERPL DL<=0.05 MIU/L-ACNC: 1.87 UIU/ML (ref 0.27–4.2)
VLDLC SERPL-MCNC: 24 MG/DL (ref 5–40)
WBC NRBC COR # BLD: 9.65 10*3/MM3 (ref 3.4–10.8)

## 2022-08-20 PROCEDURE — 36415 COLL VENOUS BLD VENIPUNCTURE: CPT

## 2022-08-20 PROCEDURE — 83036 HEMOGLOBIN GLYCOSYLATED A1C: CPT

## 2022-08-20 PROCEDURE — 82570 ASSAY OF URINE CREATININE: CPT

## 2022-08-20 PROCEDURE — 80050 GENERAL HEALTH PANEL: CPT

## 2022-08-20 PROCEDURE — 82043 UR ALBUMIN QUANTITATIVE: CPT

## 2022-08-20 PROCEDURE — 82306 VITAMIN D 25 HYDROXY: CPT

## 2022-08-20 PROCEDURE — 80061 LIPID PANEL: CPT

## 2022-08-22 LAB — HBA1C MFR BLD: 6.5 % (ref 3.5–5.6)

## 2022-08-24 ENCOUNTER — TREATMENT (OUTPATIENT)
Dept: PHYSICAL THERAPY | Facility: CLINIC | Age: 57
End: 2022-08-24

## 2022-08-24 DIAGNOSIS — M54.50 LOW BACK PAIN, UNSPECIFIED BACK PAIN LATERALITY, UNSPECIFIED CHRONICITY, UNSPECIFIED WHETHER SCIATICA PRESENT: Primary | ICD-10-CM

## 2022-08-24 DIAGNOSIS — M47.819 SPONDYLOSIS WITHOUT MYELOPATHY OR RADICULOPATHY, SITE UNSPECIFIED: ICD-10-CM

## 2022-08-24 PROCEDURE — 97140 MANUAL THERAPY 1/> REGIONS: CPT | Performed by: PHYSICAL THERAPIST

## 2022-08-24 PROCEDURE — 97112 NEUROMUSCULAR REEDUCATION: CPT | Performed by: PHYSICAL THERAPIST

## 2022-08-24 PROCEDURE — 97110 THERAPEUTIC EXERCISES: CPT | Performed by: PHYSICAL THERAPIST
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
## 2022-09-12 ENCOUNTER — PATIENT OUTREACH (OUTPATIENT)
Dept: CASE MANAGEMENT | Facility: OTHER | Age: 57
End: 2022-09-12

## 2022-09-12 NOTE — OUTREACH NOTE
AMBULATORY CASE MANAGEMENT NOTE    Name and Relationship of Patient/Support Person: Yadira Car J - Self    Pt had follow up with endo. a1c down to 6.5. was told she is no longer a type 1 diabetic, is now a type 2 and told her PCP can handle her care if she prefers.   Will continue to monitor.   No issue with social determinants,denies food, medication, transportation, or financial insecurities. No questions or concerns per pt at this time. Advised pt to call RN-ECM with any new needs. Agrees to follow up outreach.        LINCOLN WOOD  Ambulatory Case Management    9/12/2022, 14:44 EDT

## 2022-09-20 ENCOUNTER — TRANSCRIBE ORDERS (OUTPATIENT)
Dept: ADMINISTRATIVE | Facility: HOSPITAL | Age: 57
End: 2022-09-20

## 2022-09-20 DIAGNOSIS — Z12.31 SCREENING MAMMOGRAM FOR BREAST CANCER: ICD-10-CM

## 2022-09-20 DIAGNOSIS — N95.9 MENOPAUSAL AND POSTMENOPAUSAL DISORDER: Primary | ICD-10-CM

## 2022-10-03 ENCOUNTER — LAB (OUTPATIENT)
Dept: LAB | Facility: HOSPITAL | Age: 57
End: 2022-10-03

## 2022-10-03 ENCOUNTER — TRANSCRIBE ORDERS (OUTPATIENT)
Dept: ADMINISTRATIVE | Facility: HOSPITAL | Age: 57
End: 2022-10-03

## 2022-10-03 DIAGNOSIS — R35.0 URINARY FREQUENCY: Primary | ICD-10-CM

## 2022-10-03 DIAGNOSIS — R35.0 URINARY FREQUENCY: ICD-10-CM

## 2022-10-03 LAB
BILIRUB UR QL STRIP: NEGATIVE
CLARITY UR: CLEAR
COLOR UR: YELLOW
GLUCOSE UR STRIP-MCNC: ABNORMAL MG/DL
HGB UR QL STRIP.AUTO: NEGATIVE
KETONES UR QL STRIP: ABNORMAL
LEUKOCYTE ESTERASE UR QL STRIP.AUTO: NEGATIVE
NITRITE UR QL STRIP: NEGATIVE
PH UR STRIP.AUTO: 6 [PH] (ref 5–8)
PROT UR QL STRIP: NEGATIVE
SP GR UR STRIP: >=1.03 (ref 1–1.03)
UROBILINOGEN UR QL STRIP: ABNORMAL

## 2022-10-03 PROCEDURE — 81003 URINALYSIS AUTO W/O SCOPE: CPT

## 2022-10-03 PROCEDURE — 87086 URINE CULTURE/COLONY COUNT: CPT | Performed by: FAMILY MEDICINE

## 2022-10-04 LAB — BACTERIA SPEC AEROBE CULT: NORMAL

## 2022-10-28 ENCOUNTER — OFFICE VISIT (OUTPATIENT)
Dept: CARDIOLOGY | Facility: CLINIC | Age: 57
End: 2022-10-28

## 2022-10-28 VITALS
BODY MASS INDEX: 32.37 KG/M2 | HEART RATE: 65 BPM | SYSTOLIC BLOOD PRESSURE: 112 MMHG | OXYGEN SATURATION: 98 % | HEIGHT: 68 IN | WEIGHT: 213.6 LBS | DIASTOLIC BLOOD PRESSURE: 74 MMHG

## 2022-10-28 DIAGNOSIS — I35.0 NONRHEUMATIC AORTIC VALVE STENOSIS: ICD-10-CM

## 2022-10-28 DIAGNOSIS — E78.5 HYPERLIPIDEMIA, UNSPECIFIED HYPERLIPIDEMIA TYPE: ICD-10-CM

## 2022-10-28 DIAGNOSIS — I50.32 CHRONIC DIASTOLIC CONGESTIVE HEART FAILURE: Primary | ICD-10-CM

## 2022-10-28 DIAGNOSIS — E10.69 TYPE 1 DIABETES MELLITUS WITH OTHER SPECIFIED COMPLICATION: ICD-10-CM

## 2022-10-28 PROCEDURE — 99214 OFFICE O/P EST MOD 30 MIN: CPT | Performed by: INTERNAL MEDICINE

## 2022-10-28 PROCEDURE — 93000 ELECTROCARDIOGRAM COMPLETE: CPT | Performed by: INTERNAL MEDICINE

## 2022-10-28 NOTE — PROGRESS NOTES
Subjective:     Encounter Date:10/28/2022      Patient ID: Yadira Car is a 57 y.o. female.    Chief Complaint:  History of Present Illness    This is a 57-year-old with diabetes, obesity, hyperlipidemia, aortic stenosis, chronic diastolic congestive heart failure, who presents for follow up.      The patient presents today for routine follow-up.  She reports that she has had a really good year.  She indicates that her pancreas started to function again and she was able to come off the insulin.  She is now considered a type II rather than a type I diabetic and her blood sugars are managed well with Farxiga.  After discontinuing the insulin and starting the Farxiga the patient has managed to lose weight over the last year or and has felt much better with this.  She ended up stopping her backing off on exercise after she realized she was not losing weight while still on insulin therapy.  She is planning on getting back into an exercise routine.  She denies any chest pain, palpitations, shortness of breath, orthopnea, near-syncope or syncope, or lower extremity edema.  With the weight loss she is no longer having issues with lower extremity edema and does not require the furosemide anymore.     Prior History:  I saw the patient initially in 3/2021 when she presented for an evaluation of a murmur.  Patient was diagnosed with type 1 diabetes in 8/2020 during a hospitalization after presenting with complaints of increased thirst, polyuria and hyperglycemia.    During her initial office visit Dr. Lee noted a murmur on exam.  It was recommended that she undergo further cardiac work-up.  At the time of her initial office visit the patient reported that since starting insulin she had gained significant amount of weight.    She reported dyspnea on exertion at the time that she attributes to her weight gain.  She reported lower extremity edema only when sitting for long periods of time.  She did have a family history  of TIA and stroke in her father and two-vessel CABG at the age of 48 and her mother.       At that office visit I recommended proceeding with an echocardiogram.  We did discuss the possibility of pursuing a stress test with her risk factors and her history of dyspnea on exertion.  At the time we opted to hold off on the stress test and just pursue an echocardiogram to start.  Her echocardiogram was performed on 3/22/2021 and showed normal left ventricular systolic function and wall motion with an EF of 60 to 65%, grade 2 diastolic dysfunction, mildly dilated right ventricle, mild aortic stenosis, mild tricuspid regurgitation, and a normal right ventricular systolic pressure of 30 mmHg.  I called her to discuss the results she reported that her breathing was doing better so we opted to hold off on any further work-up or change in management.    Review of Systems   Constitutional: Negative for malaise/fatigue.   HENT: Negative for hearing loss, hoarse voice, nosebleeds and sore throat.    Eyes: Negative for pain.   Cardiovascular: Negative for chest pain, claudication, cyanosis, dyspnea on exertion, irregular heartbeat, leg swelling, near-syncope, orthopnea, palpitations, paroxysmal nocturnal dyspnea and syncope.   Respiratory: Negative for shortness of breath and snoring.    Endocrine: Negative for cold intolerance, heat intolerance, polydipsia, polyphagia and polyuria.   Skin: Negative for itching and rash.   Musculoskeletal: Negative for arthritis, falls, joint pain, joint swelling, muscle cramps, muscle weakness and myalgias.   Gastrointestinal: Negative for constipation, diarrhea, dysphagia, heartburn, hematemesis, hematochezia, melena, nausea and vomiting.   Genitourinary: Negative for frequency, hematuria and hesitancy.   Neurological: Negative for excessive daytime sleepiness, dizziness, headaches, light-headedness, numbness and weakness.   Psychiatric/Behavioral: Negative for depression. The patient is not  "nervous/anxious.          Current Outpatient Medications:   •  Alcohol Swabs 70 % pads, Use as directed 4 times day, Disp: 100 each, Rfl: 1  •  Cobalamin Combinations (B12 FOLATE PO), Take  by mouth. b12 spray .. spray on tongue three times week, Disp: , Rfl:   •  Diclofenac Sodium (VOLTAREN) 1 % gel gel, Apply  topically to the appropriate area (knees) as directed 4 times per day, Disp: 300 g, Rfl: 3  •  diphenhydrAMINE (BENADRYL) 25 mg capsule, Take 25 mg by mouth Every 6 (Six) Hours As Needed for Itching., Disp: , Rfl:   •  Farxiga 5 MG tablet tablet, Take 1 tablet by mouth Daily., Disp: 90 tablet, Rfl: 0  •  Ginger, Zingiber officinalis, (GINGER ROOT PO), Take  by mouth. Take one twice weekly, Disp: , Rfl:   •  ibuprofen (ADVIL,MOTRIN) 200 MG tablet, Take 200 mg by mouth Every 6 (Six) Hours As Needed for Mild Pain ., Disp: , Rfl:   •  Insulin Syringe-Needle U-100 (TRUEplus Insulin Syringe) 31G X 5/16\" 1 ML misc, Use 4 times per day as directed., Disp: 400 each, Rfl: 0  •  NON FORMULARY, Sovereign silver  Take one three times weekly, Disp: , Rfl:   •  rosuvastatin (CRESTOR) 10 MG tablet, Take 1 tablet by mouth Daily., Disp: 90 tablet, Rfl: 0    Past Medical History:   Diagnosis Date   • Allergic    • Allergies     to blue dye   • Arthritis    • Back injury    • Diabetes mellitus (HCC) 08/2020    New onset   • Diabetes mellitus type I (HCC)    • Dyslipidemia    • Low back pain    • Neck injury    • Renal calculi     from calcium   • Spinal stenosis    • Systolic murmur    • UTI (urinary tract infection)     history of       Past Surgical History:   Procedure Laterality Date   • D & C AND LAPAROSCOPY     • ECTOPIC PREGNANCY     • WISDOM TOOTH EXTRACTION         Family History   Problem Relation Age of Onset   • Heart disease Mother    • Diabetes Mother    • Arthritis Mother    • Hypertension Father    • Diabetes Father    • Stroke Father    • Hyperlipidemia Father    • No Known Problems Brother    • Heart disease " "Maternal Uncle    • Diabetes Maternal Grandmother    • Diabetes Maternal Grandfather    • Heart disease Maternal Grandfather        Social History     Tobacco Use   • Smoking status: Former     Packs/day: 0.50     Years: 35.00     Pack years: 17.50     Types: Cigarettes   • Smokeless tobacco: Never   • Tobacco comments:     On and Off   Substance Use Topics   • Alcohol use: Yes     Comment: once-twice/yr.--caffeine use   • Drug use: No         ECG 12 Lead    Date/Time: 10/28/2022 4:50 PM  Performed by: Ally Thompson MD  Authorized by: Ally Thompson MD   Comparison: compared with previous ECG   Similar to previous ECG  Rhythm: sinus rhythm               Objective:     Visit Vitals  /74 (BP Location: Left arm, Patient Position: Sitting, Cuff Size: Large Adult)   Pulse 65   Ht 172.7 cm (68\")   Wt 96.9 kg (213 lb 9.6 oz)   SpO2 98%   BMI 32.48 kg/m²         Constitutional:       Appearance: Normal appearance. Well-developed.   Eyes:      General: Lids are normal.      Conjunctiva/sclera: Conjunctivae normal.      Pupils: Pupils are equal, round, and reactive to light.   HENT:      Head: Normocephalic and atraumatic.   Neck:      Vascular: No carotid bruit or JVD.      Lymphadenopathy: No cervical adenopathy.   Pulmonary:      Effort: Pulmonary effort is normal.      Breath sounds: Normal breath sounds.   Cardiovascular:      Normal rate. Regular rhythm.      No gallop.   Pulses:     Radial: 2+ bilaterally.  Edema:     Peripheral edema absent.   Abdominal:      Palpations: Abdomen is soft.   Musculoskeletal:      Cervical back: Full passive range of motion without pain, normal range of motion and neck supple. Skin:     General: Skin is warm and dry.   Neurological:      Mental Status: Alert and oriented to person, place, and time.             Assessment:          Diagnosis Plan   1. Chronic diastolic congestive heart failure (HCC)        2. Nonrheumatic aortic valve stenosis        3. Hyperlipidemia, " unspecified hyperlipidemia type        4. Type 1 diabetes mellitus with other specified complication (HCC)               Plan:         1.  Chronic systolic congestive heart failure.  No evidence of volume overload.  Her edema has resolved following weight loss.  She no longer is requiring furosemide.  2.  Aortic valve stenosis.  In the mild range on her last echocardiogram in 3/2021.  We will plan on rechecking an echocardiogram in another couple of years unless she develops symptoms sooner.  3.  Hyperlipidemia.  On rosuvastatin.  4.  Type 2 diabetes.  Managed by endocrinology.    We will plan on seeing the patient back again in 1 year or sooner if further issues arise.

## 2022-10-31 ENCOUNTER — DOCUMENTATION (OUTPATIENT)
Dept: PHYSICAL THERAPY | Facility: CLINIC | Age: 57
End: 2022-10-31

## 2022-10-31 DIAGNOSIS — M47.819 SPONDYLOSIS WITHOUT MYELOPATHY OR RADICULOPATHY, SITE UNSPECIFIED: ICD-10-CM

## 2022-10-31 DIAGNOSIS — M54.50 LOW BACK PAIN, UNSPECIFIED BACK PAIN LATERALITY, UNSPECIFIED CHRONICITY, UNSPECIFIED WHETHER SCIATICA PRESENT: Primary | ICD-10-CM

## 2022-10-31 NOTE — PROGRESS NOTES
Physical Therapy Discharge Summary    [place]   [address] [place] [address] [id number]  [place] [address]    Patient:Yadira Car  : 1965  Diagnosis/ICD-10 Code: Low back pain, unspecified back pain laterality, unspecified chronicity, unspecified whether sciatica present [M54.50]  Referring Practitioner: No ref. provider found  Date of Initial Visit: 10/31/2022  Today's Date: 10/31/2022  Patient was seen for Visit count could not be calculated. Make sure you are using a visit which is associated with an episode. sessions    Visit Diagnoses:    ICD-10-CM ICD-9-CM   1. Low back pain, unspecified back pain laterality, unspecified chronicity, unspecified whether sciatica present  M54.50 724.2   2. Spondylosis without myelopathy or radiculopathy, site unspecified  M47.819 721.90       Pt was not seen in the clinic today for a formal re-assessment prior to d/c.    Pt failed to attend subsequent therapy sessions after last attended PT appointment, which was her initial evaluation; therefore, pt will be d/c today.      Eriberto Vargas, PT  Physical Therapist  [place] License #: [id number]

## 2022-11-17 ENCOUNTER — TRANSCRIBE ORDERS (OUTPATIENT)
Dept: ADMINISTRATIVE | Facility: HOSPITAL | Age: 57
End: 2022-11-17

## 2022-11-17 DIAGNOSIS — E55.9 VITAMIN D DEFICIENCY DISEASE: ICD-10-CM

## 2022-11-17 DIAGNOSIS — E11.9 DIABETES MELLITUS WITHOUT COMPLICATION: ICD-10-CM

## 2022-11-17 DIAGNOSIS — E78.81 LIPOID DERMATOARTHRITIS: Primary | ICD-10-CM

## 2022-12-10 ENCOUNTER — LAB (OUTPATIENT)
Dept: LAB | Facility: HOSPITAL | Age: 57
End: 2022-12-10

## 2022-12-10 DIAGNOSIS — E11.9 DIABETES MELLITUS WITHOUT COMPLICATION: ICD-10-CM

## 2022-12-10 DIAGNOSIS — E78.81 LIPOID DERMATOARTHRITIS: ICD-10-CM

## 2022-12-10 DIAGNOSIS — E55.9 VITAMIN D DEFICIENCY DISEASE: ICD-10-CM

## 2022-12-10 LAB
25(OH)D3 SERPL-MCNC: 71 NG/ML (ref 30–100)
ALBUMIN SERPL-MCNC: 5.3 G/DL (ref 3.5–5.2)
ALBUMIN UR-MCNC: 16.4 MG/DL
ALBUMIN/GLOB SERPL: 2 G/DL
ALP SERPL-CCNC: 95 U/L (ref 39–117)
ALT SERPL W P-5'-P-CCNC: 19 U/L (ref 1–33)
ANION GAP SERPL CALCULATED.3IONS-SCNC: 14.7 MMOL/L (ref 5–15)
AST SERPL-CCNC: 20 U/L (ref 1–32)
BILIRUB SERPL-MCNC: 0.8 MG/DL (ref 0–1.2)
BUN SERPL-MCNC: 14 MG/DL (ref 6–20)
BUN/CREAT SERPL: 20.3 (ref 7–25)
CALCIUM SPEC-SCNC: 10 MG/DL (ref 8.6–10.5)
CHLORIDE SERPL-SCNC: 102 MMOL/L (ref 98–107)
CHOLEST SERPL-MCNC: 172 MG/DL (ref 0–200)
CO2 SERPL-SCNC: 24.3 MMOL/L (ref 22–29)
CREAT SERPL-MCNC: 0.69 MG/DL (ref 0.57–1)
DEPRECATED RDW RBC AUTO: 41.5 FL (ref 37–54)
EGFRCR SERPLBLD CKD-EPI 2021: 102 ML/MIN/1.73
ERYTHROCYTE [DISTWIDTH] IN BLOOD BY AUTOMATED COUNT: 12.7 % (ref 12.3–15.4)
GLOBULIN UR ELPH-MCNC: 2.6 GM/DL
GLUCOSE SERPL-MCNC: 147 MG/DL (ref 65–99)
HCT VFR BLD AUTO: 47.1 % (ref 34–46.6)
HDLC SERPL-MCNC: 44 MG/DL (ref 40–60)
HGB BLD-MCNC: 15.8 G/DL (ref 12–15.9)
LDLC SERPL CALC-MCNC: 91 MG/DL (ref 0–100)
LDLC/HDLC SERPL: 1.92 {RATIO}
MCH RBC QN AUTO: 29.6 PG (ref 26.6–33)
MCHC RBC AUTO-ENTMCNC: 33.5 G/DL (ref 31.5–35.7)
MCV RBC AUTO: 88.2 FL (ref 79–97)
PLATELET # BLD AUTO: 299 10*3/MM3 (ref 140–450)
PMV BLD AUTO: 10.7 FL (ref 6–12)
POTASSIUM SERPL-SCNC: 4.4 MMOL/L (ref 3.5–5.2)
PROT SERPL-MCNC: 7.9 G/DL (ref 6–8.5)
RBC # BLD AUTO: 5.34 10*6/MM3 (ref 3.77–5.28)
SODIUM SERPL-SCNC: 141 MMOL/L (ref 136–145)
TRIGL SERPL-MCNC: 218 MG/DL (ref 0–150)
VLDLC SERPL-MCNC: 37 MG/DL (ref 5–40)
WBC NRBC COR # BLD: 9.11 10*3/MM3 (ref 3.4–10.8)

## 2022-12-10 PROCEDURE — 85027 COMPLETE CBC AUTOMATED: CPT

## 2022-12-10 PROCEDURE — 36415 COLL VENOUS BLD VENIPUNCTURE: CPT

## 2022-12-10 PROCEDURE — 82306 VITAMIN D 25 HYDROXY: CPT

## 2022-12-10 PROCEDURE — 82043 UR ALBUMIN QUANTITATIVE: CPT

## 2022-12-10 PROCEDURE — 80061 LIPID PANEL: CPT

## 2022-12-10 PROCEDURE — 80053 COMPREHEN METABOLIC PANEL: CPT

## 2022-12-10 PROCEDURE — 83036 HEMOGLOBIN GLYCOSYLATED A1C: CPT

## 2022-12-12 LAB — HBA1C MFR BLD: 7.1 % (ref 3.5–5.6)

## 2023-02-02 ENCOUNTER — PATIENT OUTREACH (OUTPATIENT)
Dept: CASE MANAGEMENT | Facility: OTHER | Age: 58
End: 2023-02-02
Payer: COMMERCIAL

## 2023-02-02 NOTE — OUTREACH NOTE
AMBULATORY CASE MANAGEMENT NOTE    Name and Relationship of Patient/Support Person: Yadira Car    Received incoming email from patient to update CM on health. Reports she feels great. Is no longer considered type 1 diabetes, is now diagnosed as type 2. Is off insulin since September 2022 and taking Farxiga 10mg a day.  Has lost 31 pounds since no longer taking the insulin. Has not had her a1c checked since taking Farxiga.     Patient exhibits strong sense of health self-management and adequate support system. Patient has met care plan goals, all care gaps have been addressed. The patient's annual wellness visit is scheduled. [employer] activation is completed. Quality Measures reviewed, and no unaddressed patient reported concerns.  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     LINCOLN WOOD  Ambulatory Case Management    2/2/2023, 15:54 EST

## 2023-03-31 ENCOUNTER — HOSPITAL ENCOUNTER (OUTPATIENT)
Dept: MAMMOGRAPHY | Facility: HOSPITAL | Age: 58
Discharge: HOME | End: 2023-03-31
Payer: COMMERCIAL

## 2023-03-31 ENCOUNTER — HOSPITAL ENCOUNTER (OUTPATIENT)
Dept: BONE DENSITY | Facility: HOSPITAL | Age: 58
Discharge: HOME | End: 2023-03-31
Payer: COMMERCIAL

## 2023-03-31 DIAGNOSIS — N95.9 MENOPAUSAL AND POSTMENOPAUSAL DISORDER: ICD-10-CM

## 2023-03-31 DIAGNOSIS — Z12.31 SCREENING MAMMOGRAM FOR BREAST CANCER: ICD-10-CM

## 2023-03-31 PROCEDURE — 77080 DXA BONE DENSITY AXIAL: CPT

## 2023-03-31 PROCEDURE — 77067 SCR MAMMO BI INCL CAD: CPT

## 2023-03-31 PROCEDURE — 77063 BREAST TOMOSYNTHESIS BI: CPT

## 2023-06-17 ENCOUNTER — LAB (OUTPATIENT)
Dept: LAB | Facility: HOSPITAL | Age: 58
End: 2023-06-17
Payer: COMMERCIAL

## 2023-06-17 DIAGNOSIS — Z00.00 ROUTINE GENERAL MEDICAL EXAMINATION AT A HEALTH CARE FACILITY: ICD-10-CM

## 2023-06-17 DIAGNOSIS — E55.9 VITAMIN D INSUFFICIENCY: ICD-10-CM

## 2023-06-17 DIAGNOSIS — M54.50 LOW BACK PAIN, UNSPECIFIED BACK PAIN LATERALITY, UNSPECIFIED CHRONICITY, UNSPECIFIED WHETHER SCIATICA PRESENT: ICD-10-CM

## 2023-06-17 DIAGNOSIS — R60.9 EDEMA, UNSPECIFIED TYPE: ICD-10-CM

## 2023-06-17 DIAGNOSIS — E11.9 DIABETES MELLITUS WITHOUT COMPLICATION: ICD-10-CM

## 2023-06-17 DIAGNOSIS — E78.81 LIPOID DERMATOARTHRITIS: ICD-10-CM

## 2023-06-17 LAB
25(OH)D3 SERPL-MCNC: 65.5 NG/ML (ref 30–100)
ALBUMIN SERPL-MCNC: 4.8 G/DL (ref 3.5–5.2)
ALBUMIN UR-MCNC: 2 MG/DL
ALBUMIN/GLOB SERPL: 2 G/DL
ALP SERPL-CCNC: 89 U/L (ref 39–117)
ALT SERPL W P-5'-P-CCNC: 16 U/L (ref 1–33)
ANION GAP SERPL CALCULATED.3IONS-SCNC: 11.9 MMOL/L (ref 5–15)
AST SERPL-CCNC: 18 U/L (ref 1–32)
BASOPHILS # BLD AUTO: 0.03 10*3/MM3 (ref 0–0.2)
BASOPHILS NFR BLD AUTO: 0.3 % (ref 0–1.5)
BILIRUB SERPL-MCNC: 0.5 MG/DL (ref 0–1.2)
BUN SERPL-MCNC: 18 MG/DL (ref 6–20)
BUN/CREAT SERPL: 27.7 (ref 7–25)
CALCIUM SPEC-SCNC: 10.5 MG/DL (ref 8.6–10.5)
CHLORIDE SERPL-SCNC: 102 MMOL/L (ref 98–107)
CHOLEST SERPL-MCNC: 164 MG/DL (ref 0–200)
CO2 SERPL-SCNC: 25.1 MMOL/L (ref 22–29)
CREAT SERPL-MCNC: 0.65 MG/DL (ref 0.57–1)
DEPRECATED RDW RBC AUTO: 41.1 FL (ref 37–54)
EGFRCR SERPLBLD CKD-EPI 2021: 103.5 ML/MIN/1.73
EOSINOPHIL # BLD AUTO: 0.21 10*3/MM3 (ref 0–0.4)
EOSINOPHIL NFR BLD AUTO: 2.4 % (ref 0.3–6.2)
ERYTHROCYTE [DISTWIDTH] IN BLOOD BY AUTOMATED COUNT: 12.6 % (ref 12.3–15.4)
GLOBULIN UR ELPH-MCNC: 2.4 GM/DL
GLUCOSE SERPL-MCNC: 169 MG/DL (ref 65–99)
HBA1C MFR BLD: 8.5 % (ref 4.8–5.6)
HCT VFR BLD AUTO: 45.4 % (ref 34–46.6)
HDLC SERPL-MCNC: 48 MG/DL (ref 40–60)
HGB BLD-MCNC: 15.5 G/DL (ref 12–15.9)
IMM GRANULOCYTES # BLD AUTO: 0.04 10*3/MM3 (ref 0–0.05)
IMM GRANULOCYTES NFR BLD AUTO: 0.5 % (ref 0–0.5)
LDLC SERPL CALC-MCNC: 82 MG/DL (ref 0–100)
LDLC/HDLC SERPL: 1.58 {RATIO}
LYMPHOCYTES # BLD AUTO: 1.71 10*3/MM3 (ref 0.7–3.1)
LYMPHOCYTES NFR BLD AUTO: 19.7 % (ref 19.6–45.3)
MCH RBC QN AUTO: 30.5 PG (ref 26.6–33)
MCHC RBC AUTO-ENTMCNC: 34.1 G/DL (ref 31.5–35.7)
MCV RBC AUTO: 89.2 FL (ref 79–97)
MONOCYTES # BLD AUTO: 0.52 10*3/MM3 (ref 0.1–0.9)
MONOCYTES NFR BLD AUTO: 6 % (ref 5–12)
NEUTROPHILS NFR BLD AUTO: 6.16 10*3/MM3 (ref 1.7–7)
NEUTROPHILS NFR BLD AUTO: 71.1 % (ref 42.7–76)
NRBC BLD AUTO-RTO: 0 /100 WBC (ref 0–0.2)
PLATELET # BLD AUTO: 263 10*3/MM3 (ref 140–450)
PMV BLD AUTO: 10.6 FL (ref 6–12)
POTASSIUM SERPL-SCNC: 5.2 MMOL/L (ref 3.5–5.2)
PROT SERPL-MCNC: 7.2 G/DL (ref 6–8.5)
RBC # BLD AUTO: 5.09 10*6/MM3 (ref 3.77–5.28)
SODIUM SERPL-SCNC: 139 MMOL/L (ref 136–145)
TRIGL SERPL-MCNC: 201 MG/DL (ref 0–150)
TSH SERPL DL<=0.05 MIU/L-ACNC: 1.27 UIU/ML (ref 0.27–4.2)
VLDLC SERPL-MCNC: 34 MG/DL (ref 5–40)
WBC NRBC COR # BLD: 8.67 10*3/MM3 (ref 3.4–10.8)

## 2023-06-17 PROCEDURE — 36415 COLL VENOUS BLD VENIPUNCTURE: CPT

## 2023-06-17 PROCEDURE — 82043 UR ALBUMIN QUANTITATIVE: CPT

## 2023-06-17 PROCEDURE — 83036 HEMOGLOBIN GLYCOSYLATED A1C: CPT

## 2023-06-17 PROCEDURE — 80050 GENERAL HEALTH PANEL: CPT

## 2023-06-17 PROCEDURE — 82306 VITAMIN D 25 HYDROXY: CPT

## 2023-06-17 PROCEDURE — 80061 LIPID PANEL: CPT

## 2023-10-02 ENCOUNTER — TRANSCRIBE ORDERS (OUTPATIENT)
Dept: LAB | Facility: HOSPITAL | Age: 58
End: 2023-10-02
Payer: COMMERCIAL

## 2023-10-02 DIAGNOSIS — E11.9 DIABETES MELLITUS WITHOUT COMPLICATION: Primary | ICD-10-CM

## 2023-10-05 ENCOUNTER — LAB (OUTPATIENT)
Dept: LAB | Facility: HOSPITAL | Age: 58
End: 2023-10-05
Payer: COMMERCIAL

## 2023-10-05 DIAGNOSIS — E11.9 DIABETES MELLITUS WITHOUT COMPLICATION: ICD-10-CM

## 2023-10-05 LAB
ALBUMIN SERPL-MCNC: 5 G/DL (ref 3.5–5.2)
ALBUMIN/GLOB SERPL: 2 G/DL
ALP SERPL-CCNC: 91 U/L (ref 39–117)
ALT SERPL W P-5'-P-CCNC: 15 U/L (ref 1–33)
ANION GAP SERPL CALCULATED.3IONS-SCNC: 14 MMOL/L (ref 5–15)
AST SERPL-CCNC: 14 U/L (ref 1–32)
BILIRUB SERPL-MCNC: 0.4 MG/DL (ref 0–1.2)
BUN SERPL-MCNC: 17 MG/DL (ref 6–20)
BUN/CREAT SERPL: 25 (ref 7–25)
CALCIUM SPEC-SCNC: 10.8 MG/DL (ref 8.6–10.5)
CHLORIDE SERPL-SCNC: 104 MMOL/L (ref 98–107)
CO2 SERPL-SCNC: 24 MMOL/L (ref 22–29)
CREAT SERPL-MCNC: 0.68 MG/DL (ref 0.57–1)
EGFRCR SERPLBLD CKD-EPI 2021: 101.7 ML/MIN/1.73
GLOBULIN UR ELPH-MCNC: 2.5 GM/DL
GLUCOSE SERPL-MCNC: 233 MG/DL (ref 65–99)
HBA1C MFR BLD: 9.3 % (ref 4.8–5.6)
POTASSIUM SERPL-SCNC: 4.8 MMOL/L (ref 3.5–5.2)
PROT SERPL-MCNC: 7.5 G/DL (ref 6–8.5)
SODIUM SERPL-SCNC: 142 MMOL/L (ref 136–145)

## 2023-10-05 PROCEDURE — 83036 HEMOGLOBIN GLYCOSYLATED A1C: CPT

## 2023-10-05 PROCEDURE — 36415 COLL VENOUS BLD VENIPUNCTURE: CPT

## 2023-10-05 PROCEDURE — 80053 COMPREHEN METABOLIC PANEL: CPT

## 2023-11-29 ENCOUNTER — TRANSCRIBE ORDERS (OUTPATIENT)
Dept: ADMINISTRATIVE | Facility: HOSPITAL | Age: 58
End: 2023-11-29
Payer: COMMERCIAL

## 2023-11-29 DIAGNOSIS — Z00.00 ROUTINE GENERAL MEDICAL EXAMINATION AT A HEALTH CARE FACILITY: ICD-10-CM

## 2023-11-29 DIAGNOSIS — E11.9 DIABETES MELLITUS WITHOUT COMPLICATION: Primary | ICD-10-CM

## 2023-11-29 DIAGNOSIS — E55.9 VITAMIN D DEFICIENCY, UNSPECIFIED: ICD-10-CM

## 2023-11-29 DIAGNOSIS — R60.9 BODY FLUID RETENTION: ICD-10-CM

## 2023-11-29 DIAGNOSIS — E78.81 LIPOID DERMATOARTHRITIS: ICD-10-CM

## 2023-12-01 ENCOUNTER — TELEPHONE (OUTPATIENT)
Dept: CARDIOLOGY | Facility: CLINIC | Age: 58
End: 2023-12-01

## 2023-12-01 NOTE — TELEPHONE ENCOUNTER
"Caller: Yadira Car    Relationship to patient: Self    Best call back number: [phone] (home)     Chief complaint: HAVING CHEST \"TWITCHES\"- HAVING 1/2 EPISODES PER WEEK (LASTS BETWEEN 2-10 MINUTES)    Type of visit: FOLLOW UP    Requested date: ASAP     If rescheduling, when is the original appointment: 12.7.23     Additional notes: PATIENT WAS TOLD TO CALL AND RESCHEDULE HER APPOINTMENT BUT NEXT AVAILABLE ISN'T UNTIL 1.11.24, BUT SHE WANTS TO BEEN SEEN SOONER AND CAN NOT WAIT AND ONLY WANTS TO SEE DR SUTTON. PLEASE CALL PATIENT BACK ON WHEN WE CAN GET HER IN FOR AN APPOINTMENT, THANK YOU!  "

## 2023-12-02 ENCOUNTER — LAB (OUTPATIENT)
Dept: LAB | Facility: HOSPITAL | Age: 58
End: 2023-12-02
Payer: COMMERCIAL

## 2023-12-02 DIAGNOSIS — Z00.00 ROUTINE GENERAL MEDICAL EXAMINATION AT A HEALTH CARE FACILITY: ICD-10-CM

## 2023-12-02 DIAGNOSIS — R60.9 BODY FLUID RETENTION: ICD-10-CM

## 2023-12-02 DIAGNOSIS — E78.81 LIPOID DERMATOARTHRITIS: ICD-10-CM

## 2023-12-02 DIAGNOSIS — E11.9 DIABETES MELLITUS WITHOUT COMPLICATION: ICD-10-CM

## 2023-12-02 DIAGNOSIS — E55.9 VITAMIN D DEFICIENCY, UNSPECIFIED: ICD-10-CM

## 2023-12-02 LAB
ALBUMIN SERPL-MCNC: 5.1 G/DL (ref 3.5–5.2)
ALBUMIN UR-MCNC: 3.3 MG/DL
ALBUMIN/GLOB SERPL: 2.6 G/DL
ALP SERPL-CCNC: 81 U/L (ref 39–117)
ALT SERPL W P-5'-P-CCNC: 15 U/L (ref 1–33)
ANION GAP SERPL CALCULATED.3IONS-SCNC: 11 MMOL/L (ref 5–15)
AST SERPL-CCNC: 13 U/L (ref 1–32)
BASOPHILS # BLD AUTO: 0.03 10*3/MM3 (ref 0–0.2)
BASOPHILS NFR BLD AUTO: 0.4 % (ref 0–1.5)
BILIRUB SERPL-MCNC: 0.7 MG/DL (ref 0–1.2)
BUN SERPL-MCNC: 16 MG/DL (ref 6–20)
BUN/CREAT SERPL: 23.9 (ref 7–25)
CALCIUM SPEC-SCNC: 9.9 MG/DL (ref 8.6–10.5)
CHLORIDE SERPL-SCNC: 102 MMOL/L (ref 98–107)
CHOLEST SERPL-MCNC: 143 MG/DL (ref 0–200)
CO2 SERPL-SCNC: 26 MMOL/L (ref 22–29)
CREAT SERPL-MCNC: 0.67 MG/DL (ref 0.57–1)
CREAT UR-MCNC: 75.7 MG/DL
DEPRECATED RDW RBC AUTO: 43.9 FL (ref 37–54)
EGFRCR SERPLBLD CKD-EPI 2021: 102.1 ML/MIN/1.73
EOSINOPHIL # BLD AUTO: 0.27 10*3/MM3 (ref 0–0.4)
EOSINOPHIL NFR BLD AUTO: 3.3 % (ref 0.3–6.2)
ERYTHROCYTE [DISTWIDTH] IN BLOOD BY AUTOMATED COUNT: 13 % (ref 12.3–15.4)
GLOBULIN UR ELPH-MCNC: 2 GM/DL
GLUCOSE SERPL-MCNC: 146 MG/DL (ref 65–99)
HBA1C MFR BLD: 8.2 % (ref 4.8–5.6)
HCT VFR BLD AUTO: 47.6 % (ref 34–46.6)
HDLC SERPL-MCNC: 53 MG/DL (ref 40–60)
HGB BLD-MCNC: 15.7 G/DL (ref 12–15.9)
IMM GRANULOCYTES # BLD AUTO: 0.05 10*3/MM3 (ref 0–0.05)
IMM GRANULOCYTES NFR BLD AUTO: 0.6 % (ref 0–0.5)
LDLC SERPL CALC-MCNC: 61 MG/DL (ref 0–100)
LDLC/HDLC SERPL: 1.03 {RATIO}
LYMPHOCYTES # BLD AUTO: 2.55 10*3/MM3 (ref 0.7–3.1)
LYMPHOCYTES NFR BLD AUTO: 31.1 % (ref 19.6–45.3)
MCH RBC QN AUTO: 30.1 PG (ref 26.6–33)
MCHC RBC AUTO-ENTMCNC: 33 G/DL (ref 31.5–35.7)
MCV RBC AUTO: 91.4 FL (ref 79–97)
MICROALBUMIN/CREAT UR: 43.6 MG/G (ref 0–29)
MONOCYTES # BLD AUTO: 0.48 10*3/MM3 (ref 0.1–0.9)
MONOCYTES NFR BLD AUTO: 5.9 % (ref 5–12)
NEUTROPHILS NFR BLD AUTO: 4.82 10*3/MM3 (ref 1.7–7)
NEUTROPHILS NFR BLD AUTO: 58.7 % (ref 42.7–76)
NRBC BLD AUTO-RTO: 0 /100 WBC (ref 0–0.2)
PLATELET # BLD AUTO: 293 10*3/MM3 (ref 140–450)
PMV BLD AUTO: 10.5 FL (ref 6–12)
POTASSIUM SERPL-SCNC: 4.2 MMOL/L (ref 3.5–5.2)
PROT SERPL-MCNC: 7.1 G/DL (ref 6–8.5)
RBC # BLD AUTO: 5.21 10*6/MM3 (ref 3.77–5.28)
SODIUM SERPL-SCNC: 139 MMOL/L (ref 136–145)
TRIGL SERPL-MCNC: 177 MG/DL (ref 0–150)
TSH SERPL DL<=0.05 MIU/L-ACNC: 1.2 UIU/ML (ref 0.27–4.2)
VLDLC SERPL-MCNC: 29 MG/DL (ref 5–40)
WBC NRBC COR # BLD AUTO: 8.2 10*3/MM3 (ref 3.4–10.8)

## 2023-12-02 PROCEDURE — 82652 VIT D 1 25-DIHYDROXY: CPT

## 2023-12-02 PROCEDURE — 80050 GENERAL HEALTH PANEL: CPT

## 2023-12-02 PROCEDURE — 80061 LIPID PANEL: CPT

## 2023-12-02 PROCEDURE — 82043 UR ALBUMIN QUANTITATIVE: CPT

## 2023-12-02 PROCEDURE — 82570 ASSAY OF URINE CREATININE: CPT

## 2023-12-02 PROCEDURE — 83036 HEMOGLOBIN GLYCOSYLATED A1C: CPT

## 2023-12-02 PROCEDURE — 36415 COLL VENOUS BLD VENIPUNCTURE: CPT

## 2023-12-05 LAB — 1,25(OH)2D SERPL-MCNC: 39.5 PG/ML (ref 24.8–81.5)

## 2023-12-14 ENCOUNTER — OFFICE VISIT (OUTPATIENT)
Age: 58
End: 2023-12-14
Payer: COMMERCIAL

## 2023-12-14 VITALS
HEART RATE: 69 BPM | HEIGHT: 68 IN | BODY MASS INDEX: 28.64 KG/M2 | SYSTOLIC BLOOD PRESSURE: 115 MMHG | OXYGEN SATURATION: 97 % | WEIGHT: 189 LBS | DIASTOLIC BLOOD PRESSURE: 70 MMHG

## 2023-12-14 DIAGNOSIS — I50.32 CHRONIC DIASTOLIC CONGESTIVE HEART FAILURE: Primary | ICD-10-CM

## 2023-12-14 DIAGNOSIS — R07.89 CHEST PAIN, ATYPICAL: ICD-10-CM

## 2023-12-14 DIAGNOSIS — I35.0 NONRHEUMATIC AORTIC VALVE STENOSIS: ICD-10-CM

## 2023-12-14 DIAGNOSIS — E78.5 HYPERLIPIDEMIA, UNSPECIFIED HYPERLIPIDEMIA TYPE: ICD-10-CM

## 2023-12-14 PROCEDURE — 99214 OFFICE O/P EST MOD 30 MIN: CPT | Performed by: INTERNAL MEDICINE

## 2023-12-14 PROCEDURE — 93000 ELECTROCARDIOGRAM COMPLETE: CPT | Performed by: INTERNAL MEDICINE

## 2023-12-14 NOTE — PROGRESS NOTES
Subjective:     Encounter Date:12/14/2023      Patient ID: Yadira Car is a 58 y.o. female.    Chief Complaint:  History of Present Illness    This is a 58-year-old with diabetes, obesity, hyperlipidemia, aortic stenosis, chronic diastolic congestive heart failure, who presents for follow up.      She presents today for annual follow-up.  At her last office visit in 10/2022 she reported that she was feeling really well.  Her pancreas started functioning again and she was able to come off of insulin.  She was now considered a type II diabetic and her blood sugars were just managed with Farxiga.  The patient also was able to lose weight and was feeling much better.  However she had not been exercising but was planning to resume.  She denies any further lower extremity swelling with the weight loss and did not require the furosemide anymore.  The plan was to repeat an echocardiogram 2 years from her last in 3/2021.  Otherwise no changes were made to her management.      She presents today for follow-up.  This is her first office visit since 10/2022.  She reports that over the last few weeks she has been having episodes of a spasm-like pain in the left side of her chest that will last 2 to 3 minutes at a time.  This occurs about 2-3 times a week.  Usually at rest and not with activity.  She denies any dyspnea exertion, palpitations, orthopnea, near-syncope or syncope.  She reports some lower extremity edema at the end of the workday since she spends most of the majority of the day seated.     Prior History:  I saw the patient initially in 3/2021 when she presented for an evaluation of a murmur.  Patient was diagnosed with type 1 diabetes in 8/2020 during a hospitalization after presenting with complaints of increased thirst, polyuria and hyperglycemia.    During her initial office visit Dr. Lee noted a murmur on exam.  It was recommended that she undergo further cardiac work-up.  At the time of her initial  office visit the patient reported that since starting insulin she had gained significant amount of weight.    She reported dyspnea on exertion at the time that she attributes to her weight gain.  She reported lower extremity edema only when sitting for long periods of time.  She did have a family history of TIA and stroke in her father and two-vessel CABG at the age of 48.     At that office visit I recommended proceeding with an echocardiogram.  We did discuss the possibility of pursuing a stress test with her risk factors and her history of dyspnea on exertion.  At the time we opted to hold off on the stress test and just pursue an echocardiogram to start.  Her echocardiogram was performed on 3/22/2021 and showed normal left ventricular systolic function and wall motion with an EF of 60 to 65%, grade 2 diastolic dysfunction, mildly dilated right ventricle, mild aortic stenosis, mild tricuspid regurgitation, and a normal right ventricular systolic pressure of 30 mmHg.  I called her to discuss the results she reported that her breathing was doing better so we opted to hold off on any further work-up or change in management.    Review of Systems   Constitutional: Negative for malaise/fatigue.   HENT:  Negative for hearing loss, hoarse voice, nosebleeds and sore throat.    Eyes:  Negative for pain.   Cardiovascular:  Positive for chest pain and leg swelling. Negative for claudication, cyanosis, dyspnea on exertion, irregular heartbeat, near-syncope, orthopnea, palpitations, paroxysmal nocturnal dyspnea and syncope.   Respiratory:  Negative for shortness of breath and snoring.    Endocrine: Negative for cold intolerance, heat intolerance, polydipsia, polyphagia and polyuria.   Skin:  Negative for itching and rash.   Musculoskeletal:  Negative for arthritis, falls, joint pain, joint swelling, muscle cramps, muscle weakness and myalgias.   Gastrointestinal:  Negative for constipation, diarrhea, dysphagia, heartburn,  hematemesis, hematochezia, melena, nausea and vomiting.   Genitourinary:  Negative for frequency, hematuria and hesitancy.   Neurological:  Negative for excessive daytime sleepiness, dizziness, headaches, light-headedness, numbness and weakness.   Psychiatric/Behavioral:  Negative for depression. The patient is not nervous/anxious.          Current Outpatient Medications:     Cobalamin Combinations (B12 FOLATE PO), Take  by mouth. b12 spray .. spray on tongue three times week, Disp: , Rfl:     Diclofenac Sodium (Voltaren) 1 % gel gel, Apply 1 g topically to the appropriate area as directed 4 (Four) Times a Day., Disp: 300 g, Rfl: 3    diphenhydrAMINE (BENADRYL) 25 mg capsule, Take 1 capsule by mouth Every 6 (Six) Hours As Needed for Itching., Disp: , Rfl:     Ginger, Zingiber officinalis, (GINGER ROOT PO), Take  by mouth. 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    Diabetes mellitus 08/2020    New onset    Diabetes mellitus type I     Dyslipidemia     Low back pain     Neck injury     Renal calculi     from calcium    Spinal stenosis     Systolic murmur     UTI (urinary tract infection)     history of       Past Surgical History:   Procedure Laterality Date    D & C AND LAPAROSCOPY      ECTOPIC PREGNANCY      WISDOM TOOTH EXTRACTION         Family History   Problem Relation Age of Onset    Heart disease Mother     Diabetes Mother     Arthritis Mother     Supraventricular  "tachycardia Mother     Atrial fibrillation Mother     Hypertension Father     Diabetes Father     Stroke Father     Hyperlipidemia Father     No Known Problems Brother     Heart disease Maternal Uncle     Diabetes Maternal Grandmother     Diabetes Maternal Grandfather     Heart disease Maternal Grandfather        Social History     Tobacco Use    Smoking status: Former     Packs/day: 0.50     Years: 35.00     Additional pack years: 0.00     Total pack years: 17.50     Types: Cigarettes    Smokeless tobacco: Never    Tobacco comments:     On and Off   Substance Use Topics    Alcohol use: Yes     Comment: once-twice/yr.--caffeine use    Drug use: No         ECG 12 Lead    Date/Time: 12/14/2023 1:52 PM  Performed by: Ally Thompson MD    Authorized by: Ally Thompson MD  Comparison: compared with previous ECG   Similar to previous ECG  Rhythm: sinus rhythm             Objective:     Visit Vitals  /70 (BP Location: Left arm, Patient Position: Sitting, Cuff Size: Adult)   Pulse 69   Ht 172.7 cm (68\")   Wt 85.7 kg (189 lb)   SpO2 97%   BMI 28.74 kg/m²         Constitutional:       Appearance: Normal appearance. Well-developed.   Eyes:      General: Lids are normal.      Conjunctiva/sclera: Conjunctivae normal.      Pupils: Pupils are equal, round, and reactive to light.   HENT:      Head: Normocephalic and atraumatic.   Neck:      Vascular: No carotid bruit or JVD.      Lymphadenopathy: No cervical adenopathy.   Pulmonary:      Effort: Pulmonary effort is normal.      Breath sounds: Normal breath sounds.   Cardiovascular:      Normal rate. Regular rhythm.      No gallop.    Pulses:     Radial: 2+ bilaterally.  Edema:     Peripheral edema absent.   Abdominal:      Palpations: Abdomen is soft.   Musculoskeletal:      Cervical back: Full passive range of motion without pain, normal range of motion and neck supple. Skin:     General: Skin is warm and dry.   Neurological:      Mental Status: Alert and oriented to " person, place, and time.             Assessment:          Diagnosis Plan   1. Chronic diastolic congestive heart failure        2. Hyperlipidemia, unspecified hyperlipidemia type        3. Nonrheumatic aortic valve stenosis  Adult Stress Echo W/ Cont or Stress Agent if Necessary Per Protocol      4. Chest pain, atypical  Adult Stress Echo W/ Cont or Stress Agent if Necessary Per Protocol             Plan:       1.  Chest pain.  Atypical sounding.  EKG is unremarkable.  However she does have significant risk factors including hyperlipidemia and a family history of premature coronary artery disease.  Recommend proceeding with a stress echocardiogram.  If this looks okay may consider monitor placement to rule out an arrhythmia although I think her symptoms are atypical for this also.  2.  Aortic valve stenosis.  Mild on the last echocardiogram.  Plan for echocardiogram as above.  3.  Hyperlipidemia.  On rosuvastatin.    I will call and discuss results of the stress echocardiogram determine further recommendations based on those results.

## 2024-01-02 ENCOUNTER — TELEPHONE (OUTPATIENT)
Dept: CARDIOLOGY | Facility: CLINIC | Age: 59
End: 2024-01-02
Payer: COMMERCIAL

## 2024-01-02 DIAGNOSIS — R07.9 CHEST PAIN, UNSPECIFIED TYPE: Primary | ICD-10-CM

## 2024-01-02 DIAGNOSIS — I35.0 NONRHEUMATIC AORTIC VALVE STENOSIS: ICD-10-CM

## 2024-01-02 NOTE — TELEPHONE ENCOUNTER
LVM with patient telling them the details of their stress echo.  Do not eat, drink, smoke (this includes e-cigarettes), chew tobacco or gum 4 hours prior to the test.  No caffeine or chocolate 24 hours prior to your test. This includes coffee, tea, soda, all decaffeinated beverages, cappuccino flavorings or any energy drinks. Also, told them to look at the list of medications on [facility] or the list we gave them in office to make sure they do not have to hold anything for 24 hours.

## 2024-01-02 NOTE — TELEPHONE ENCOUNTER
I am just going to order this as a separate treadmill stress and echocardiogram.  Please see that the stress echocardiogram order is canceled because I cannot discontinue it myself.  Please see if we can still schedule her for the treadmill stress on the echocardiogram for tomorrow and place of the stress echocardiogram.

## 2024-01-02 NOTE — TELEPHONE ENCOUNTER
[facility] [place]  Cc: Radha Crandall; Giovanna Mejia; Daly June All:    The patient is scheduled for a stress echo on 01/03/2024.  Unfortunately her insurance company has denied her services with the following clinical rationale not being met:    Your doctor is checking you for heart disease. Your doctor ordered a special heart test. This test measures blood flow to the heart. This test should be used when you cannot walk on a treadmill for reasons other than being much heavier than normal for your height. We reviewed the notes we have. The notes do not show that you cannot walk on a treadmill. Based on the information we have, this test is not medically necessary. We used [facility] Medical Benefits Management Clinical Guideline titled Imaging of the Heart, Stress Echocardiography to make this decision. You may view this guideline at [email or web address].    Clinicals that were forwarded at the time of authorization request:    Med List  Progress note for [place] 12/14/2023  EKG 12/14/2023  Echocardiogram 03/22/2021    A peer to peer is not being offered, but the provider can call [facility] to discuss the case with physician reviewer if desired   Please call [phone] for clinical review  *Choose 1 for provider  then  *Choose 1 for diagnostic imaging  Then  *Choose 2 to speak with physician reviewer    However, the provider can also choose alternative testing if appropriate.   Please advise

## 2024-01-02 NOTE — TELEPHONE ENCOUNTER
Please see first message from Heather in precert.      I called Ree, stress echo case is closed and denied.  It is open for reconsideration with peer to peer to be completed by TIFFANY BAXTER MD.  We have 10 days from denial day 12/29 to complete peer to peer.    It does not need to be scheduled.  If you see the first message from Heather, the phone number and directions are in that message.      As of now, testing is scheduled for tomorrow.    The reference number is [id number].    Thanks  Lucille Bryant RN  [facility] T[facility]  01/02/24 11:53 EST

## 2024-01-03 ENCOUNTER — HOSPITAL ENCOUNTER (OUTPATIENT)
Dept: CARDIOLOGY | Facility: HOSPITAL | Age: 59
Discharge: HOME | End: 2024-01-03
Payer: COMMERCIAL

## 2024-01-03 VITALS
WEIGHT: 189 LBS | SYSTOLIC BLOOD PRESSURE: 132 MMHG | DIASTOLIC BLOOD PRESSURE: 80 MMHG | HEART RATE: 69 BPM | BODY MASS INDEX: 28.64 KG/M2 | HEIGHT: 68 IN | OXYGEN SATURATION: 98 %

## 2024-01-03 DIAGNOSIS — R07.9 CHEST PAIN, UNSPECIFIED TYPE: ICD-10-CM

## 2024-01-03 DIAGNOSIS — I35.0 NONRHEUMATIC AORTIC VALVE STENOSIS: ICD-10-CM

## 2024-01-03 LAB
AORTIC ARCH: 3 CM
AORTIC DIMENSIONLESS INDEX: 0.5 (DI)
ASCENDING AORTA: 2.9 CM
BH CV ECHO MEAS - ACS: 0.93 CM
BH CV ECHO MEAS - AO MAX PG: 16.8 MMHG
BH CV ECHO MEAS - AO MEAN PG: 8.6 MMHG
BH CV ECHO MEAS - AO ROOT DIAM: 2.44 CM
BH CV ECHO MEAS - AO V2 MAX: 205.2 CM/SEC
BH CV ECHO MEAS - AO V2 VTI: 49.2 CM
BH CV ECHO MEAS - AVA(I,D): 1.23 CM2
BH CV ECHO MEAS - EDV(CUBED): 74.1 ML
BH CV ECHO MEAS - EDV(MOD-SP2): 39 ML
BH CV ECHO MEAS - EDV(MOD-SP4): 42 ML
BH CV ECHO MEAS - EF(MOD-BP): 61.6 %
BH CV ECHO MEAS - EF(MOD-SP2): 61.5 %
BH CV ECHO MEAS - EF(MOD-SP4): 59.5 %
BH CV ECHO MEAS - ESV(CUBED): 23.8 ML
BH CV ECHO MEAS - ESV(MOD-SP2): 15 ML
BH CV ECHO MEAS - ESV(MOD-SP4): 17 ML
BH CV ECHO MEAS - FS: 31.5 %
BH CV ECHO MEAS - IVS/LVPW: 1 CM
BH CV ECHO MEAS - IVSD: 0.7 CM
BH CV ECHO MEAS - LAT PEAK E' VEL: 7.4 CM/SEC
BH CV ECHO MEAS - LV DIASTOLIC VOL/BSA (35-75): 21 CM2
BH CV ECHO MEAS - LV MASS(C)D: 85.1 GRAMS
BH CV ECHO MEAS - LV MAX PG: 4 MMHG
BH CV ECHO MEAS - LV MEAN PG: 2 MMHG
BH CV ECHO MEAS - LV SYSTOLIC VOL/BSA (12-30): 8.5 CM2
BH CV ECHO MEAS - LV V1 MAX: 99.5 CM/SEC
BH CV ECHO MEAS - LV V1 VTI: 22.5 CM
BH CV ECHO MEAS - LVIDD: 4.2 CM
BH CV ECHO MEAS - LVIDS: 2.9 CM
BH CV ECHO MEAS - LVOT AREA: 2.7 CM2
BH CV ECHO MEAS - LVOT DIAM: 1.85 CM
BH CV ECHO MEAS - LVPWD: 0.7 CM
BH CV ECHO MEAS - MED PEAK E' VEL: 6.7 CM/SEC
BH CV ECHO MEAS - MR MAX PG: 74.2 MMHG
BH CV ECHO MEAS - MR MAX VEL: 430.6 CM/SEC
BH CV ECHO MEAS - MV A DUR: 0.12 SEC
BH CV ECHO MEAS - MV A MAX VEL: 92.7 CM/SEC
BH CV ECHO MEAS - MV DEC SLOPE: 384 CM/SEC2
BH CV ECHO MEAS - MV DEC TIME: 0.23 SEC
BH CV ECHO MEAS - MV E MAX VEL: 104 CM/SEC
BH CV ECHO MEAS - MV E/A: 1.12
BH CV ECHO MEAS - MV MAX PG: 5.5 MMHG
BH CV ECHO MEAS - MV MEAN PG: 2.01 MMHG
BH CV ECHO MEAS - MV P1/2T: 94.4 MSEC
BH CV ECHO MEAS - MV V2 VTI: 41.2 CM
BH CV ECHO MEAS - MVA(P1/2T): 2.33 CM2
BH CV ECHO MEAS - MVA(VTI): 1.47 CM2
BH CV ECHO MEAS - PA ACC TIME: 0.13 SEC
BH CV ECHO MEAS - PA V2 MAX: 96.6 CM/SEC
BH CV ECHO MEAS - PULM A REVS DUR: 0.12 SEC
BH CV ECHO MEAS - PULM A REVS VEL: 34.8 CM/SEC
BH CV ECHO MEAS - PULM DIAS VEL: 45.7 CM/SEC
BH CV ECHO MEAS - PULM S/D: 1.19
BH CV ECHO MEAS - PULM SYS VEL: 54.3 CM/SEC
BH CV ECHO MEAS - RAP SYSTOLE: 3 MMHG
BH CV ECHO MEAS - RV MAX PG: 1.81 MMHG
BH CV ECHO MEAS - RV V1 MAX: 67.3 CM/SEC
BH CV ECHO MEAS - RV V1 VTI: 17.6 CM
BH CV ECHO MEAS - RVSP: 23.8 MMHG
BH CV ECHO MEAS - SI(MOD-SP2): 12 ML/M2
BH CV ECHO MEAS - SI(MOD-SP4): 12.5 ML/M2
BH CV ECHO MEAS - SUP REN AO DIAM: 1.6 CM
BH CV ECHO MEAS - SV(LVOT): 60.4 ML
BH CV ECHO MEAS - SV(MOD-SP2): 24 ML
BH CV ECHO MEAS - SV(MOD-SP4): 25 ML
BH CV ECHO MEAS - TAPSE (>1.6): 1.47 CM
BH CV ECHO MEAS - TR MAX PG: 20.8 MMHG
BH CV ECHO MEAS - TR MAX VEL: 228 CM/SEC
BH CV ECHO MEASUREMENTS AVERAGE E/E' RATIO: 14.75
BH CV STRESS BP STAGE 1: NORMAL
BH CV STRESS BP STAGE 2: NORMAL
BH CV STRESS DURATION MIN STAGE 1: 3
BH CV STRESS DURATION MIN STAGE 2: 3
BH CV STRESS DURATION MIN STAGE 3: 1
BH CV STRESS DURATION SEC STAGE 1: 0
BH CV STRESS DURATION SEC STAGE 2: 0
BH CV STRESS DURATION SEC STAGE 3: 0
BH CV STRESS GRADE STAGE 1: 10
BH CV STRESS GRADE STAGE 2: 12
BH CV STRESS GRADE STAGE 3: 14
BH CV STRESS HR STAGE 1: 111
BH CV STRESS HR STAGE 2: 138
BH CV STRESS HR STAGE 3: 154
BH CV STRESS METS STAGE 1: 5
BH CV STRESS METS STAGE 2: 7.5
BH CV STRESS METS STAGE 3: 9
BH CV STRESS PROTOCOL 1: NORMAL
BH CV STRESS RECOVERY BP: NORMAL MMHG
BH CV STRESS RECOVERY HR: 83 BPM
BH CV STRESS SPEED STAGE 1: 1.7
BH CV STRESS SPEED STAGE 2: 2.5
BH CV STRESS SPEED STAGE 3: 3.4
BH CV STRESS STAGE 1: 1
BH CV STRESS STAGE 2: 2
BH CV STRESS STAGE 3: 3
BH CV XLRA - TDI S': 12.9 CM/SEC
LEFT ATRIUM VOLUME INDEX: 17.9 ML/M2
MAXIMAL PREDICTED HEART RATE: 163 BPM
PERCENT MAX PREDICTED HR: 94.48 %
SINUS: 2.45 CM
STJ: 2.45 CM
STRESS BASELINE BP: NORMAL MMHG
STRESS BASELINE HR: 79 BPM
STRESS PERCENT HR: 111 %
STRESS POST ESTIMATED WORKLOAD: 9 METS
STRESS POST EXERCISE DUR MIN: 7 MIN
STRESS POST EXERCISE DUR SEC: 0 SEC
STRESS POST PEAK BP: NORMAL MMHG
STRESS POST PEAK HR: 154 BPM
STRESS TARGET HR: 139 BPM

## 2024-01-03 PROCEDURE — 93306 TTE W/DOPPLER COMPLETE: CPT

## 2024-01-03 PROCEDURE — 93017 CV STRESS TEST TRACING ONLY: CPT

## 2024-01-04 NOTE — PROGRESS NOTES
Called and discussed normal stress test and stable-appearing echocardiogram results.  Will see her back as scheduled in December.

## 2024-02-22 ENCOUNTER — TRANSCRIBE ORDERS (OUTPATIENT)
Dept: ADMINISTRATIVE | Facility: HOSPITAL | Age: 59
End: 2024-02-22
Payer: COMMERCIAL

## 2024-02-22 ENCOUNTER — LAB (OUTPATIENT)
Dept: LAB | Facility: HOSPITAL | Age: 59
End: 2024-02-22
Payer: COMMERCIAL

## 2024-02-22 DIAGNOSIS — E11.9 DIABETES MELLITUS WITHOUT COMPLICATION: ICD-10-CM

## 2024-02-22 DIAGNOSIS — E11.9 DIABETES MELLITUS WITHOUT COMPLICATION: Primary | ICD-10-CM

## 2024-02-22 LAB
ALBUMIN SERPL-MCNC: 4.8 G/DL (ref 3.5–5.2)
ALBUMIN/GLOB SERPL: 2 G/DL
ALP SERPL-CCNC: 74 U/L (ref 39–117)
ALT SERPL W P-5'-P-CCNC: 17 U/L (ref 1–33)
ANION GAP SERPL CALCULATED.3IONS-SCNC: 11.8 MMOL/L (ref 5–15)
AST SERPL-CCNC: 15 U/L (ref 1–32)
BILIRUB SERPL-MCNC: 0.4 MG/DL (ref 0–1.2)
BUN SERPL-MCNC: 11 MG/DL (ref 6–20)
BUN/CREAT SERPL: 15.9 (ref 7–25)
CALCIUM SPEC-SCNC: 9.9 MG/DL (ref 8.6–10.5)
CHLORIDE SERPL-SCNC: 104 MMOL/L (ref 98–107)
CO2 SERPL-SCNC: 25.2 MMOL/L (ref 22–29)
CREAT SERPL-MCNC: 0.69 MG/DL (ref 0.57–1)
EGFRCR SERPLBLD CKD-EPI 2021: 101.4 ML/MIN/1.73
GLOBULIN UR ELPH-MCNC: 2.4 GM/DL
GLUCOSE SERPL-MCNC: 139 MG/DL (ref 65–99)
HBA1C MFR BLD: 8.1 % (ref 4.8–5.6)
POTASSIUM SERPL-SCNC: 5.2 MMOL/L (ref 3.5–5.2)
PROT SERPL-MCNC: 7.2 G/DL (ref 6–8.5)
SODIUM SERPL-SCNC: 141 MMOL/L (ref 136–145)

## 2024-02-22 PROCEDURE — 36415 COLL VENOUS BLD VENIPUNCTURE: CPT

## 2024-02-22 PROCEDURE — 83036 HEMOGLOBIN GLYCOSYLATED A1C: CPT

## 2024-02-22 PROCEDURE — 80053 COMPREHEN METABOLIC PANEL: CPT

## 2024-05-08 ENCOUNTER — TRANSCRIBE ORDERS (OUTPATIENT)
Dept: LAB | Facility: HOSPITAL | Age: 59
End: 2024-05-08
Payer: COMMERCIAL

## 2024-05-08 DIAGNOSIS — E11.9 DIABETES MELLITUS WITHOUT COMPLICATION: ICD-10-CM

## 2024-05-08 DIAGNOSIS — E55.9 VITAMIN D DEFICIENCY, UNSPECIFIED: ICD-10-CM

## 2024-05-08 DIAGNOSIS — M54.50 LOW BACK PAIN, UNSPECIFIED BACK PAIN LATERALITY, UNSPECIFIED CHRONICITY, UNSPECIFIED WHETHER SCIATICA PRESENT: ICD-10-CM

## 2024-05-08 DIAGNOSIS — E78.81 LIPOID DERMATOARTHRITIS: Primary | ICD-10-CM

## 2024-05-08 DIAGNOSIS — R60.9 EDEMA, UNSPECIFIED TYPE: ICD-10-CM

## 2024-05-25 ENCOUNTER — LAB (OUTPATIENT)
Dept: LAB | Facility: HOSPITAL | Age: 59
End: 2024-05-25
Payer: COMMERCIAL

## 2024-05-25 DIAGNOSIS — R60.9 EDEMA, UNSPECIFIED TYPE: ICD-10-CM

## 2024-05-25 DIAGNOSIS — E55.9 VITAMIN D DEFICIENCY, UNSPECIFIED: ICD-10-CM

## 2024-05-25 DIAGNOSIS — E78.81 LIPOID DERMATOARTHRITIS: ICD-10-CM

## 2024-05-25 DIAGNOSIS — M54.50 LOW BACK PAIN, UNSPECIFIED BACK PAIN LATERALITY, UNSPECIFIED CHRONICITY, UNSPECIFIED WHETHER SCIATICA PRESENT: ICD-10-CM

## 2024-05-25 DIAGNOSIS — E11.9 DIABETES MELLITUS WITHOUT COMPLICATION: ICD-10-CM

## 2024-05-25 LAB
25(OH)D3 SERPL-MCNC: 52.6 NG/ML (ref 30–100)
ALBUMIN SERPL-MCNC: 4.6 G/DL (ref 3.5–5.2)
ALBUMIN/GLOB SERPL: 2.1 G/DL
ALP SERPL-CCNC: 81 U/L (ref 39–117)
ALT SERPL W P-5'-P-CCNC: 21 U/L (ref 1–33)
ANION GAP SERPL CALCULATED.3IONS-SCNC: 12.8 MMOL/L (ref 5–15)
AST SERPL-CCNC: 22 U/L (ref 1–32)
BASOPHILS # BLD AUTO: 0.04 10*3/MM3 (ref 0–0.2)
BASOPHILS NFR BLD AUTO: 0.5 % (ref 0–1.5)
BILIRUB SERPL-MCNC: 0.4 MG/DL (ref 0–1.2)
BUN SERPL-MCNC: 16 MG/DL (ref 6–20)
BUN/CREAT SERPL: 24.2 (ref 7–25)
CALCIUM SPEC-SCNC: 9.7 MG/DL (ref 8.6–10.5)
CHLORIDE SERPL-SCNC: 103 MMOL/L (ref 98–107)
CHOLEST SERPL-MCNC: 145 MG/DL (ref 0–200)
CO2 SERPL-SCNC: 22.2 MMOL/L (ref 22–29)
CREAT SERPL-MCNC: 0.66 MG/DL (ref 0.57–1)
DEPRECATED RDW RBC AUTO: 43.6 FL (ref 37–54)
EGFRCR SERPLBLD CKD-EPI 2021: 102.5 ML/MIN/1.73
EOSINOPHIL # BLD AUTO: 0.26 10*3/MM3 (ref 0–0.4)
EOSINOPHIL NFR BLD AUTO: 3.1 % (ref 0.3–6.2)
ERYTHROCYTE [DISTWIDTH] IN BLOOD BY AUTOMATED COUNT: 12.9 % (ref 12.3–15.4)
GLOBULIN UR ELPH-MCNC: 2.2 GM/DL
GLUCOSE SERPL-MCNC: 150 MG/DL (ref 65–99)
HBA1C MFR BLD: 8.1 % (ref 4.8–5.6)
HCT VFR BLD AUTO: 46.3 % (ref 34–46.6)
HDLC SERPL-MCNC: 50 MG/DL (ref 40–60)
HGB BLD-MCNC: 15.4 G/DL (ref 12–15.9)
IMM GRANULOCYTES # BLD AUTO: 0.03 10*3/MM3 (ref 0–0.05)
IMM GRANULOCYTES NFR BLD AUTO: 0.4 % (ref 0–0.5)
LDLC SERPL CALC-MCNC: 59 MG/DL (ref 0–100)
LDLC/HDLC SERPL: 1 {RATIO}
LYMPHOCYTES # BLD AUTO: 1.29 10*3/MM3 (ref 0.7–3.1)
LYMPHOCYTES NFR BLD AUTO: 15.2 % (ref 19.6–45.3)
MCH RBC QN AUTO: 30.5 PG (ref 26.6–33)
MCHC RBC AUTO-ENTMCNC: 33.3 G/DL (ref 31.5–35.7)
MCV RBC AUTO: 91.7 FL (ref 79–97)
MONOCYTES # BLD AUTO: 0.58 10*3/MM3 (ref 0.1–0.9)
MONOCYTES NFR BLD AUTO: 6.8 % (ref 5–12)
NEUTROPHILS NFR BLD AUTO: 6.29 10*3/MM3 (ref 1.7–7)
NEUTROPHILS NFR BLD AUTO: 74 % (ref 42.7–76)
NRBC BLD AUTO-RTO: 0 /100 WBC (ref 0–0.2)
PLATELET # BLD AUTO: 273 10*3/MM3 (ref 140–450)
PMV BLD AUTO: 10.6 FL (ref 6–12)
POTASSIUM SERPL-SCNC: 4 MMOL/L (ref 3.5–5.2)
PROT SERPL-MCNC: 6.8 G/DL (ref 6–8.5)
RBC # BLD AUTO: 5.05 10*6/MM3 (ref 3.77–5.28)
SODIUM SERPL-SCNC: 138 MMOL/L (ref 136–145)
TRIGL SERPL-MCNC: 224 MG/DL (ref 0–150)
TSH SERPL DL<=0.05 MIU/L-ACNC: 1.84 UIU/ML (ref 0.27–4.2)
VLDLC SERPL-MCNC: 36 MG/DL (ref 5–40)
WBC NRBC COR # BLD AUTO: 8.49 10*3/MM3 (ref 3.4–10.8)

## 2024-05-25 PROCEDURE — 80061 LIPID PANEL: CPT

## 2024-05-25 PROCEDURE — 36415 COLL VENOUS BLD VENIPUNCTURE: CPT

## 2024-05-25 PROCEDURE — 83036 HEMOGLOBIN GLYCOSYLATED A1C: CPT

## 2024-05-25 PROCEDURE — 82306 VITAMIN D 25 HYDROXY: CPT

## 2024-05-25 PROCEDURE — 80050 GENERAL HEALTH PANEL: CPT

## 2024-09-05 ENCOUNTER — TRANSCRIBE ORDERS (OUTPATIENT)
Dept: LAB | Facility: HOSPITAL | Age: 59
End: 2024-09-05
Payer: COMMERCIAL

## 2024-09-05 ENCOUNTER — LAB (OUTPATIENT)
Dept: LAB | Facility: HOSPITAL | Age: 59
End: 2024-09-05
Payer: COMMERCIAL

## 2024-09-05 DIAGNOSIS — E11.9 DIABETES MELLITUS WITHOUT COMPLICATION: ICD-10-CM

## 2024-09-05 DIAGNOSIS — E11.9 DIABETES MELLITUS WITHOUT COMPLICATION: Primary | ICD-10-CM

## 2024-09-05 LAB
ALBUMIN SERPL-MCNC: 4.9 G/DL (ref 3.5–5.2)
ALBUMIN/GLOB SERPL: 1.7 G/DL
ALP SERPL-CCNC: 86 U/L (ref 39–117)
ALT SERPL W P-5'-P-CCNC: 23 U/L (ref 1–33)
ANION GAP SERPL CALCULATED.3IONS-SCNC: 14.7 MMOL/L (ref 5–15)
AST SERPL-CCNC: 18 U/L (ref 1–32)
BILIRUB SERPL-MCNC: 0.4 MG/DL (ref 0–1.2)
BUN SERPL-MCNC: 19 MG/DL (ref 6–20)
BUN/CREAT SERPL: 29.2 (ref 7–25)
CALCIUM SPEC-SCNC: 10.5 MG/DL (ref 8.6–10.5)
CHLORIDE SERPL-SCNC: 101 MMOL/L (ref 98–107)
CO2 SERPL-SCNC: 23.3 MMOL/L (ref 22–29)
CREAT SERPL-MCNC: 0.65 MG/DL (ref 0.57–1)
EGFRCR SERPLBLD CKD-EPI 2021: 102.2 ML/MIN/1.73
GLOBULIN UR ELPH-MCNC: 2.9 GM/DL
GLUCOSE SERPL-MCNC: 142 MG/DL (ref 65–99)
HBA1C MFR BLD: 7.8 % (ref 4.8–5.6)
POTASSIUM SERPL-SCNC: 4 MMOL/L (ref 3.5–5.2)
PROT SERPL-MCNC: 7.8 G/DL (ref 6–8.5)
SODIUM SERPL-SCNC: 139 MMOL/L (ref 136–145)

## 2024-09-05 PROCEDURE — 36415 COLL VENOUS BLD VENIPUNCTURE: CPT

## 2024-09-05 PROCEDURE — 80053 COMPREHEN METABOLIC PANEL: CPT

## 2024-09-05 PROCEDURE — 83036 HEMOGLOBIN GLYCOSYLATED A1C: CPT

## 2024-11-08 ENCOUNTER — OFFICE VISIT (OUTPATIENT)
Age: 59
End: 2024-11-08
Payer: COMMERCIAL

## 2024-11-08 VITALS
HEART RATE: 82 BPM | TEMPERATURE: 98 F | RESPIRATION RATE: 22 BRPM | SYSTOLIC BLOOD PRESSURE: 116 MMHG | DIASTOLIC BLOOD PRESSURE: 75 MMHG | OXYGEN SATURATION: 94 % | HEIGHT: 68 IN | WEIGHT: 174.1 LBS | BODY MASS INDEX: 26.39 KG/M2

## 2024-11-08 DIAGNOSIS — K60.2 ANAL FISSURE: ICD-10-CM

## 2024-11-08 DIAGNOSIS — K64.5 THROMBOSED EXTERNAL HEMORRHOID: Primary | ICD-10-CM

## 2024-11-08 DIAGNOSIS — K64.8 INTERNAL HEMORRHOIDS WITH COMPLICATION: ICD-10-CM

## 2024-11-08 PROCEDURE — 99204 OFFICE O/P NEW MOD 45 MIN: CPT | Performed by: STUDENT IN AN ORGANIZED HEALTH CARE EDUCATION/TRAINING PROGRAM

## 2024-11-08 PROCEDURE — 46600 DIAGNOSTIC ANOSCOPY SPX: CPT | Performed by: STUDENT IN AN ORGANIZED HEALTH CARE EDUCATION/TRAINING PROGRAM

## 2024-11-08 NOTE — PROGRESS NOTES
Colorectal Surgery Consultation Note    ID:  Yadira Car;   : 1965  DATE OF VISIT: 2024    Chief Complaint  new patient  (Ref dr forte/ perianal abscess )       History of Present Illness  Yadira Car is a 59 y.o. female who I was asked to see in consultation by Dr. Singleton ref. provider found to evaluate for anorectal pain.    These symptoms have been going on for few few days. She was straining hard and felt a lump around her anus. She assumed it was hemorrhoids and applied herbal ointment for symptom relief.  She reports the swelling has significantly decreased.  She reports pain and discomfort.  She denies any bleeding with bowel movements.  She denies any fevers or chills.  She denies any history of anal fissure.  She reports inconsistent bowel habits.  She reports itching around the anus.    Last colonoscopy:  with a single polyp removed     Past Medical History  Past Medical History:   Diagnosis Date    Allergic     Allergies     to blue dye    Arthritis     Back injury     Diabetes 1.5, managed as type 2 2022    Diabetes mellitus 2020    New onset    Diabetes mellitus type I     Dyslipidemia     Low back pain     Neck injury     Renal calculi     from calcium    Spinal stenosis     Systolic murmur     UTI (urinary tract infection)     history of     Past Surgical History  Past Surgical History:   Procedure Laterality Date    D & C AND LAPAROSCOPY      ECTOPIC PREGNANCY      WISDOM TOOTH EXTRACTION       Family History  Family History   Problem Relation Age of Onset    Heart disease Mother     Diabetes Mother     Arthritis Mother     Supraventricular tachycardia Mother     Atrial fibrillation Mother     Hypertension Father     Diabetes Father     Stroke Father     Hyperlipidemia Father     No Known Problems Brother     Heart disease Maternal Uncle     Diabetes Maternal Grandmother     Diabetes Maternal Grandfather     Heart disease Maternal Grandfather      No colorectal cancer history  in immediate family members.  Social History  Social History     Tobacco Use    Smoking status: Former     Current packs/day: 0.50     Average packs/day: 0.5 packs/day for 35.0 years (17.5 ttl pk-yrs)     Types: Cigarettes    Smokeless tobacco: Never    Tobacco comments:     On and Off   Vaping Use    Vaping status: Never Used   Substance Use Topics    Alcohol use: Yes     Comment: once-twice/yr.--caffeine use    Drug use: No     For further details please see Health History Questionnaire scanned in Epic.  Medication List  [unfilled]  Allergies  Allergies   Allergen Reactions    Blue Dyes (Parenteral)      Review of Systems  All systems reviewed and are otherwise negative, pertinent positives noted in the HPI and Health History Questionnaire scanned in Epic.    Physical Exam  General:  No acute distress  Head: Normocephalic, atraumatic  Neuro: Alert and oriented    Abdomen:  Soft, non-tender, non-distended, no hernias, no hepatomegaly, no splenomegaly. No abnormal, audible bowel sounds.    External anorectal exam: Multiple perianal skin tag, thrombosed external hemorrhoids in the left lateral.  Anterior midline anal fissure noted on distraction of buttocks with exposed internal sphincter. There is  associated skin tag.  There is induration in the perineum and fullness.  There is no erythema or fluctuant collection.  There is no external fistulous opening.  Digital rectal:  tenderness with exam, no palpable masses, tone is slightly increased.    Procedure Note  Procedure: anoscopy  Indication: rectal pain  Description: After digital examination was completed the scope was inserted into the anal canal. The rectum/anus was visualized to 5cm. See Findings below. The scope was then removed. Patient tolerated procedure well.  Findings: Anterior anal fissure is seen. Prominent, internal hemorrhoids in the classic quadrants, otherwise no other mucosal lesions.        Assessment  -Anal fissure  -Anorectal bleeding    -Anorectal pain   -Enlarged internal hemorrhoids  -Pruritis ani  -Irregular bowel habits    Plan / Recommendations    1. A handout regarding anal fissures was given to patient. I explained the pathophysiology behind anal fissures and our initial management which will be with topical Nifedipine with lidocaine cream for which a prescription is given.  We discussed that she has induration and fullness in the perineum.  However I do not appreciate any fistulous track around the fissure to the Indurated area. There is no external fistulous opening. No sign of fluctuant collection.       We have discussed that patient may require surgery if it is refractory to medical management which would carry the risks of bleeding, abscess, incontinence, recurrence, or need for further procedures.    2. In order to help with her bowel movements a high fiber diet is encouraged along with supplemental fiber in the form of Citrucel or Metamucil or any of the other psyllium based products. I have recommended that she start by taking 2 teaspoons in a glass of water once per day for a week and to gradually work up to taking it twice per day. I explained that it is normal to have increased gas and bloating when first starting on fiber, but that this ought to get better after ~ 3 weeks to 3 months.        3. At this time the hemorrhoids are minimally symptomatic and will plan to treat them only if she becomes symptomatic in the future.    4. Patient will follow up in 1 month for re-evaluation.       Natalio Puente MD  Colon and Rectal Surgery   [facility]

## 2024-11-13 ENCOUNTER — TELEPHONE (OUTPATIENT)
Age: 59
End: 2024-11-13
Payer: COMMERCIAL

## 2024-11-13 NOTE — TELEPHONE ENCOUNTER
Pharmacy just called her last night saying it is ready to be picked up. She has been using OTC preparation H with lidocaine but pain has gotten worse and spot has gotten larger

## 2024-11-13 NOTE — TELEPHONE ENCOUNTER
did she get the Nifedipine ?    Topical Ketoconazole Pregnancy And Lactation Text: This medication is Pregnancy Category B and is considered safe during pregnancy. It is unknown if it is excreted in breast milk.

## 2024-11-13 NOTE — TELEPHONE ENCOUNTER
Patient left a  on [facility] this morning saying she was seen on Friday and the fissure she was dx with has gotten much larger and much more painful. Asking if you want her to go to the ER for this?

## 2024-11-13 NOTE — TELEPHONE ENCOUNTER
sitz bath, twice daily, apply the nifedipine 3-4 times daily. Continue with metamucil. If still worsening, go to the ED

## 2024-11-14 ENCOUNTER — TELEPHONE (OUTPATIENT)
Age: 59
End: 2024-11-14
Payer: COMMERCIAL

## 2024-11-14 ENCOUNTER — PATIENT ROUNDING (BHMG ONLY) (OUTPATIENT)
Age: 59
End: 2024-11-14
Payer: COMMERCIAL

## 2024-11-14 ENCOUNTER — HOSPITAL ENCOUNTER (OUTPATIENT)
Facility: HOSPITAL | Age: 59
Setting detail: OBSERVATION
Discharge: HOME | End: 2024-11-15
Attending: STUDENT IN AN ORGANIZED HEALTH CARE EDUCATION/TRAINING PROGRAM | Admitting: STUDENT IN AN ORGANIZED HEALTH CARE EDUCATION/TRAINING PROGRAM
Payer: COMMERCIAL

## 2024-11-14 ENCOUNTER — ANESTHESIA EVENT (OUTPATIENT)
Dept: PERIOP | Facility: HOSPITAL | Age: 59
End: 2024-11-14
Payer: COMMERCIAL

## 2024-11-14 ENCOUNTER — APPOINTMENT (OUTPATIENT)
Dept: CT IMAGING | Facility: HOSPITAL | Age: 59
End: 2024-11-14
Payer: COMMERCIAL

## 2024-11-14 ENCOUNTER — ANESTHESIA (OUTPATIENT)
Dept: PERIOP | Facility: HOSPITAL | Age: 59
End: 2024-11-14
Payer: COMMERCIAL

## 2024-11-14 DIAGNOSIS — K60.30 ANAL FISSURE AND FISTULA: ICD-10-CM

## 2024-11-14 DIAGNOSIS — K60.2 ANAL FISSURE AND FISTULA: ICD-10-CM

## 2024-11-14 DIAGNOSIS — K61.0 PERIANAL ABSCESS: Primary | ICD-10-CM

## 2024-11-14 LAB
ANION GAP SERPL CALCULATED.3IONS-SCNC: 18.3 MMOL/L (ref 5–15)
BASOPHILS # BLD AUTO: 0.05 10*3/MM3 (ref 0–0.2)
BASOPHILS NFR BLD AUTO: 0.3 % (ref 0–1.5)
BUN SERPL-MCNC: 18 MG/DL (ref 6–20)
BUN/CREAT SERPL: 29 (ref 7–25)
CALCIUM SPEC-SCNC: 9.8 MG/DL (ref 8.6–10.5)
CHLORIDE SERPL-SCNC: 100 MMOL/L (ref 98–107)
CO2 SERPL-SCNC: 22.7 MMOL/L (ref 22–29)
CREAT SERPL-MCNC: 0.62 MG/DL (ref 0.57–1)
DEPRECATED RDW RBC AUTO: 47.4 FL (ref 37–54)
EGFRCR SERPLBLD CKD-EPI 2021: 103.4 ML/MIN/1.73
EOSINOPHIL # BLD AUTO: 0.18 10*3/MM3 (ref 0–0.4)
EOSINOPHIL NFR BLD AUTO: 0.9 % (ref 0.3–6.2)
ERYTHROCYTE [DISTWIDTH] IN BLOOD BY AUTOMATED COUNT: 13.6 % (ref 12.3–15.4)
GLUCOSE BLDC GLUCOMTR-MCNC: 131 MG/DL (ref 70–105)
GLUCOSE BLDC GLUCOMTR-MCNC: 141 MG/DL (ref 70–105)
GLUCOSE BLDC GLUCOMTR-MCNC: 218 MG/DL (ref 70–105)
GLUCOSE SERPL-MCNC: 135 MG/DL (ref 65–99)
HCT VFR BLD AUTO: 45.7 % (ref 34–46.6)
HGB BLD-MCNC: 14.4 G/DL (ref 12–15.9)
IMM GRANULOCYTES # BLD AUTO: 0.12 10*3/MM3 (ref 0–0.05)
IMM GRANULOCYTES NFR BLD AUTO: 0.6 % (ref 0–0.5)
LYMPHOCYTES # BLD AUTO: 2.15 10*3/MM3 (ref 0.7–3.1)
LYMPHOCYTES NFR BLD AUTO: 11.3 % (ref 19.6–45.3)
MCH RBC QN AUTO: 29.7 PG (ref 26.6–33)
MCHC RBC AUTO-ENTMCNC: 31.5 G/DL (ref 31.5–35.7)
MCV RBC AUTO: 94.2 FL (ref 79–97)
MONOCYTES # BLD AUTO: 1.63 10*3/MM3 (ref 0.1–0.9)
MONOCYTES NFR BLD AUTO: 8.6 % (ref 5–12)
NEUTROPHILS NFR BLD AUTO: 14.87 10*3/MM3 (ref 1.7–7)
NEUTROPHILS NFR BLD AUTO: 78.3 % (ref 42.7–76)
NRBC BLD AUTO-RTO: 0 /100 WBC (ref 0–0.2)
PLATELET # BLD AUTO: 267 10*3/MM3 (ref 140–450)
PMV BLD AUTO: 9.6 FL (ref 6–12)
POTASSIUM SERPL-SCNC: 4.7 MMOL/L (ref 3.5–5.2)
RBC # BLD AUTO: 4.85 10*6/MM3 (ref 3.77–5.28)
SODIUM SERPL-SCNC: 141 MMOL/L (ref 136–145)
WBC NRBC COR # BLD AUTO: 19 10*3/MM3 (ref 3.4–10.8)

## 2024-11-14 PROCEDURE — 25010000002 FENTANYL CITRATE (PF) 50 MCG/ML SOLUTION: Performed by: ANESTHESIOLOGIST ASSISTANT

## 2024-11-14 PROCEDURE — 96375 TX/PRO/DX INJ NEW DRUG ADDON: CPT

## 2024-11-14 PROCEDURE — 25010000002 ONDANSETRON PER 1 MG: Performed by: ANESTHESIOLOGIST ASSISTANT

## 2024-11-14 PROCEDURE — 25010000002 PROPOFOL 200 MG/20ML EMULSION: Performed by: ANESTHESIOLOGIST ASSISTANT

## 2024-11-14 PROCEDURE — 99285 EMERGENCY DEPT VISIT HI MDM: CPT

## 2024-11-14 PROCEDURE — 87186 SC STD MICRODIL/AGAR DIL: CPT | Performed by: STUDENT IN AN ORGANIZED HEALTH CARE EDUCATION/TRAINING PROGRAM

## 2024-11-14 PROCEDURE — C9290 INJ, BUPIVACAINE LIPOSOME: HCPCS | Performed by: STUDENT IN AN ORGANIZED HEALTH CARE EDUCATION/TRAINING PROGRAM

## 2024-11-14 PROCEDURE — 25010000002 HEPARIN (PORCINE) PER 1000 UNITS: Performed by: STUDENT IN AN ORGANIZED HEALTH CARE EDUCATION/TRAINING PROGRAM

## 2024-11-14 PROCEDURE — 25810000003 LACTATED RINGERS PER 1000 ML: Performed by: ANESTHESIOLOGIST ASSISTANT

## 2024-11-14 PROCEDURE — 25010000002 HYDROMORPHONE 1 MG/ML SOLUTION: Performed by: NURSE PRACTITIONER

## 2024-11-14 PROCEDURE — 25010000002 LIDOCAINE PF 1% 1 % SOLUTION: Performed by: ANESTHESIOLOGIST ASSISTANT

## 2024-11-14 PROCEDURE — 25010000002 DEXAMETHASONE PER 1 MG: Performed by: ANESTHESIOLOGIST ASSISTANT

## 2024-11-14 PROCEDURE — 46020 PLACEMENT OF SETON: CPT | Performed by: STUDENT IN AN ORGANIZED HEALTH CARE EDUCATION/TRAINING PROGRAM

## 2024-11-14 PROCEDURE — 25010000002 HYDROMORPHONE 1 MG/ML SOLUTION: Performed by: NURSE ANESTHETIST, CERTIFIED REGISTERED

## 2024-11-14 PROCEDURE — 87070 CULTURE OTHR SPECIMN AEROBIC: CPT | Performed by: STUDENT IN AN ORGANIZED HEALTH CARE EDUCATION/TRAINING PROGRAM

## 2024-11-14 PROCEDURE — 82948 REAGENT STRIP/BLOOD GLUCOSE: CPT | Performed by: ANESTHESIOLOGIST ASSISTANT

## 2024-11-14 PROCEDURE — G0378 HOSPITAL OBSERVATION PER HR: HCPCS

## 2024-11-14 PROCEDURE — 96367 TX/PROPH/DG ADDL SEQ IV INF: CPT

## 2024-11-14 PROCEDURE — 85025 COMPLETE CBC W/AUTO DIFF WBC: CPT | Performed by: NURSE PRACTITIONER

## 2024-11-14 PROCEDURE — 80048 BASIC METABOLIC PNL TOTAL CA: CPT | Performed by: NURSE PRACTITIONER

## 2024-11-14 PROCEDURE — 25010000002 AMPICILLIN-SULBACTAM PER 1.5 G: Performed by: STUDENT IN AN ORGANIZED HEALTH CARE EDUCATION/TRAINING PROGRAM

## 2024-11-14 PROCEDURE — 87205 SMEAR GRAM STAIN: CPT | Performed by: STUDENT IN AN ORGANIZED HEALTH CARE EDUCATION/TRAINING PROGRAM

## 2024-11-14 PROCEDURE — 25010000002 METRONIDAZOLE 500 MG/100ML SOLUTION: Performed by: NURSE PRACTITIONER

## 2024-11-14 PROCEDURE — 25010000003 BUPIVACAINE LIPOSOME 1.3 % SUSPENSION: Performed by: STUDENT IN AN ORGANIZED HEALTH CARE EDUCATION/TRAINING PROGRAM

## 2024-11-14 PROCEDURE — 46050 I&D PERIANAL ABSCESS SUPFC: CPT | Performed by: STUDENT IN AN ORGANIZED HEALTH CARE EDUCATION/TRAINING PROGRAM

## 2024-11-14 PROCEDURE — 82948 REAGENT STRIP/BLOOD GLUCOSE: CPT

## 2024-11-14 PROCEDURE — 25510000001 IOPAMIDOL PER 1 ML: Performed by: NURSE PRACTITIONER

## 2024-11-14 PROCEDURE — 25010000002 CEFTRIAXONE PER 250 MG: Performed by: NURSE PRACTITIONER

## 2024-11-14 PROCEDURE — 96365 THER/PROPH/DIAG IV INF INIT: CPT

## 2024-11-14 PROCEDURE — 46200 REMOVAL OF ANAL FISSURE: CPT | Performed by: STUDENT IN AN ORGANIZED HEALTH CARE EDUCATION/TRAINING PROGRAM

## 2024-11-14 PROCEDURE — 72193 CT PELVIS W/DYE: CPT

## 2024-11-14 PROCEDURE — 25010000002 SUGAMMADEX 200 MG/2ML SOLUTION: Performed by: NURSE ANESTHETIST, CERTIFIED REGISTERED

## 2024-11-14 PROCEDURE — 87077 CULTURE AEROBIC IDENTIFY: CPT | Performed by: STUDENT IN AN ORGANIZED HEALTH CARE EDUCATION/TRAINING PROGRAM

## 2024-11-14 PROCEDURE — 25810000003 SODIUM CHLORIDE 0.9 % SOLUTION: Performed by: STUDENT IN AN ORGANIZED HEALTH CARE EDUCATION/TRAINING PROGRAM

## 2024-11-14 PROCEDURE — 25010000002 BUPIVACAINE (PF) 0.25 % SOLUTION: Performed by: STUDENT IN AN ORGANIZED HEALTH CARE EDUCATION/TRAINING PROGRAM

## 2024-11-14 RX ORDER — ACETAMINOPHEN 650 MG/1
650 SUPPOSITORY RECTAL EVERY 4 HOURS PRN
Status: DISCONTINUED | OUTPATIENT
Start: 2024-11-14 | End: 2024-11-15 | Stop reason: HOSPADM

## 2024-11-14 RX ORDER — FENTANYL CITRATE 50 UG/ML
INJECTION, SOLUTION INTRAMUSCULAR; INTRAVENOUS AS NEEDED
Status: DISCONTINUED | OUTPATIENT
Start: 2024-11-14 | End: 2024-11-14 | Stop reason: SURG

## 2024-11-14 RX ORDER — LORAZEPAM 2 MG/ML
2 INJECTION INTRAMUSCULAR
Status: DISCONTINUED | OUTPATIENT
Start: 2024-11-14 | End: 2024-11-14 | Stop reason: HOSPADM

## 2024-11-14 RX ORDER — MEPERIDINE HYDROCHLORIDE 25 MG/ML
12.5 INJECTION INTRAMUSCULAR; INTRAVENOUS; SUBCUTANEOUS
Status: DISCONTINUED | OUTPATIENT
Start: 2024-11-14 | End: 2024-11-14 | Stop reason: HOSPADM

## 2024-11-14 RX ORDER — KETOROLAC TROMETHAMINE 30 MG/ML
15 INJECTION, SOLUTION INTRAMUSCULAR; INTRAVENOUS EVERY 6 HOURS PRN
Status: DISCONTINUED | OUTPATIENT
Start: 2024-11-14 | End: 2024-11-15 | Stop reason: HOSPADM

## 2024-11-14 RX ORDER — DEXMEDETOMIDINE HYDROCHLORIDE 100 UG/ML
INJECTION, SOLUTION INTRAVENOUS AS NEEDED
Status: DISCONTINUED | OUTPATIENT
Start: 2024-11-14 | End: 2024-11-14 | Stop reason: SURG

## 2024-11-14 RX ORDER — SODIUM CHLORIDE, SODIUM LACTATE, POTASSIUM CHLORIDE, CALCIUM CHLORIDE 600; 310; 30; 20 MG/100ML; MG/100ML; MG/100ML; MG/100ML
INJECTION, SOLUTION INTRAVENOUS CONTINUOUS PRN
Status: DISCONTINUED | OUTPATIENT
Start: 2024-11-14 | End: 2024-11-14 | Stop reason: SURG

## 2024-11-14 RX ORDER — IBUPROFEN 600 MG/1
600 TABLET, FILM COATED ORAL ONCE AS NEEDED
Status: DISCONTINUED | OUTPATIENT
Start: 2024-11-14 | End: 2024-11-14 | Stop reason: HOSPADM

## 2024-11-14 RX ORDER — ACETAMINOPHEN 325 MG/1
650 TABLET ORAL ONCE AS NEEDED
Status: DISCONTINUED | OUTPATIENT
Start: 2024-11-14 | End: 2024-11-15 | Stop reason: HOSPADM

## 2024-11-14 RX ORDER — OXYCODONE HYDROCHLORIDE 5 MG/1
5 TABLET ORAL EVERY 6 HOURS PRN
Status: DISCONTINUED | OUTPATIENT
Start: 2024-11-14 | End: 2024-11-15 | Stop reason: HOSPADM

## 2024-11-14 RX ORDER — EPHEDRINE SULFATE 5 MG/ML
5 INJECTION INTRAVENOUS ONCE AS NEEDED
Status: DISCONTINUED | OUTPATIENT
Start: 2024-11-14 | End: 2024-11-14 | Stop reason: HOSPADM

## 2024-11-14 RX ORDER — KETOROLAC TROMETHAMINE 30 MG/ML
30 INJECTION, SOLUTION INTRAMUSCULAR; INTRAVENOUS EVERY 6 HOURS PRN
Status: DISCONTINUED | OUTPATIENT
Start: 2024-11-14 | End: 2024-11-15 | Stop reason: HOSPADM

## 2024-11-14 RX ORDER — PROPOFOL 10 MG/ML
INJECTION, EMULSION INTRAVENOUS AS NEEDED
Status: DISCONTINUED | OUTPATIENT
Start: 2024-11-14 | End: 2024-11-14 | Stop reason: SURG

## 2024-11-14 RX ORDER — OXYCODONE HYDROCHLORIDE 5 MG/1
10 TABLET ORAL ONCE AS NEEDED
Status: DISCONTINUED | OUTPATIENT
Start: 2024-11-14 | End: 2024-11-14 | Stop reason: HOSPADM

## 2024-11-14 RX ORDER — SODIUM CHLORIDE 9 MG/ML
40 INJECTION, SOLUTION INTRAVENOUS AS NEEDED
Status: DISCONTINUED | OUTPATIENT
Start: 2024-11-14 | End: 2024-11-15 | Stop reason: HOSPADM

## 2024-11-14 RX ORDER — IOPAMIDOL 755 MG/ML
100 INJECTION, SOLUTION INTRAVASCULAR
Status: COMPLETED | OUTPATIENT
Start: 2024-11-14 | End: 2024-11-14

## 2024-11-14 RX ORDER — DIPHENHYDRAMINE HYDROCHLORIDE 50 MG/ML
12.5 INJECTION INTRAMUSCULAR; INTRAVENOUS ONCE AS NEEDED
Status: DISCONTINUED | OUTPATIENT
Start: 2024-11-14 | End: 2024-11-14 | Stop reason: HOSPADM

## 2024-11-14 RX ORDER — BISACODYL 5 MG/1
5 TABLET, DELAYED RELEASE ORAL DAILY PRN
Status: DISCONTINUED | OUTPATIENT
Start: 2024-11-14 | End: 2024-11-15 | Stop reason: HOSPADM

## 2024-11-14 RX ORDER — HEPARIN SODIUM 5000 [USP'U]/ML
5000 INJECTION, SOLUTION INTRAVENOUS; SUBCUTANEOUS EVERY 8 HOURS SCHEDULED
Status: DISCONTINUED | OUTPATIENT
Start: 2024-11-14 | End: 2024-11-15 | Stop reason: HOSPADM

## 2024-11-14 RX ORDER — ROSUVASTATIN CALCIUM 10 MG/1
10 TABLET, COATED ORAL NIGHTLY
Status: DISCONTINUED | OUTPATIENT
Start: 2024-11-14 | End: 2024-11-15 | Stop reason: HOSPADM

## 2024-11-14 RX ORDER — IPRATROPIUM BROMIDE AND ALBUTEROL SULFATE 2.5; .5 MG/3ML; MG/3ML
3 SOLUTION RESPIRATORY (INHALATION) ONCE AS NEEDED
Status: DISCONTINUED | OUTPATIENT
Start: 2024-11-14 | End: 2024-11-14 | Stop reason: HOSPADM

## 2024-11-14 RX ORDER — METOCLOPRAMIDE HYDROCHLORIDE 5 MG/ML
10 INJECTION INTRAMUSCULAR; INTRAVENOUS ONCE AS NEEDED
Status: DISCONTINUED | OUTPATIENT
Start: 2024-11-14 | End: 2024-11-14 | Stop reason: HOSPADM

## 2024-11-14 RX ORDER — FENTANYL CITRATE 50 UG/ML
100 INJECTION, SOLUTION INTRAMUSCULAR; INTRAVENOUS
Status: DISCONTINUED | OUTPATIENT
Start: 2024-11-14 | End: 2024-11-14 | Stop reason: HOSPADM

## 2024-11-14 RX ORDER — SODIUM CHLORIDE 9 MG/ML
40 INJECTION, SOLUTION INTRAVENOUS AS NEEDED
Status: DISCONTINUED | OUTPATIENT
Start: 2024-11-14 | End: 2024-11-14 | Stop reason: HOSPADM

## 2024-11-14 RX ORDER — LIDOCAINE HYDROCHLORIDE 10 MG/ML
INJECTION, SOLUTION EPIDURAL; INFILTRATION; INTRACAUDAL; PERINEURAL AS NEEDED
Status: DISCONTINUED | OUTPATIENT
Start: 2024-11-14 | End: 2024-11-14 | Stop reason: SURG

## 2024-11-14 RX ORDER — SODIUM CHLORIDE 0.9 % (FLUSH) 0.9 %
10 SYRINGE (ML) INJECTION EVERY 12 HOURS SCHEDULED
Status: DISCONTINUED | OUTPATIENT
Start: 2024-11-14 | End: 2024-11-15 | Stop reason: HOSPADM

## 2024-11-14 RX ORDER — SODIUM CHLORIDE 0.9 % (FLUSH) 0.9 %
10 SYRINGE (ML) INJECTION AS NEEDED
Status: DISCONTINUED | OUTPATIENT
Start: 2024-11-14 | End: 2024-11-15 | Stop reason: HOSPADM

## 2024-11-14 RX ORDER — NALOXONE HCL 0.4 MG/ML
0.4 VIAL (ML) INJECTION AS NEEDED
Status: DISCONTINUED | OUTPATIENT
Start: 2024-11-14 | End: 2024-11-14 | Stop reason: HOSPADM

## 2024-11-14 RX ORDER — POLYETHYLENE GLYCOL 3350 17 G/17G
17 POWDER, FOR SOLUTION ORAL DAILY PRN
Status: DISCONTINUED | OUTPATIENT
Start: 2024-11-14 | End: 2024-11-15 | Stop reason: HOSPADM

## 2024-11-14 RX ORDER — BUPIVACAINE HYDROCHLORIDE 2.5 MG/ML
INJECTION, SOLUTION EPIDURAL; INFILTRATION; INTRACAUDAL AS NEEDED
Status: DISCONTINUED | OUTPATIENT
Start: 2024-11-14 | End: 2024-11-14 | Stop reason: HOSPADM

## 2024-11-14 RX ORDER — SODIUM CHLORIDE 0.9 % (FLUSH) 0.9 %
10 SYRINGE (ML) INJECTION AS NEEDED
Status: DISCONTINUED | OUTPATIENT
Start: 2024-11-14 | End: 2024-11-14 | Stop reason: HOSPADM

## 2024-11-14 RX ORDER — DIPHENHYDRAMINE HYDROCHLORIDE 50 MG/ML
12.5 INJECTION INTRAMUSCULAR; INTRAVENOUS
Status: DISCONTINUED | OUTPATIENT
Start: 2024-11-14 | End: 2024-11-14 | Stop reason: HOSPADM

## 2024-11-14 RX ORDER — PROCHLORPERAZINE EDISYLATE 5 MG/ML
10 INJECTION INTRAMUSCULAR; INTRAVENOUS ONCE AS NEEDED
Status: DISCONTINUED | OUTPATIENT
Start: 2024-11-14 | End: 2024-11-14 | Stop reason: HOSPADM

## 2024-11-14 RX ORDER — DEXTROSE MONOHYDRATE 25 G/50ML
10-50 INJECTION, SOLUTION INTRAVENOUS
Status: DISCONTINUED | OUTPATIENT
Start: 2024-11-14 | End: 2024-11-15 | Stop reason: HOSPADM

## 2024-11-14 RX ORDER — PROMETHAZINE HYDROCHLORIDE 25 MG/1
25 TABLET ORAL ONCE AS NEEDED
Status: DISCONTINUED | OUTPATIENT
Start: 2024-11-14 | End: 2024-11-14 | Stop reason: HOSPADM

## 2024-11-14 RX ORDER — AMOXICILLIN 250 MG
2 CAPSULE ORAL 2 TIMES DAILY PRN
Status: DISCONTINUED | OUTPATIENT
Start: 2024-11-14 | End: 2024-11-15 | Stop reason: HOSPADM

## 2024-11-14 RX ORDER — NICOTINE POLACRILEX 4 MG
15 LOZENGE BUCCAL
Status: DISCONTINUED | OUTPATIENT
Start: 2024-11-14 | End: 2024-11-15 | Stop reason: HOSPADM

## 2024-11-14 RX ORDER — IBUPROFEN 600 MG/1
1 TABLET ORAL
Status: DISCONTINUED | OUTPATIENT
Start: 2024-11-14 | End: 2024-11-15 | Stop reason: HOSPADM

## 2024-11-14 RX ORDER — DEXAMETHASONE SODIUM PHOSPHATE 4 MG/ML
8 INJECTION, SOLUTION INTRA-ARTICULAR; INTRALESIONAL; INTRAMUSCULAR; INTRAVENOUS; SOFT TISSUE ONCE AS NEEDED
Status: DISCONTINUED | OUTPATIENT
Start: 2024-11-14 | End: 2024-11-14 | Stop reason: HOSPADM

## 2024-11-14 RX ORDER — BISACODYL 10 MG
10 SUPPOSITORY, RECTAL RECTAL DAILY PRN
Status: DISCONTINUED | OUTPATIENT
Start: 2024-11-14 | End: 2024-11-15 | Stop reason: HOSPADM

## 2024-11-14 RX ORDER — ONDANSETRON 2 MG/ML
4 INJECTION INTRAMUSCULAR; INTRAVENOUS ONCE AS NEEDED
Status: DISCONTINUED | OUTPATIENT
Start: 2024-11-14 | End: 2024-11-14 | Stop reason: HOSPADM

## 2024-11-14 RX ORDER — DIPHENHYDRAMINE HCL 25 MG
25 CAPSULE ORAL
Status: DISCONTINUED | OUTPATIENT
Start: 2024-11-14 | End: 2024-11-14 | Stop reason: HOSPADM

## 2024-11-14 RX ORDER — HYDRALAZINE HYDROCHLORIDE 20 MG/ML
5 INJECTION INTRAMUSCULAR; INTRAVENOUS
Status: DISCONTINUED | OUTPATIENT
Start: 2024-11-14 | End: 2024-11-14 | Stop reason: HOSPADM

## 2024-11-14 RX ORDER — INSULIN LISPRO 100 [IU]/ML
1-200 INJECTION, SOLUTION INTRAVENOUS; SUBCUTANEOUS AS NEEDED
Status: DISCONTINUED | OUTPATIENT
Start: 2024-11-14 | End: 2024-11-15 | Stop reason: HOSPADM

## 2024-11-14 RX ORDER — DEXAMETHASONE SODIUM PHOSPHATE 4 MG/ML
INJECTION, SOLUTION INTRA-ARTICULAR; INTRALESIONAL; INTRAMUSCULAR; INTRAVENOUS; SOFT TISSUE AS NEEDED
Status: DISCONTINUED | OUTPATIENT
Start: 2024-11-14 | End: 2024-11-14 | Stop reason: SURG

## 2024-11-14 RX ORDER — SODIUM CHLORIDE 0.9 % (FLUSH) 0.9 %
10 SYRINGE (ML) INJECTION EVERY 12 HOURS SCHEDULED
Status: DISCONTINUED | OUTPATIENT
Start: 2024-11-14 | End: 2024-11-14 | Stop reason: HOSPADM

## 2024-11-14 RX ORDER — METRONIDAZOLE 500 MG/100ML
500 INJECTION, SOLUTION INTRAVENOUS ONCE
Status: COMPLETED | OUTPATIENT
Start: 2024-11-14 | End: 2024-11-14

## 2024-11-14 RX ORDER — ROCURONIUM BROMIDE 10 MG/ML
INJECTION, SOLUTION INTRAVENOUS AS NEEDED
Status: DISCONTINUED | OUTPATIENT
Start: 2024-11-14 | End: 2024-11-14 | Stop reason: SURG

## 2024-11-14 RX ORDER — INSULIN LISPRO 100 [IU]/ML
1-200 INJECTION, SOLUTION INTRAVENOUS; SUBCUTANEOUS
Status: DISCONTINUED | OUTPATIENT
Start: 2024-11-14 | End: 2024-11-15 | Stop reason: HOSPADM

## 2024-11-14 RX ORDER — ONDANSETRON 2 MG/ML
INJECTION INTRAMUSCULAR; INTRAVENOUS AS NEEDED
Status: DISCONTINUED | OUTPATIENT
Start: 2024-11-14 | End: 2024-11-14 | Stop reason: SURG

## 2024-11-14 RX ORDER — SODIUM CHLORIDE 9 MG/ML
1000 INJECTION, SOLUTION INTRAVENOUS ONCE
Status: COMPLETED | OUTPATIENT
Start: 2024-11-14 | End: 2024-11-14

## 2024-11-14 RX ORDER — FLUMAZENIL 0.1 MG/ML
0.5 INJECTION INTRAVENOUS AS NEEDED
Status: DISCONTINUED | OUTPATIENT
Start: 2024-11-14 | End: 2024-11-14 | Stop reason: HOSPADM

## 2024-11-14 RX ORDER — MIDAZOLAM HYDROCHLORIDE 1 MG/ML
1 INJECTION, SOLUTION INTRAMUSCULAR; INTRAVENOUS
Status: DISCONTINUED | OUTPATIENT
Start: 2024-11-14 | End: 2024-11-14 | Stop reason: HOSPADM

## 2024-11-14 RX ORDER — LABETALOL HYDROCHLORIDE 5 MG/ML
5 INJECTION, SOLUTION INTRAVENOUS
Status: DISCONTINUED | OUTPATIENT
Start: 2024-11-14 | End: 2024-11-14 | Stop reason: HOSPADM

## 2024-11-14 RX ORDER — PROMETHAZINE HYDROCHLORIDE 25 MG/1
25 SUPPOSITORY RECTAL ONCE AS NEEDED
Status: DISCONTINUED | OUTPATIENT
Start: 2024-11-14 | End: 2024-11-14 | Stop reason: HOSPADM

## 2024-11-14 RX ADMIN — SUGAMMADEX 200 MG: 100 INJECTION, SOLUTION INTRAVENOUS at 18:43

## 2024-11-14 RX ADMIN — METRONIDAZOLE 500 MG: 500 INJECTION, SOLUTION INTRAVENOUS at 14:03

## 2024-11-14 RX ADMIN — HEPARIN SODIUM 5000 UNITS: 5000 INJECTION INTRAVENOUS; SUBCUTANEOUS at 21:41

## 2024-11-14 RX ADMIN — HYDROMORPHONE HYDROCHLORIDE 0.5 MG: 1 INJECTION, SOLUTION INTRAMUSCULAR; INTRAVENOUS; SUBCUTANEOUS at 13:43

## 2024-11-14 RX ADMIN — SODIUM CHLORIDE 1000 ML: 9 INJECTION, SOLUTION INTRAVENOUS at 17:51

## 2024-11-14 RX ADMIN — DEXMEDETOMIDINE HYDROCHLORIDE 2 MCG: 100 INJECTION, SOLUTION INTRAVENOUS at 18:35

## 2024-11-14 RX ADMIN — ONDANSETRON 4 MG: 2 INJECTION, SOLUTION INTRAMUSCULAR; INTRAVENOUS at 18:17

## 2024-11-14 RX ADMIN — SODIUM CHLORIDE, SODIUM LACTATE, POTASSIUM CHLORIDE, AND CALCIUM CHLORIDE: .6; .31; .03; .02 INJECTION, SOLUTION INTRAVENOUS at 18:12

## 2024-11-14 RX ADMIN — AMPICILLIN SODIUM AND SULBACTAM SODIUM 3 G: 2; 1 INJECTION, POWDER, FOR SOLUTION INTRAMUSCULAR; INTRAVENOUS at 21:41

## 2024-11-14 RX ADMIN — DEXAMETHASONE SODIUM PHOSPHATE 8 MG: 4 INJECTION, SOLUTION INTRAMUSCULAR; INTRAVENOUS at 18:17

## 2024-11-14 RX ADMIN — DEXMEDETOMIDINE HYDROCHLORIDE 4 MCG: 100 INJECTION, SOLUTION INTRAVENOUS at 18:25

## 2024-11-14 RX ADMIN — HYDROMORPHONE HYDROCHLORIDE 0.5 MG: 1 INJECTION, SOLUTION INTRAMUSCULAR; INTRAVENOUS; SUBCUTANEOUS at 18:32

## 2024-11-14 RX ADMIN — PROPOFOL 200 MG: 10 INJECTION, EMULSION INTRAVENOUS at 18:14

## 2024-11-14 RX ADMIN — Medication 10 ML: at 21:51

## 2024-11-14 RX ADMIN — IOPAMIDOL 100 ML: 755 INJECTION, SOLUTION INTRAVENOUS at 11:23

## 2024-11-14 RX ADMIN — FENTANYL CITRATE 100 MCG: 50 INJECTION, SOLUTION INTRAMUSCULAR; INTRAVENOUS at 18:14

## 2024-11-14 RX ADMIN — ROCURONIUM BROMIDE 50 MG: 10 INJECTION, SOLUTION INTRAVENOUS at 18:14

## 2024-11-14 RX ADMIN — ROSUVASTATIN 10 MG: 10 TABLET, FILM COATED ORAL at 21:41

## 2024-11-14 RX ADMIN — CEFTRIAXONE 1000 MG: 1 INJECTION, POWDER, FOR SOLUTION INTRAMUSCULAR; INTRAVENOUS at 13:34

## 2024-11-14 RX ADMIN — LIDOCAINE HYDROCHLORIDE 50 MG: 10 INJECTION, SOLUTION EPIDURAL; INFILTRATION; INTRACAUDAL; PERINEURAL at 18:14

## 2024-11-14 RX ADMIN — DEXMEDETOMIDINE HYDROCHLORIDE 4 MCG: 100 INJECTION, SOLUTION INTRAVENOUS at 18:31

## 2024-11-14 NOTE — TELEPHONE ENCOUNTER
Patient left a message on [facility] this morning at 4:17 stating she has tried the compound cream and sitz bath as recommended and she is still in extreme pain. I called and to let her know that Dr Puente is not back in the office until Monday and if she is having so much pain she may need to proceed to the ER. Patient agreed

## 2024-11-14 NOTE — ED PROVIDER NOTES
Subjective   History of Present Illness  Patient is a 59-year-old white female who presents today with complaints of increasing pain and swelling to the perineum.  She states she recently saw Dr. Puente, colorectal surgeon, and states she was diagnosed with an anal fissure.  She was prescribed some topical medication and has been using sitz bath's but reports increasing pain and swelling since then.  She denies any fevers bleeding or drainage.  She states she called the office there today and was instructed to come to the ED.      Review of Systems   Constitutional:  Negative for fever.   Gastrointestinal:         Rectal/perineal pain       Past Medical History:   Diagnosis Date    Allergic     Allergies     to blue dye    Arthritis     Back injury     Diabetes 1.5, 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    Diabetes mellitus 08/2020    New onset    Diabetes mellitus type I     Dyslipidemia     Low back pain     Neck injury     Renal calculi     from calcium    Spinal stenosis     Systolic murmur     UTI (urinary tract infection)     history of       Allergies   Allergen Reactions    Blue Dyes (Parenteral)        Past Surgical History:   Procedure Laterality Date    D & C AND LAPAROSCOPY      ECTOPIC PREGNANCY      WISDOM TOOTH EXTRACTION         Family History   Problem Relation Age of Onset    Heart disease Mother     Diabetes Mother     Arthritis Mother     Supraventricular tachycardia Mother     Atrial fibrillation Mother     Hypertension Father     Diabetes Father     Stroke Father     Hyperlipidemia Father     No Known Problems Brother     Heart disease Maternal Uncle     Diabetes Maternal Grandmother     Diabetes Maternal Grandfather     Heart disease Maternal Grandfather        Social History     Socioeconomic History    Marital status:    Tobacco Use    Smoking status: Former     Current packs/day: 0.50     Average packs/day: 0.5 packs/day for 35.0 years (17.5 ttl pk-yrs)     Types: Cigarettes    Smokeless  tobacco: Never    Tobacco comments:     On and Off   Vaping Use    Vaping status: Never Used   Substance and Sexual Activity    Alcohol use: Yes     Comment: once-twice/yr.--caffeine use    Drug use: No    Sexual activity: Yes     Partners: Male     Birth control/protection: None           Objective   Physical Exam  Vital signs and triage nurse note reviewed.  Constitutional: Awake, alert; well-developed and well-nourished. No acute distress is noted.  Cardiovascular: Regular rate and rhythm, normal S1-S2.  No murmur noted.  Pulmonary: Respiratory effort regular nonlabored, breath sounds clear to auscultation all fields.  Abdomen: Soft, nontender, nondistended with normoactive bowel sounds; no rebound or guarding.  Exam of the perineum reveals a golf ball sized very tender, fluctuant area to the perineum.  There does appear to be some surrounding induration.  There is no crepitus.  No active bleeding or drainage.  Musculoskeletal: Independent range of motion of all extremities with no palpable tenderness or edema.  Skin: Flesh tone, warm, dry, intact; no erythematous or petechial rash or lesion.    Procedures           ED Course      Labs Reviewed   BASIC METABOLIC PANEL - Abnormal; Notable for the following components:       Result Value    Glucose 135 (*)     BUN/Creatinine Ratio 29.0 (*)     Anion Gap 18.3 (*)     All other components within normal limits    Narrative:     GFR Normal >60  Chronic Kidney Disease <60  Kidney Failure <15     CBC WITH AUTO DIFFERENTIAL - Abnormal; Notable for the following components:    WBC 19.00 (*)     Neutrophil % 78.3 (*)     Lymphocyte % 11.3 (*)     Immature Grans % 0.6 (*)     Neutrophils, Absolute 14.87 (*)     Monocytes, Absolute 1.63 (*)     Immature Grans, Absolute 0.12 (*)     All other components within normal limits   CBC AND DIFFERENTIAL    Narrative:     The following orders were created for panel order CBC & Differential.  Procedure                                Abnormality         Status                     ---------                               -----------         ------                     CBC Auto Differential[[id number]]        Abnormal            Final result                 Please view results for these tests on the individual orders.     CT Pelvis With Contrast    Result Date: 11/14/2024  Impression: 1. 6.3 cm anterior perianal/perineal abscess. Electronically Signed: Linda Love MD  11/14/2024 11:30 AM EST  Workstation ID: [id number]   Medications   sodium chloride 0.9 % flush 10 mL (has no administration in time range)   metroNIDAZOLE (FLAGYL) IVPB 500 mg (500 mg Intravenous New Bag 11/14/24 1403)   iopamidol (ISOVUE-370) 76 % injection 100 mL (100 mL Intravenous Given 11/14/24 1123)   cefTRIAXone (ROCEPHIN) 1,000 mg in sodium chloride 0.9 % 100 mL MBP (0 mg Intravenous Stopped 11/14/24 1404)   HYDROmorphone (DILAUDID) injection 0.5 mg (0.5 mg Intravenous Given 11/14/24 1343)                                                      Medical Decision Making  Patient presents today with the above complaint.    She had above exam and evaluation.  IV was established.  Labs and CT were obtained.    Workup: CBC significant for WBC of 19, 78% neutrophils, no bands.  Metabolic panel is grossly unremarkable.  CT of the pelvis was independently interpreted by Dr. Campos and reviewed by myself shows perianal abscess, radiologist reads 6.3 cm anterior perianal/perineal abscess.    Patient was given IV antibiotics.  Case was discussed with Dr. Puente, patient's colorectal surgeon, who came to the ED and saw the patient and would like the patient admitted to him with plans for surgery.    Patient will be admitted to the hospital for further management.  Findings and plan discussed with the patient who is agreeable.  She has remained well-appearing throughout ED stay, afebrile and hemodynamically stable.        Problems Addressed:  Perianal abscess: complicated acute illness or  injury    Amount and/or Complexity of Data Reviewed  Labs: ordered.  Radiology: ordered.    Risk  Prescription drug management.  Decision regarding hospitalization.        Final diagnoses:   Perianal abscess       ED Disposition  ED Disposition       ED Disposition   Decision to Admit    Condition   --    Comment   Level of Care: Med/Surg [1]   Admitting Physician: SULMA VERMA [[id number]]   Attending Physician: SULMA VERMA [[id number]]                 No follow-up provider specified.       Medication List      No changes were made to your prescriptions during this visit.            Loretta Prather, APRN  11/14/24 1544

## 2024-11-14 NOTE — ANESTHESIA PROCEDURE NOTES
Airway  Date/Time: 11/14/2024 6:15 PM    Indications and Patient Condition  Indications for airway management: airway protection    Preoxygenated: yes  MILS maintained throughout  Mask difficulty assessment: 0 - not attempted    Final Airway Details  Final airway type: endotracheal airway      Successful airway: ETT     Successful intubation technique: video laryngoscopy  Facilitating devices/methods: intubating stylet  Endotracheal tube insertion site: oral  Blade: Katz  ETT size (mm): 7.5  Cormack-Lehane Classification: grade I - full view of glottis  Measured from: teeth  ETT/EBT  to teeth (cm): 22  Number of attempts at approach: 1  Assessment: lips, teeth, and gum same as pre-op and atraumatic intubation

## 2024-11-14 NOTE — PROGRESS NOTES
November 14, 2024    Hello, may I speak with Yadira Car?    My name is Shelbi       I am  with [facility]  [facility]  [address] [place] [address] [phone].    Before we get started may I verify your date of birth? 1965    I am calling to officially welcome you to our practice and ask about your recent visit. Is this a good time to talk? yes    Tell me about your visit with us. What things went well?  patient stated that everything went great with visit and had a great experience.        We're always looking for ways to make our patients' experiences even better. Do you have recommendations on ways we may improve?  no    Overall were you satisfied with your first visit to our practice? yes       I appreciate you taking the time to speak with me today. Is there anything else I can do for you? no      Thank you, and have a great day.

## 2024-11-14 NOTE — Clinical Note
November 15, 2024     Patient: Yadira Car   YOB: 1965   Date of Visit: 11/14/2024       To Whom It May Concern:    It is my medical opinion that Yadira Car {Work release (duty restriction):90296}.           Sincerely,        No name on file    CC: No Recipients

## 2024-11-14 NOTE — H&P
History of Present Illness  Yadira Car is a 59 y.o. female who was recently seen in clinic for anal fissure and induration. She presented with abscess and fistula.     CT shows 6 cm abscess by the fissure.      Last colonoscopy: 2018 with a single polyp removed     Past Medical History  Medical History        Past Medical History:   Diagnosis Date    Allergic      Allergies       to blue dye    Arthritis      Back injury      Diabetes 1.5, 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    Diabetes mellitus 08/2020     New onset    Diabetes mellitus type I      Dyslipidemia      Low back pain      Neck injury      Renal calculi       from calcium    Spinal stenosis      Systolic murmur      UTI (urinary tract infection)       history of         Past Surgical History  Surgical History         Past Surgical History:   Procedure Laterality Date    D & C AND LAPAROSCOPY        ECTOPIC PREGNANCY        WISDOM TOOTH EXTRACTION             Family History        Family History   Problem Relation Age of Onset    Heart disease Mother      Diabetes Mother      Arthritis Mother      Supraventricular tachycardia Mother      Atrial fibrillation Mother      Hypertension Father      Diabetes Father      Stroke Father      Hyperlipidemia Father      No Known Problems Brother      Heart disease Maternal Uncle      Diabetes Maternal Grandmother      Diabetes Maternal Grandfather      Heart disease Maternal Grandfather        No colorectal cancer history in immediate family members.  Social History  Social History   Social History            Tobacco Use    Smoking status: Former       Current packs/day: 0.50       Average packs/day: 0.5 packs/day for 35.0 years (17.5 ttl pk-yrs)       Types: Cigarettes    Smokeless tobacco: Never    Tobacco comments:       On and Off   Vaping Use    Vaping status: Never Used   Substance Use Topics    Alcohol use: Yes       Comment: once-twice/yr.--caffeine use    Drug use: No         For further details please see Health  History Questionnaire scanned in Epic.  Medication List  [unfilled]  Allergies  Allergies        Allergies   Allergen Reactions    Blue Dyes (Parenteral)           Review of Systems  All systems reviewed and are otherwise negative, pertinent positives noted in the HPI and Health History Questionnaire scanned in Epic.     Physical Exam  General:  No acute distress  Head: Normocephalic, atraumatic  Neuro: Alert and oriented     Abdomen:  Soft, non-tender, non-distended, no hernias, no hepatomegaly, no splenomegaly. No abnormal, audible bowel sounds.     External anorectal exam: Multiple perianal skin tag, thrombosed external hemorrhoids in the left lateral.  Anterior midline anal fissure noted on distraction of buttocks with exposed internal sphincter. There is  associated skin tag.  There is induration in the perineum and fullness.  There is no erythema or fluctuant collection.  There is no external fistulous opening.  Digital rectal:  tenderness with exam, no palpable masses, tone is slightly increased.     Procedure Note  Procedure: anoscopy  Indication: rectal pain  Description: After digital examination was completed the scope was inserted into the anal canal. The rectum/anus was visualized to 5cm. See Findings below. The scope was then removed. Patient tolerated procedure well.  Findings: Anterior anal fissure is seen. Prominent, internal hemorrhoids in the classic quadrants, otherwise no other mucosal lesions.          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 We will proceed to the operating room for incision and drainage of abscess and possible fistulotomy and sphincterotomy      2. We discussed the risk of the operation which include, but are not limited to: bleeding, wound infection, pelvic infection, urinary retention possibly requiring a james catheter. Patient understands there is always the risk of incontinence to gas or stool with any anorectal  surgery, but great pains will be taken to minimize this risk. Further treatments or operations may be necessary as the situation dictates.    Patient demonstrates understanding and all questions are answered. Patient consents to go ahead with proposed procedure.      Natalio Puente MD  Colon and Rectal Surgery   [facility]

## 2024-11-15 VITALS
HEART RATE: 76 BPM | WEIGHT: 170 LBS | SYSTOLIC BLOOD PRESSURE: 134 MMHG | OXYGEN SATURATION: 93 % | RESPIRATION RATE: 10 BRPM | DIASTOLIC BLOOD PRESSURE: 77 MMHG | HEIGHT: 68 IN | BODY MASS INDEX: 25.76 KG/M2 | TEMPERATURE: 98.1 F

## 2024-11-15 LAB
GLUCOSE BLDC GLUCOMTR-MCNC: 131 MG/DL (ref 70–105)
GLUCOSE BLDC GLUCOMTR-MCNC: 163 MG/DL (ref 70–105)

## 2024-11-15 PROCEDURE — G0378 HOSPITAL OBSERVATION PER HR: HCPCS

## 2024-11-15 PROCEDURE — 82948 REAGENT STRIP/BLOOD GLUCOSE: CPT | Performed by: STUDENT IN AN ORGANIZED HEALTH CARE EDUCATION/TRAINING PROGRAM

## 2024-11-15 PROCEDURE — 25010000002 AMPICILLIN-SULBACTAM PER 1.5 G: Performed by: STUDENT IN AN ORGANIZED HEALTH CARE EDUCATION/TRAINING PROGRAM

## 2024-11-15 PROCEDURE — 25010000002 HEPARIN (PORCINE) PER 1000 UNITS: Performed by: STUDENT IN AN ORGANIZED HEALTH CARE EDUCATION/TRAINING PROGRAM

## 2024-11-15 RX ORDER — OXYCODONE HYDROCHLORIDE 5 MG/1
5 TABLET ORAL EVERY 8 HOURS PRN
Qty: 30 TABLET | Refills: 0 | Status: SHIPPED | OUTPATIENT
Start: 2024-11-15 | End: 2024-12-15

## 2024-11-15 RX ADMIN — AMPICILLIN SODIUM AND SULBACTAM SODIUM 3 G: 2; 1 INJECTION, POWDER, FOR SOLUTION INTRAMUSCULAR; INTRAVENOUS at 09:08

## 2024-11-15 RX ADMIN — EMPAGLIFLOZIN 25 MG: 25 TABLET, FILM COATED ORAL at 09:08

## 2024-11-15 RX ADMIN — Medication 10 ML: at 09:08

## 2024-11-15 RX ADMIN — HEPARIN SODIUM 5000 UNITS: 5000 INJECTION INTRAVENOUS; SUBCUTANEOUS at 06:17

## 2024-11-15 RX ADMIN — AMPICILLIN SODIUM AND SULBACTAM SODIUM 3 G: 2; 1 INJECTION, POWDER, FOR SOLUTION INTRAMUSCULAR; INTRAVENOUS at 02:31

## 2024-11-15 NOTE — ANESTHESIA POSTPROCEDURE EVALUATION
Patient: Yadira Car    Procedure Summary       Date: 11/14/24 Room / Location: [facility] OR 04 / [facility] MAIN OR    Anesthesia Start: 1812 Anesthesia Stop: 1919    Procedure: EXAM UNDER ANESTHESIA, INCISION AND DRAINAGE PERIANAL ABSCESS, FISULOTOMY WITH SETON, FISSURECTOMY Diagnosis:     Surgeons: Natalio Puente MD Provider: Kaleb Esquivel MD    Anesthesia Type: general ASA Status: 3            Anesthesia Type: general    Vitals  Vitals Value Taken Time   /73 11/14/24 1943   Temp 98.7 °F (37.1 °C) 11/14/24 1940   Pulse 88 11/14/24 1943   Resp 14 11/14/24 1940   SpO2 94 % 11/14/24 1943   Vitals shown include unfiled device data.        Post Anesthesia Care and Evaluation    Patient location during evaluation: PACU  Patient participation: complete - patient participated  Level of consciousness: awake  Pain scale: See nurse's notes for pain score.  Pain management: adequate    Airway patency: patent  Anesthetic complications: No anesthetic complications  PONV Status: none  Cardiovascular status: acceptable  Respiratory status: acceptable and spontaneous ventilation  Hydration status: acceptable    Comments: Patient seen and examined postoperatively; vital signs stable; SpO2 greater than or equal to 90%; cardiopulmonary status stable; nausea/vomiting adequately controlled; pain adequately controlled; no apparent anesthesia complications; patient discharged from anesthesia care when discharge criteria were met

## 2024-11-15 NOTE — DISCHARGE SUMMARY
Date of Admission: 11/14/2024  Date of Discharge:  11/15/2024  Primary Care Physician: Jennifer Lee MD     Discharge Diagnosis:  Active Hospital Problems    Diagnosis  POA    **Anal fissure and fistula [K60.2, K60.30]  Yes      Resolved Hospital Problems   No resolved problems to display.       Presenting Problem/History of Present Illness:  Perianal abscess [K61.0]  Anal fissure and fistula [K60.2, K60.30]     Hospital Course:  The patient is a 59 y.o. female who presented with  anal fissure and fistula with abscess collection     Exam Today: improved     Procedures Performed:  Procedure(s):  EXAM UNDER ANESTHESIA, INCISION AND DRAINAGE PERIANAL ABSCESS, FISULOTOMY WITH SETON, FISSURECTOMY       Consults:   Consults       No orders found for last 30 day(s).             Discharge Disposition:  Home or Self Care    Discharge Medications:     Discharge Medications        New Medications        Instructions Start Date   oxyCODONE 5 MG immediate release tablet  Commonly known as: Roxicodone   5 mg, Oral, Every 8 Hours PRN             Continue These Medications        Instructions Start Date   amoxicillin 500 MG capsule  Commonly known as: AMOXIL   Take 1 capsule by mouth 3 times a day until gone      B12 FOLATE PO   Take  by mouth. b12 spray .. spray on tongue three times week      ciclopirox 0.77 % cream  Commonly known as: LOPROX   Apply sparingly two times per day to the toe nails      Diclofenac Sodium 1 % gel gel  Commonly known as: Voltaren   1 g, Topical, 4 Times Daily      diphenhydrAMINE 25 mg capsule  Commonly known as: BENADRYL   25 mg, Every 6 Hours PRN      fluconazole 100 MG tablet  Commonly known as: Diflucan   100 mg, Oral, Daily      ANDREA ROOT PO   1 tablet, Oral, Daily      ibuprofen 200 MG tablet  Commonly known as: ADVIL,MOTRIN   200 mg, Every 6 Hours PRN      Jardiance 25 MG tablet tablet  Generic drug: empagliflozin   25 mg, Oral, Daily, Stop Xigduo.      lidocaine 2 % jelly  Commonly known  as: XYLOCAINE   0.5% Nifedipine with 2% lidocaine, apply to rectum 3-4 times daily      MAGNESIUM GLYCINATE PO   1 tablet, Oral, Daily      NON FORMULARY   Sovereign silver  Take one three times weekly      rosuvastatin 10 MG tablet  Commonly known as: CRESTOR   Take 1 tablet by mouth Daily.DO LABS IN NOV  AND SET UP APPT WITH DR WASHBURN IN NOV ALSO      SINUTAB SINUS PO   1 tablet, Oral, Daily      TURMERIC COMPLEX/BLACK PEPPER PO   1 tablet, Oral, Daily      VITAMIN C PO   1,000 mg, Oral, Daily      VITAMIN D-3 PO   1,000 Units, Oral, Daily      VITAMIN E PO   400 Units, Oral, Daily               Discharge Diet:   Diet Instructions    Regular diet            Activity at Discharge:     Follow-up Appointments:  Future Appointments   Date Time Provider Department Center   12/9/2024 12:30 PM Natalio Puente MD [facility]   12/13/2024  8:45 AM Natalio Puente MD [facility]   12/20/2024  2:45 PM Ally Thompson MD [id number] None     Additional Instructions for the Follow-ups that You Need to Schedule    Resume activity as tolerated            Test Results Pending at Discharge:  Pending Results       None             Natalio Puente MD  11/15/24  18:24 EST    Time Spent on Discharge Activities: 10min

## 2024-11-15 NOTE — OP NOTE
[facility] Operative Note   Name: Yadira Car  : 1965  Date of Surgery: 2024  Pre-op Diagnosis: Fistula in ano, fissure, perianal abscess   Post-op Diagnosis: same  Procedure:   Incision and drainage of abscess   seton drain placement   Fissurectomy   Surgeon: Natalio Puente MD   Assistants: CULLEN   Anesthesia: Local/General   IV Fluids: refer to anesthesia record  Estimated Blood Loss: minimal  Drains: seton drain  Implants: silastic seton drain  Specimen: abscess for culture   Findings   1. 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 female with history of chronic anal fissure who presented with anterior midline perineal abscess concerning for a fistula in ano.     Description of Procedure  Informed consent was obtained and the patient was taken to the OR.  Anesthesia was administered. Bhavna prominences were padded. The patient was place in the prone position. The anus was prepped and draped in sterile fashion.  A local anal block was performed.    On inspection, the peritoneum has a palpable fluctuant collection.  There was surrounding erythema.  A curvilinear close to the anus small incision was made.  A copious amount of foul-smelling myrna purulent fluid was expressed.  Over 40 cc of purulent fluid was drained.  The cavity was irrigated with Irrisept.    An obvious chronic anterior midline fissure was noted.    A fistula probe was then used and was noted to have an internal opening at the base of the fissure.  The fistula involves both internal and external sphincters.    Even though there is an option to do advancement flap to treat both the fissure and the fistula, I elected to reserve this option for later operation.  The tissue was inflamed and friable.    I curetted the fissure.  Isolated a portion of the internal sphincter and a  sphincterotomy was performed without compromising sphincter tone.  To avoid a keyhole defect I reserved further intervention after the infection has drained and improved.    Due to this, a seton drain was placed through the fistula using a silastic vessel loop and tied to itself using a silk tie.    Hemostasis was confirmed.    Dressings were applied and the case was concluded.  Counts: Instrument, sponge, and needle counts were reported by the scrub nurse to be correct at the conclusion of the case.  Disposition  The patient was taken to Recovery Room in good condition.        Natalio Puente MD  Colon and Rectal Surgery   [facility]

## 2024-11-15 NOTE — ANESTHESIA PREPROCEDURE EVALUATION
Anesthesia Evaluation     NPO Solid Status: > 8 hours  NPO Liquid Status: > 8 hours           Airway   Mallampati: II  TM distance: >3 FB  Neck ROM: full  No difficulty expected  Dental - normal exam     Pulmonary - normal exam   Cardiovascular - normal exam    (+) valvular problems/murmurs, CHF , hyperlipidemia      Neuro/Psych  (+) numbness  GI/Hepatic/Renal/Endo    (+) renal disease-, diabetes mellitus    Musculoskeletal     Abdominal  - normal exam    Bowel sounds: normal.   Substance History      OB/GYN          Other   arthritis,                 Anesthesia Plan    ASA 3     general     intravenous induction     Anesthetic plan, risks, benefits, and alternatives have been provided, discussed and informed consent has been obtained with: patient.  Pre-procedure education provided  Plan discussed with CRNA and CAA.    CODE STATUS:    Level Of Support Discussed With: Patient  Code Status (Patient has no pulse and is not breathing): CPR (Attempt to Resuscitate)  Medical Interventions (Patient has pulse or is breathing): Full Support

## 2024-11-15 NOTE — PLAN OF CARE
Goal Outcome Evaluation:                               Pt up to [facility] from ED and shortly went right down to OR for surgery.

## 2024-11-15 NOTE — PLAN OF CARE
Goal Outcome Evaluation:      No complaints of pain. Surgical site covered with fluff, abd pad, and held in place with mesh panties. Ambulated to and from restroom with stand by assist, tolerated well. Call light within reach, plan of care on going.

## 2024-11-15 NOTE — CASE MANAGEMENT/SOCIAL WORK
Discharge Planning Assessment   Deric     Patient Name: Yadira Car  MRN: [id number]  Today's Date: 11/15/2024    Admit Date: 11/14/2024    Plan: Home. Family can transport at discharge.   Discharge Needs Assessment       Row Name 11/15/24 1639       Living Environment    People in Home alone    Current Living Arrangements home    Potentially Unsafe Housing Conditions none    In the past 12 months has the electric, gas, oil, or water company threatened to shut off services in your home? No    Primary Care Provided by self    Provides Primary Care For no one    Family Caregiver if Needed other relative(s)    Quality of Family Relationships helpful;involved;supportive    Able to Return to Prior Arrangements yes       Resource/Environmental Concerns    Resource/Environmental Concerns none    Transportation Concerns none       Transportation Needs    In the past 12 months, has lack of transportation kept you from medical appointments or from getting medications? no    In the past 12 months, has lack of transportation kept you from meetings, work, or from getting things needed for daily living? No       Food Insecurity    Within the past 12 months, you worried that your food would run out before you got the money to buy more. Never true    Within the past 12 months, the food you bought just didn't last and you didn't have money to get more. Never true       Transition Planning    Patient/Family Anticipates Transition to home    Patient/Family Anticipated Services at Transition none    Transportation Anticipated family or friend will provide       Discharge Needs Assessment    Readmission Within the Last 30 Days no previous admission in last 30 days    Equipment Currently Used at Home glucometer    Concerns to be Addressed care coordination/care conferences;discharge planning    Do you want help finding or keeping work or a job? I do not need or want help    Do you want help with school or training? For example,  starting or completing job training or getting a high school diploma, GED or equivalent No    Anticipated Changes Related to Illness none    Equipment Needed After Discharge none    Provided Post Acute Provider List? N/A    N/A Provider List Comment DENIES DC NEEDS                   Discharge Plan       Row Name 11/15/24 1637       Plan    Plan Home. Family can transport at discharge.    Patient/Family in Agreement with Plan yes    Plan Comments Patient lives at home with alone.  Patient normally drives and works. Family  will transport at discharge. Patient performs ADL. PCP and pharmacy confirmed. Denies financial assistance needs for medication and/or food. Denies HH and/or rehab needs.                  Continued Care and Services - Discharged on 11/15/2024 Admission date: 11/14/2024 - Discharge disposition: Home or Self Care   No active coordination exists for this encounter.       Selected Continued Care - Episodes Includes continued care and service providers with selected services from the active episodes listed below      Rising Risk Care Management Episode start date: 11/15/2024   There are no active outsourced providers for this episode.                 Expected Discharge Date and Time       Expected Discharge Date Expected Discharge Time    Nov 15, 2024            Demographic Summary       Row Name 11/15/24 5506       General Information    Admission Type observation    Arrived From emergency department    Referral Source admission list    Reason for Consult discharge planning    Preferred Language English       Contact Information    Permission Granted to Share Info With                    Functional Status       Row Name 11/15/24 1636       Functional Status    Usual Activity Tolerance good    Current Activity Tolerance good       Functional Status, IADL    Medications independent    Meal Preparation independent    Housekeeping independent    Laundry independent    Shopping independent        Mental Status    General Appearance WDL WDL       Mental Status Summary    Recent Changes in Mental Status/Cognitive Functioning no changes                        Lashay Esparza RN    [facility]  [email or web address]  Office [phone]  Cell [phone]

## 2024-11-15 NOTE — CASE MANAGEMENT/SOCIAL WORK
Case Management Discharge Note      Final Note: HOME    Provided Post Acute Provider List?: N/A  N/A Provider List Comment: DENIES DC NEEDS    Selected Continued Care - Discharged on 11/15/2024 Admission date: 11/14/2024 - Discharge disposition: Home or Self Care            Transportation Services  Private: Car    Final Discharge Disposition Code: 01 - home or self-care

## 2024-11-18 LAB
BACTERIA SPEC AEROBE CULT: ABNORMAL
GRAM STN SPEC: ABNORMAL

## 2024-11-20 ENCOUNTER — PATIENT OUTREACH (OUTPATIENT)
Dept: CASE MANAGEMENT | Facility: OTHER | Age: 59
End: 2024-11-20
Payer: COMMERCIAL

## 2024-11-20 NOTE — OUTREACH NOTE
AMBULATORY CASE MANAGEMENT NOTE    Names and Relationships of Patient/Support Persons: Contact: Yadira Car; Relationship: Self -     Patient Outreach    Telephone call with patient.  Patient reports no needs or issues at this time.  Discussed diabetes management and patient reports she has a glucometer from [facility] and checks blood glucose. Patient reports she returned to work today after a recent surgery and doing much better.  She declined Employee Case Management.  Encouraged her to reach out in the future if needed.    Education Documentation  No documentation found.        ANIA KO  Ambulatory Case Management    11/20/2024, 13:53 EST

## 2024-12-09 ENCOUNTER — OFFICE VISIT (OUTPATIENT)
Age: 59
End: 2024-12-09
Payer: COMMERCIAL

## 2024-12-09 VITALS
DIASTOLIC BLOOD PRESSURE: 87 MMHG | OXYGEN SATURATION: 99 % | TEMPERATURE: 98.2 F | BODY MASS INDEX: 26.98 KG/M2 | SYSTOLIC BLOOD PRESSURE: 140 MMHG | HEART RATE: 69 BPM | HEIGHT: 68 IN | WEIGHT: 178 LBS

## 2024-12-09 DIAGNOSIS — K60.2 ANAL FISSURE: ICD-10-CM

## 2024-12-09 DIAGNOSIS — K60.2 ANAL FISSURE AND FISTULA: ICD-10-CM

## 2024-12-09 DIAGNOSIS — K60.30 ANAL FISSURE AND FISTULA: ICD-10-CM

## 2024-12-09 DIAGNOSIS — K64.8 INTERNAL HEMORRHOIDS WITH COMPLICATION: Primary | ICD-10-CM

## 2024-12-09 PROCEDURE — 99024 POSTOP FOLLOW-UP VISIT: CPT | Performed by: STUDENT IN AN ORGANIZED HEALTH CARE EDUCATION/TRAINING PROGRAM

## 2024-12-09 RX ORDER — SODIUM CHLORIDE 0.9 % (FLUSH) 0.9 %
3-10 SYRINGE (ML) INJECTION AS NEEDED
OUTPATIENT
Start: 2024-12-09

## 2024-12-09 RX ORDER — SODIUM CHLORIDE 0.9 % (FLUSH) 0.9 %
3 SYRINGE (ML) INJECTION EVERY 12 HOURS SCHEDULED
OUTPATIENT
Start: 2024-12-09

## 2024-12-09 RX ORDER — SODIUM CHLORIDE 9 MG/ML
40 INJECTION, SOLUTION INTRAVENOUS AS NEEDED
OUTPATIENT
Start: 2024-12-09

## 2024-12-09 NOTE — PROGRESS NOTES
Colorectal Surgery Followup Note    ID:  Yadira Car;   : 1965  DATE OF VISIT: 2024    Chief Complaint  Post-op (PO EXAM UNDER ANESTHESIA, INCISION AND DRAINAGE PERIANAL ABSCESS, FISULOTOMY WITH SETON, FISSURECTOMY )       Subjective    Yadira is s/p abscess drainage and seton placement. She is doing well. Reports minimal drainage. Reports minimal discomfort. Denies any bleeding.   Exam  General:  No acute distress  Head: Normocephalic, atraumatic  Neuro: Alert and oriented      External anorectal exam: minimal perianal irritation, seton intact, no surrounding inflammation   Assessment  - anal fissure  - transsphincteric fistula in ano     Plan / Recommendations  - Doing well.   - We discussed surgical options to close her fistula, including modified LIFT, LIFT or mucosal advancement flap. Mucosal advancement likely will be a better choice give deep fissure with fistula involving base  - we will schedule surgery after 4 weeks time   - follow up at the time of surgery        Natalio Puente MD  Colon and Rectal Surgery   [facility]

## 2024-12-18 ENCOUNTER — HOSPITAL ENCOUNTER (OUTPATIENT)
Dept: CARDIOLOGY | Facility: HOSPITAL | Age: 59
Discharge: HOME | End: 2024-12-18
Payer: COMMERCIAL

## 2024-12-18 ENCOUNTER — LAB (OUTPATIENT)
Dept: LAB | Facility: HOSPITAL | Age: 59
End: 2024-12-18
Payer: COMMERCIAL

## 2024-12-18 LAB
HBA1C MFR BLD: 8.2 % (ref 4.8–5.6)
QT INTERVAL: 398 MS
QTC INTERVAL: 439 MS

## 2024-12-18 PROCEDURE — 83036 HEMOGLOBIN GLYCOSYLATED A1C: CPT | Performed by: STUDENT IN AN ORGANIZED HEALTH CARE EDUCATION/TRAINING PROGRAM

## 2024-12-18 PROCEDURE — 93005 ELECTROCARDIOGRAM TRACING: CPT | Performed by: STUDENT IN AN ORGANIZED HEALTH CARE EDUCATION/TRAINING PROGRAM

## 2024-12-18 PROCEDURE — 36415 COLL VENOUS BLD VENIPUNCTURE: CPT | Performed by: STUDENT IN AN ORGANIZED HEALTH CARE EDUCATION/TRAINING PROGRAM

## 2024-12-19 NOTE — PAT
Message sent to Dr. Puente about A1c = 8.20.  Awaiting response.    He said ok to proceed with surgery on 1/9/2024.

## 2025-01-08 ENCOUNTER — ANESTHESIA EVENT (OUTPATIENT)
Dept: PERIOP | Facility: HOSPITAL | Age: 60
End: 2025-01-08
Payer: COMMERCIAL

## 2025-01-09 ENCOUNTER — ANESTHESIA (OUTPATIENT)
Dept: PERIOP | Facility: HOSPITAL | Age: 60
End: 2025-01-09
Payer: COMMERCIAL

## 2025-01-09 ENCOUNTER — HOSPITAL ENCOUNTER (OUTPATIENT)
Facility: HOSPITAL | Age: 60
Discharge: HOME | End: 2025-01-09
Attending: STUDENT IN AN ORGANIZED HEALTH CARE EDUCATION/TRAINING PROGRAM | Admitting: STUDENT IN AN ORGANIZED HEALTH CARE EDUCATION/TRAINING PROGRAM
Payer: COMMERCIAL

## 2025-01-09 VITALS
WEIGHT: 174.6 LBS | OXYGEN SATURATION: 99 % | BODY MASS INDEX: 26.46 KG/M2 | TEMPERATURE: 97.8 F | RESPIRATION RATE: 14 BRPM | SYSTOLIC BLOOD PRESSURE: 167 MMHG | HEART RATE: 79 BPM | DIASTOLIC BLOOD PRESSURE: 86 MMHG | HEIGHT: 68 IN

## 2025-01-09 DIAGNOSIS — K60.2 ANAL FISSURE AND FISTULA: ICD-10-CM

## 2025-01-09 DIAGNOSIS — K60.30 ANAL FISSURE AND FISTULA: ICD-10-CM

## 2025-01-09 LAB
GLUCOSE BLDC GLUCOMTR-MCNC: 121 MG/DL (ref 70–105)
GLUCOSE BLDC GLUCOMTR-MCNC: 133 MG/DL (ref 70–105)
GLUCOSE BLDC GLUCOMTR-MCNC: 177 MG/DL (ref 70–105)

## 2025-01-09 PROCEDURE — 25010000002 ONDANSETRON PER 1 MG: Performed by: NURSE ANESTHETIST, CERTIFIED REGISTERED

## 2025-01-09 PROCEDURE — 82948 REAGENT STRIP/BLOOD GLUCOSE: CPT

## 2025-01-09 PROCEDURE — 46280 REMOVE ANAL FIST COMPLEX: CPT | Performed by: STUDENT IN AN ORGANIZED HEALTH CARE EDUCATION/TRAINING PROGRAM

## 2025-01-09 PROCEDURE — 25010000002 SUGAMMADEX 200 MG/2ML SOLUTION: Performed by: NURSE ANESTHETIST, CERTIFIED REGISTERED

## 2025-01-09 PROCEDURE — 25010000002 BUPIVACAINE 0.25 % SOLUTION: Performed by: STUDENT IN AN ORGANIZED HEALTH CARE EDUCATION/TRAINING PROGRAM

## 2025-01-09 PROCEDURE — 25810000003 LACTATED RINGERS PER 1000 ML: Performed by: ANESTHESIOLOGY

## 2025-01-09 PROCEDURE — 25010000002 LIDOCAINE PF 2% 2 % SOLUTION: Performed by: NURSE ANESTHETIST, CERTIFIED REGISTERED

## 2025-01-09 PROCEDURE — 25010000002 PROPOFOL 200 MG/20ML EMULSION: Performed by: NURSE ANESTHETIST, CERTIFIED REGISTERED

## 2025-01-09 PROCEDURE — 25010000002 BUPIVACAINE LIPOSOME 1.3 % SUSPENSION: Performed by: STUDENT IN AN ORGANIZED HEALTH CARE EDUCATION/TRAINING PROGRAM

## 2025-01-09 PROCEDURE — 25010000002 DEXAMETHASONE PER 1 MG: Performed by: NURSE ANESTHETIST, CERTIFIED REGISTERED

## 2025-01-09 PROCEDURE — 25010000002 FENTANYL CITRATE (PF) 50 MCG/ML SOLUTION: Performed by: NURSE ANESTHETIST, CERTIFIED REGISTERED

## 2025-01-09 RX ORDER — OXYCODONE HYDROCHLORIDE 5 MG/1
5 TABLET ORAL ONCE AS NEEDED
Status: DISCONTINUED | OUTPATIENT
Start: 2025-01-09 | End: 2025-01-09 | Stop reason: HOSPADM

## 2025-01-09 RX ORDER — BUPIVACAINE HYDROCHLORIDE 2.5 MG/ML
INJECTION, SOLUTION INFILTRATION; PERINEURAL AS NEEDED
Status: DISCONTINUED | OUTPATIENT
Start: 2025-01-09 | End: 2025-01-09 | Stop reason: HOSPADM

## 2025-01-09 RX ORDER — FENTANYL CITRATE 50 UG/ML
INJECTION, SOLUTION INTRAMUSCULAR; INTRAVENOUS AS NEEDED
Status: DISCONTINUED | OUTPATIENT
Start: 2025-01-09 | End: 2025-01-09 | Stop reason: SURG

## 2025-01-09 RX ORDER — ONDANSETRON 2 MG/ML
4 INJECTION INTRAMUSCULAR; INTRAVENOUS ONCE AS NEEDED
Status: COMPLETED | OUTPATIENT
Start: 2025-01-09 | End: 2025-01-09

## 2025-01-09 RX ORDER — FLUMAZENIL 0.1 MG/ML
0.2 INJECTION INTRAVENOUS AS NEEDED
Status: DISCONTINUED | OUTPATIENT
Start: 2025-01-09 | End: 2025-01-09 | Stop reason: HOSPADM

## 2025-01-09 RX ORDER — PROPOFOL 10 MG/ML
INJECTION, EMULSION INTRAVENOUS AS NEEDED
Status: DISCONTINUED | OUTPATIENT
Start: 2025-01-09 | End: 2025-01-09 | Stop reason: SURG

## 2025-01-09 RX ORDER — ROCURONIUM BROMIDE 10 MG/ML
INJECTION, SOLUTION INTRAVENOUS AS NEEDED
Status: DISCONTINUED | OUTPATIENT
Start: 2025-01-09 | End: 2025-01-09 | Stop reason: SURG

## 2025-01-09 RX ORDER — OXYCODONE HYDROCHLORIDE 5 MG/1
5 TABLET ORAL EVERY 8 HOURS PRN
Qty: 30 TABLET | Refills: 0 | Status: SHIPPED | OUTPATIENT
Start: 2025-01-09 | End: 2025-02-08

## 2025-01-09 RX ORDER — SODIUM CHLORIDE 9 MG/ML
40 INJECTION, SOLUTION INTRAVENOUS AS NEEDED
Status: DISCONTINUED | OUTPATIENT
Start: 2025-01-09 | End: 2025-01-09 | Stop reason: HOSPADM

## 2025-01-09 RX ORDER — NALOXONE HCL 0.4 MG/ML
0.4 VIAL (ML) INJECTION AS NEEDED
Status: DISCONTINUED | OUTPATIENT
Start: 2025-01-09 | End: 2025-01-09 | Stop reason: HOSPADM

## 2025-01-09 RX ORDER — DIPHENHYDRAMINE HYDROCHLORIDE 50 MG/ML
12.5 INJECTION INTRAMUSCULAR; INTRAVENOUS ONCE AS NEEDED
Status: DISCONTINUED | OUTPATIENT
Start: 2025-01-09 | End: 2025-01-09 | Stop reason: HOSPADM

## 2025-01-09 RX ORDER — ONDANSETRON 2 MG/ML
INJECTION INTRAMUSCULAR; INTRAVENOUS AS NEEDED
Status: DISCONTINUED | OUTPATIENT
Start: 2025-01-09 | End: 2025-01-09 | Stop reason: SURG

## 2025-01-09 RX ORDER — MEPERIDINE HYDROCHLORIDE 25 MG/ML
12.5 INJECTION INTRAMUSCULAR; INTRAVENOUS; SUBCUTANEOUS
Status: DISCONTINUED | OUTPATIENT
Start: 2025-01-09 | End: 2025-01-09 | Stop reason: HOSPADM

## 2025-01-09 RX ORDER — POVIDONE-IODINE 10 MG/G
OINTMENT TOPICAL AS NEEDED
Status: DISCONTINUED | OUTPATIENT
Start: 2025-01-09 | End: 2025-01-09 | Stop reason: HOSPADM

## 2025-01-09 RX ORDER — DEXAMETHASONE SODIUM PHOSPHATE 4 MG/ML
INJECTION, SOLUTION INTRA-ARTICULAR; INTRALESIONAL; INTRAMUSCULAR; INTRAVENOUS; SOFT TISSUE AS NEEDED
Status: DISCONTINUED | OUTPATIENT
Start: 2025-01-09 | End: 2025-01-09 | Stop reason: SURG

## 2025-01-09 RX ORDER — SODIUM CHLORIDE 0.9 % (FLUSH) 0.9 %
10 SYRINGE (ML) INJECTION AS NEEDED
Status: DISCONTINUED | OUTPATIENT
Start: 2025-01-09 | End: 2025-01-09 | Stop reason: HOSPADM

## 2025-01-09 RX ORDER — EPHEDRINE SULFATE 5 MG/ML
5 INJECTION INTRAVENOUS ONCE AS NEEDED
Status: DISCONTINUED | OUTPATIENT
Start: 2025-01-09 | End: 2025-01-09 | Stop reason: HOSPADM

## 2025-01-09 RX ORDER — LIDOCAINE HYDROCHLORIDE 10 MG/ML
0.5 INJECTION, SOLUTION EPIDURAL; INFILTRATION; INTRACAUDAL; PERINEURAL ONCE AS NEEDED
Status: DISCONTINUED | OUTPATIENT
Start: 2025-01-09 | End: 2025-01-09 | Stop reason: HOSPADM

## 2025-01-09 RX ORDER — LIDOCAINE HYDROCHLORIDE 20 MG/ML
INJECTION, SOLUTION EPIDURAL; INFILTRATION; INTRACAUDAL; PERINEURAL AS NEEDED
Status: DISCONTINUED | OUTPATIENT
Start: 2025-01-09 | End: 2025-01-09 | Stop reason: SURG

## 2025-01-09 RX ORDER — SODIUM CHLORIDE 0.9 % (FLUSH) 0.9 %
3-10 SYRINGE (ML) INJECTION AS NEEDED
Status: DISCONTINUED | OUTPATIENT
Start: 2025-01-09 | End: 2025-01-09 | Stop reason: HOSPADM

## 2025-01-09 RX ORDER — SODIUM CHLORIDE, SODIUM LACTATE, POTASSIUM CHLORIDE, CALCIUM CHLORIDE 600; 310; 30; 20 MG/100ML; MG/100ML; MG/100ML; MG/100ML
1000 INJECTION, SOLUTION INTRAVENOUS CONTINUOUS
Status: DISPENSED | OUTPATIENT
Start: 2025-01-09 | End: 2025-01-09

## 2025-01-09 RX ORDER — SODIUM CHLORIDE 0.9 % (FLUSH) 0.9 %
3 SYRINGE (ML) INJECTION EVERY 12 HOURS SCHEDULED
Status: DISCONTINUED | OUTPATIENT
Start: 2025-01-09 | End: 2025-01-09 | Stop reason: HOSPADM

## 2025-01-09 RX ORDER — OXYCODONE HYDROCHLORIDE 5 MG/1
10 TABLET ORAL EVERY 4 HOURS PRN
Status: DISCONTINUED | OUTPATIENT
Start: 2025-01-09 | End: 2025-01-09 | Stop reason: HOSPADM

## 2025-01-09 RX ORDER — HYDRALAZINE HYDROCHLORIDE 20 MG/ML
5 INJECTION INTRAMUSCULAR; INTRAVENOUS
Status: DISCONTINUED | OUTPATIENT
Start: 2025-01-09 | End: 2025-01-09 | Stop reason: HOSPADM

## 2025-01-09 RX ORDER — IPRATROPIUM BROMIDE AND ALBUTEROL SULFATE 2.5; .5 MG/3ML; MG/3ML
3 SOLUTION RESPIRATORY (INHALATION) ONCE AS NEEDED
Status: DISCONTINUED | OUTPATIENT
Start: 2025-01-09 | End: 2025-01-09 | Stop reason: HOSPADM

## 2025-01-09 RX ORDER — LABETALOL HYDROCHLORIDE 5 MG/ML
5 INJECTION, SOLUTION INTRAVENOUS
Status: DISCONTINUED | OUTPATIENT
Start: 2025-01-09 | End: 2025-01-09 | Stop reason: HOSPADM

## 2025-01-09 RX ORDER — DIPHENHYDRAMINE HYDROCHLORIDE 50 MG/ML
12.5 INJECTION INTRAMUSCULAR; INTRAVENOUS
Status: DISCONTINUED | OUTPATIENT
Start: 2025-01-09 | End: 2025-01-09 | Stop reason: HOSPADM

## 2025-01-09 RX ADMIN — PROPOFOL 200 MG: 10 INJECTION, EMULSION INTRAVENOUS at 14:02

## 2025-01-09 RX ADMIN — ONDANSETRON 4 MG: 2 INJECTION INTRAMUSCULAR; INTRAVENOUS at 15:12

## 2025-01-09 RX ADMIN — FENTANYL CITRATE 50 MCG: 50 INJECTION, SOLUTION INTRAMUSCULAR; INTRAVENOUS at 14:37

## 2025-01-09 RX ADMIN — FENTANYL CITRATE 50 MCG: 50 INJECTION, SOLUTION INTRAMUSCULAR; INTRAVENOUS at 14:02

## 2025-01-09 RX ADMIN — ROCURONIUM BROMIDE 50 MG: 10 INJECTION, SOLUTION INTRAVENOUS at 14:02

## 2025-01-09 RX ADMIN — ONDANSETRON 4 MG: 2 INJECTION INTRAMUSCULAR; INTRAVENOUS at 14:15

## 2025-01-09 RX ADMIN — SUGAMMADEX 200 MG: 100 INJECTION, SOLUTION INTRAVENOUS at 14:48

## 2025-01-09 RX ADMIN — LIDOCAINE HYDROCHLORIDE 50 MG: 20 INJECTION, SOLUTION EPIDURAL; INFILTRATION; INTRACAUDAL; PERINEURAL at 14:02

## 2025-01-09 RX ADMIN — DEXAMETHASONE SODIUM PHOSPHATE 4 MG: 4 INJECTION, SOLUTION INTRAMUSCULAR; INTRAVENOUS at 14:15

## 2025-01-09 RX ADMIN — SODIUM CHLORIDE, POTASSIUM CHLORIDE, SODIUM LACTATE AND CALCIUM CHLORIDE 1000 ML: 600; 310; 30; 20 INJECTION, SOLUTION INTRAVENOUS at 09:37

## 2025-01-09 NOTE — ANESTHESIA PREPROCEDURE EVALUATION
Anesthesia Evaluation     Patient summary reviewed and Nursing notes reviewed   no history of anesthetic complications:   NPO Solid Status: > 8 hours  NPO Liquid Status: > 8 hours           Airway   Mallampati: II  TM distance: >3 FB  Neck ROM: full  No difficulty expected  Dental - normal exam     Pulmonary - normal exam   (+) a smoker Former,  Cardiovascular - normal exam    (+) valvular problems/murmurs, CHF , hyperlipidemia      Neuro/Psych  (+) numbness  GI/Hepatic/Renal/Endo    (+) renal disease-, diabetes mellitus    Musculoskeletal     Abdominal  - normal exam    Bowel sounds: normal.   Substance History      OB/GYN          Other   arthritis,                     Anesthesia Plan    ASA 3     general     intravenous induction     Anesthetic plan, risks, benefits, and alternatives have been provided, discussed and informed consent has been obtained with: patient.  Pre-procedure education provided  Plan discussed with CRNA.      CODE STATUS:

## 2025-01-09 NOTE — NURSING NOTE
Dr. Puente in to see and update that his 1st case is taking longer than expected and could last the rest of the day.  She is understanding and wants to continue to wait.

## 2025-01-09 NOTE — ANESTHESIA POSTPROCEDURE EVALUATION
Patient: Yadira Car    Procedure Summary       Date: 01/09/25 Room / Location: [facility] OR 05 / [facility] MAIN OR    Anesthesia Start: 1358 Anesthesia Stop: 1501    Procedure: ANAL FISTULA  lift (Rectum) Diagnosis:       Anal fissure and fistula      (Anal fissure and fistula [K60.2, K60.30])    Surgeons: Natalio Puente MD Provider: Joel Ortega MD    Anesthesia Type: general ASA Status: 3            Anesthesia Type: general    Vitals  Vitals Value Taken Time   /86 01/09/25 1557   Temp 97.5 °F (36.4 °C) 01/09/25 1556   Pulse 71 01/09/25 1557   Resp 10 01/09/25 1556   SpO2 99 % 01/09/25 1557   Vitals shown include unfiled device data.        Post Anesthesia Care and Evaluation    Patient location during evaluation: PACU  Patient participation: complete - patient participated  Level of consciousness: awake  Pain scale: See nurse's notes for pain score.  Pain management: adequate    Airway patency: patent  Anesthetic complications: No anesthetic complications  PONV Status: none  Cardiovascular status: acceptable  Respiratory status: acceptable and spontaneous ventilation  Hydration status: acceptable    Comments: Patient seen and examined postoperatively; vital signs stable; SpO2 greater than or equal to 90%; cardiopulmonary status stable; nausea/vomiting adequately controlled; pain adequately controlled; no apparent anesthesia complications; patient discharged from anesthesia care when discharge criteria were met

## 2025-01-09 NOTE — H&P
Colorectal Surgery Preop Note    ID:  Yadira Car;     Chief Complaint  Fistula in ano     History of Present Illness  Yadira Car is a 59 y.o. 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  Past Surgical History  Past Surgical History:   Procedure Laterality Date    D & C AND LAPAROSCOPY      ECTOPIC PREGNANCY      INCISION AND DRAINAGE OF WOUND N/A 11/14/2024    Procedure: EXAM UNDER ANESTHESIA, INCISION AND DRAINAGE PERIANAL ABSCESS, FISULOTOMY WITH SETON, FISSURECTOMY;  Surgeon: Natalio Puente MD;  Location: [facility] MAIN OR;  Service: General;  Laterality: N/A;    WISDOM TOOTH EXTRACTION       For further details please see prior notes and Health History Questionnaire scanned in Epic.  Family History  Family History   Problem Relation Age of Onset    Heart disease Mother     Diabetes Mother     Arthritis Mother     Supraventricular tachycardia Mother     Atrial fibrillation Mother     Hypertension Father     Diabetes Father     Stroke Father     Hyperlipidemia Father     No Known Problems Brother     Heart disease Maternal Uncle     Diabetes Maternal Grandmother     Diabetes Maternal Grandfather     Heart disease Maternal Grandfather      For further details please see prior notes and Health History Questionnaire scanned in Epic.  Social History  Social History     Tobacco Use    Smoking status: Former     Current packs/day: 0.50     Average packs/day: 0.5 packs/day for 35.0 years (17.5  ttl pk-yrs)     Types: Cigarettes    Smokeless tobacco: Never    Tobacco comments:     On and Off   Vaping Use    Vaping status: Never Used   Substance Use Topics    Alcohol use: Yes     Comment: once-twice/yr.--caffeine use    Drug use: No     For further details please see prior notes and Health History Questionnaire scanned in Epic.  Medication List    Current Facility-Administered Medications:     atropine sulfate injection 0.5 mg, 0.5 mg, Intravenous, Once PRN, Jack-Himschoot, Linda L, CRNA    diphenhydrAMINE (BENADRYL) injection 12.5 mg, 12.5 mg, Intravenous, Once PRN, Jack-Himschoot, Linda L, CRNA    diphenhydrAMINE (BENADRYL) injection 12.5 mg, 12.5 mg, Intravenous, Q15 Min PRN, Jack-Himschoot, Linda L, CRNA    ePHEDrine Sulfate (Pressors) 5 MG/ML injection 5 mg, 5 mg, Intravenous, Once PRN, Jack-Himschoot, Linda L, CRNA    flumazenil (ROMAZICON) injection 0.2 mg, 0.2 mg, Intravenous, PRN, Jack-Himschoot, Linda L, CRNA    hydrALAZINE (APRESOLINE) injection 5 mg, 5 mg, Intravenous, Q10 Min PRN, Jack-Himschoot, Linda L, CRNA    HYDROmorphone (DILAUDID) injection 0.5 mg, 0.5 mg, Intravenous, Q15 Min PRN, Jack-Himschoot, Linda L, CRNA    HYDROmorphone (DILAUDID) injection 1 mg, 1 mg, Intravenous, Q15 Min PRN, Jack-Himschoot, Linda L, CRNA    ipratropium-albuterol (DUO-NEB) nebulizer solution 3 mL, 3 mL, Nebulization, Once PRN, Jack-Himschoot, Linda L, CRNA    labetalol (NORMODYNE,TRANDATE) injection 5 mg, 5 mg, Intravenous, Q5 Min PRN, Jack-Himschoot, Linda L, CRNA    lidocaine (cardiac) (XYLOCAINE) injection 100 mg, 100 mg, Intravenous, Q5 Min PRN, Jack-Himschoot, Linda L, CRNA    meperidine (DEMEROL) injection 12.5 mg, 12.5 mg, Intravenous, Q5 Min PRN, Linda Vega CRNA    naloxone (NARCAN) injection 0.4 mg, 0.4 mg, Intravenous, PRN, Linda Vega CRNA    oxyCODONE (ROXICODONE) immediate release tablet 10 mg, 10 mg, Oral, Q4H PRN, Linda Vega CRNA    oxyCODONE (ROXICODONE) immediate  release tablet 5 mg, 5 mg, Oral, Once PRN, Linda Vega CRNA    Current Outpatient Medications:     Ascorbic Acid (VITAMIN C PO), Take 1,000 mg by mouth Daily., Disp: , Rfl:     Black Pepper-Turmeric (TURMERIC COMPLEX/BLACK PEPPER PO), Take 1 tablet by mouth Daily., Disp: , Rfl:     Cholecalciferol (VITAMIN D-3 PO), Take 1,000 Units by mouth Daily., Disp: , Rfl:     Cobalamin Combinations (B12 FOLATE PO), Take  by mouth. b12 spray .. spray on tongue three times week, Disp: , Rfl:     Diclofenac Sodium (Voltaren) 1 % gel gel, Apply 1 g topically to the appropriate area as directed 4 (Four) Times a Day., Disp: 300 g, Rfl: 3    diphenhydrAMINE (BENADRYL) 25 mg capsule, Take 1 capsule by mouth Every 6 (Six) Hours As Needed for Itching., Disp: , Rfl:     Ginger, Zingiber officinalis, (GINGER ROOT PO), Take 1 tablet by mouth Daily., Disp: , Rfl:     ibuprofen (ADVIL,MOTRIN) 200 MG tablet, Take 1 tablet by mouth Every 6 (Six) Hours As Needed for Mild Pain., Disp: , Rfl:     Jardiance 25 MG tablet tablet, Take 1 tablet by mouth Daily. Stop Xigduo., Disp: 30 tablet, Rfl: 1    lidocaine (XYLOCAINE) 2 % jelly, 0.5% Nifedipine with 2% lidocaine, apply to rectum 3-4 times daily, Disp: 30 g, Rfl: 1    MAGNESIUM GLYCINATE PO, Take 1 tablet by mouth Daily., Disp: , Rfl:     NON FORMULARY, Sovereign silver  Take one three times weekly, Disp: , Rfl:     Pseudoephedrine-Acetaminophen (SINUTAB SINUS PO), Take 1 tablet by mouth Daily., Disp: , Rfl:     rosuvastatin (CRESTOR) 10 MG tablet, Take 1 tablet by mouth Daily., Disp: 90 tablet, Rfl: 0    VITAMIN E PO, Take 400 Units by mouth Daily., Disp: , Rfl:     ciclopirox (LOPROX) 0.77 % cream, Apply sparingly two times per day to the toe nails, Disp: 60 g, Rfl: 2    oxyCODONE (Roxicodone) 5 MG immediate release tablet, Take 1 tablet by mouth Every 8 (Eight) Hours As Needed for Moderate Pain for up to 30 days., Disp: 30 tablet, Rfl: 0    Ozempic, 0.25 or 0.5 MG/DOSE, 2 MG/3ML  solution pen-injector, Inject 0.25 mg under the skin into the appropriate area as directed Every 7 (Seven) Days., Disp: 3 mL, Rfl: 0  For further details please see prior notes and Health History Questionnaire scanned in Epic.  Allergies  Allergies   Allergen Reactions    Blue Dyes (Parenteral)      Review of Systems  Pertinent positives noted in HPI.    Physical Exam  General:  No acute distress  Head: Normocephalic, atraumatic  CV: Regular rate, no edema, extremities warm and well perfused  Pulm: Symmetric chest rise, non-labored breathing  Neuro: Alert and oriented     Abdomen: Please see clinic note  Anorectal: Please see clinic note    Assessment  -Fistula in ano      Plan / Recommendations    1. Proceed to OR for fistula closure       Natalio Puente MD  Colon and Rectal Surgery   [facility]

## 2025-01-09 NOTE — DISCHARGE INSTRUCTIONS
[facility]  Postoperative Instructions after Anorectal Surgery    After your surgery, the following may occur and SHOULD NOT alarm you:   a) Drainage of bloody discharge (a tablespoonful or less)   b) Some swelling around the anus   c) Soreness, burning, itching, or dull aching   d) Pain with bowel movement   e) Occasional passage of bright red blood   f) Mild fatigue   g) Mild fever (less than 101.4)   h) odor.    Diet:   1. Eat a regular high fiber diet with a considerable amount of cooked vegetables, fruits and fruit juices.  2. Avoid spicy foods.  3. Drink greater than 64 ounces of water or liquids without caffeine daily.    Wound Care:  1. Use ice packs for the first two days following surgery if you feel that it helps your discomfort.  2. Take warm soaks/sitzbaths/showers 1-2 times a day, water as warm as can be tolerated.  3. Expect bleeding/drainage with bowel movements, so wear pads to protect underwear.    Pain Meds:  1. Motrin 600mg every 6 hours as needed  2. Narcotic pain medication as prescribed  3. If nauseated or vomiting, take phenergan or zofran as prescribed (call my office for a prescription).  Try to stay hydrated.  If nausea persists, you need to be seen in the office.   4. Do not drive while taking narcotics    Constipated:   1. To avoid constipation:   A. Take Fiber (example: metamucil) 4 grams daily   B. Miralax 17 grams nightly as needed   C. Milk of Magnesia 2 tablespoons am/pm as needed  2. If bowel movements become too loose, stop MOM but stay on fiber supplements.    No Bowel Movements:  1. If unable to have a bowel movement in 36-48 hours:   A. Take 3 Dulcolax laxative tablets   B. If no bowel movement within 4-6 hours after Dulcolax, take 5 ounces of Magnesium Citrate    If Unable to Urinate:  1. Try to urinate in a hot shower or bath.   2. If still unable to urinate, go to the Emergency Room to have a catheter placed.  The doctor in the office will remove  this catheter in a few days.     When to Call Us:  1. Fever higher than 101.5  2. Persistent nausea and/or vomiting  3. Excessive bleeding (bloody diarrhea).  It is normal to pass some blood with bowel movements but passing a cup of blood at a time is abnormal.  4. Increasing pain not relieved with above instructions.     Follow up:   Generally in 2-3 weeks unless otherwise directed.  Please call to make an appointment.         Natalio Puente MD  Colon and Rectal Surgery   [facility]

## 2025-01-11 NOTE — OP NOTE
[facility] Operative Note   Name: Yadira Car  : 1965    Date of Surgery: 2025  Pre-op Diagnosis: Fistula-in-ano  Post-op Diagnosis: same  Procedure: modified LIFT (Ligation of Intersphincteric Fistulous Tract)   Surgeon: Natalio Puente MD   Assistants: CULLEN  Anesthesia: Local/MAC  IV Fluids: refer to anesthesia record  Estimated Blood Loss: minimal  Drains: none  Implants: none  Specimen:   Findings   1. 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     Complications   No complications  Indication  59 y.o. female with a transsphincteric fistula in ano in the anterior midline position. Patient had prior seton drain placement.    Description of Procedure  Informed consent was obtained and the patient was taken to the OR. Anesthesia was administered. Bhavna prominences were padded. The patient was placed in the prone position. The anus was prepped and draped in sterile fashion.  Local anesthesia was infiltrated to achieve a complete anal block. A Balderrama retractor was placed transanally. On inspection primary opening was located in the anterior midline quadrant. The secondary opening was located in the anterior midline quadrant.  A fistula probe was placed through the secondary opening along the seton already in place from a prior procedure.  Starting from the primary opening the electrocautery was used to divide the internal sphincter muscle over the probe. This was performed to the limit of the intersphincteric groove. No fibers of the external sphincter muscle were divided.     The tract was curretted out.     At this point a 2-0 vicryl suture was used to perform a figure of eight ligation of the fistulous tract at the level of the external sphincter, using the probe to help delineate the fistula tract. The probe was removed and the suture tied down.  Hydrogen peroxide was injected into the secondary opening without any bubbles seen at the level of the ligation. The probe could not be passed into the  tract.    The skin surrounding the secondary opening was judiciously excised. Hemostasis was confirmed and the dressings were applied and the case concluded.    Counts: Instrument, sponge, and needle counts were reported by the scrub nurse to be correct prior to closure and at the conclusion of the case.  Disposition  The patient was taken to Recovery Room in good condition.      Natalio Puente MD  Colon and Rectal Surgery   [facility]

## 2025-01-13 ENCOUNTER — TELEPHONE (OUTPATIENT)
Age: 60
End: 2025-01-13
Payer: COMMERCIAL

## 2025-01-13 DIAGNOSIS — K60.2 ANAL FISSURE: Primary | ICD-10-CM

## 2025-01-13 DIAGNOSIS — K64.8 INTERNAL HEMORRHOIDS WITH COMPLICATION: ICD-10-CM

## 2025-01-13 RX ORDER — TRAMADOL HYDROCHLORIDE 50 MG/1
50 TABLET ORAL EVERY 6 HOURS PRN
Qty: 30 TABLET | Refills: 0 | Status: SHIPPED | OUTPATIENT
Start: 2025-01-13

## 2025-01-13 NOTE — TELEPHONE ENCOUNTER
Caller: Yadira Car    Relationship: Self    Best call back number: [phone]    What is the best time to reach you: ANY    Who are you requesting to speak with (clinical staff, provider,  specific staff member): BLANCA    Do you know the name of the person who called: BLANCA    What was the call regarding: SCHEDULING, HUB UNABLE TO TRANSFER    Is it okay if the provider responds through MyChart: NO

## 2025-01-13 NOTE — TELEPHONE ENCOUNTER
Patient called and is req a different pain medication she stated that the oxicodone and ibuprofen isnt helping very much. Message has been sent to

## 2025-01-13 NOTE — TELEPHONE ENCOUNTER
Caller: CARLOS EDUARDO DURANT    Relationship: PATIENT    Best call back number: [phone]    What is the best time to reach you: ANYTIME    Who are you requesting to speak with (clinical staff, provider,  specific staff member): DARRYL    PATIENT CALLED IN REQUESTING A CALL BACK FROM DARRYL AS SOON AS SHE CAN. PLEASE CALL.

## 2025-01-15 ENCOUNTER — TELEPHONE (OUTPATIENT)
Age: 60
End: 2025-01-15
Payer: COMMERCIAL

## 2025-01-15 NOTE — TELEPHONE ENCOUNTER
patient called and said that she has had multiple suture like pieces fall out since having a bm this morning and is wondering if this is normal.

## 2025-01-17 ENCOUNTER — OFFICE VISIT (OUTPATIENT)
Age: 60
End: 2025-01-17
Payer: COMMERCIAL

## 2025-01-17 VITALS
HEIGHT: 68 IN | OXYGEN SATURATION: 96 % | BODY MASS INDEX: 27.14 KG/M2 | HEART RATE: 65 BPM | TEMPERATURE: 98.4 F | WEIGHT: 179.1 LBS | SYSTOLIC BLOOD PRESSURE: 107 MMHG | DIASTOLIC BLOOD PRESSURE: 69 MMHG | RESPIRATION RATE: 20 BRPM

## 2025-01-17 DIAGNOSIS — K60.2 ANAL FISSURE AND FISTULA: ICD-10-CM

## 2025-01-17 DIAGNOSIS — K60.2 ANAL FISSURE: Primary | ICD-10-CM

## 2025-01-17 DIAGNOSIS — K60.30 ANAL FISSURE AND FISTULA: ICD-10-CM

## 2025-01-17 PROCEDURE — 99024 POSTOP FOLLOW-UP VISIT: CPT | Performed by: STUDENT IN AN ORGANIZED HEALTH CARE EDUCATION/TRAINING PROGRAM

## 2025-01-17 RX ORDER — HYDROCORTISONE ACETATE PRAMOXINE HCL 2.5; 1 G/100G; G/100G
CREAM TOPICAL 3 TIMES DAILY
Qty: 30 G | Refills: 0 | Status: SHIPPED | OUTPATIENT
Start: 2025-01-17

## 2025-01-20 NOTE — PROGRESS NOTES
Colorectal Surgery Followup Note    ID:  Yadira Car;   : 1965  DATE OF VISIT: 2025    Chief Complaint  Post-op (Fistula/ fissure repair  )       Subjective    Ms. Car is status post anal fistula repair.  She presented today for a follow-up.  She reports some swelling and discharge from the surgical area.  She reports some itching and irritation.  She reports her pain has been controlled with tramadol.  She denies any fevers or chills.  Exam  General:  No acute distress  Head: Normocephalic, atraumatic  Neuro: Alert and oriented    External anorectal exam: Surgical area with some swelling and irritation.  There is a bridge of skin over the internal opening fistula closure site.  This was excised sharply.    Assessment  - anal fissure with fistula s/p modified LIFT closure     Plan / Recommendations  -doing well. Pain controlled with tramadol. There was a small bride of skin over the internal opening closure site. This was excised sharply to avoid fistula formation.     - continue with metamucil fiber. Avoid excessive wiping     - analpram prescriptions sent to help with pain and swelling     - follow up in 2-3 weeks       Natalio Puente MD  Colon and Rectal Surgery   [facility]

## 2025-01-31 ENCOUNTER — OFFICE VISIT (OUTPATIENT)
Age: 60
End: 2025-01-31
Payer: COMMERCIAL

## 2025-01-31 VITALS
HEART RATE: 76 BPM | HEIGHT: 68 IN | WEIGHT: 184 LBS | BODY MASS INDEX: 27.89 KG/M2 | OXYGEN SATURATION: 97 % | DIASTOLIC BLOOD PRESSURE: 86 MMHG | TEMPERATURE: 97.8 F | SYSTOLIC BLOOD PRESSURE: 127 MMHG

## 2025-01-31 DIAGNOSIS — K60.2 ANAL FISSURE AND FISTULA: Primary | ICD-10-CM

## 2025-01-31 DIAGNOSIS — K60.30 ANAL FISSURE AND FISTULA: Primary | ICD-10-CM

## 2025-01-31 PROCEDURE — 99024 POSTOP FOLLOW-UP VISIT: CPT | Performed by: STUDENT IN AN ORGANIZED HEALTH CARE EDUCATION/TRAINING PROGRAM

## 2025-01-31 RX ORDER — TRAMADOL HYDROCHLORIDE 50 MG/1
50 TABLET ORAL EVERY 6 HOURS PRN
Qty: 40 TABLET | Refills: 0 | Status: SHIPPED | OUTPATIENT
Start: 2025-01-31 | End: 2025-03-12

## 2025-02-01 NOTE — PROGRESS NOTES
Colorectal Surgery Followup Note    ID:  Yadira Car;   : 1965  DATE OF VISIT: 2025    Chief Complaint  Post-op (PO Fistula repair 2025)       Subjective     Ms. Car is status post anal fistula repair.  She reports feeling better.  She has been applying topical steroids for pruritus around the anus.  She denies any discharge.  She reports significantly improved incontinence.    Exam  General:  No acute distress  Head: Normocephalic, atraumatic  Neuro: Alert and oriented     External anorectal exam: Healing fistula closure site.  No drainage or perianal erythema.  Irritation significantly improved.     Assessment  - anal fissure with fistula s/p modified LIFT closure      Plan / Recommendations  -doing well. Pain controlled with tramadol. Perianal wound healing      - continue with metamucil fiber. Avoid excessive wiping      - follow up in 3-4 weeks         Natalio Puente MD  Colon and Rectal Surgery   [facility]

## 2025-02-05 ENCOUNTER — TELEPHONE (OUTPATIENT)
Age: 60
End: 2025-02-05
Payer: COMMERCIAL

## 2025-02-05 NOTE — TELEPHONE ENCOUNTER
PA request for Tramadol received from [facility] initiated on CoverMyMeds.     PA request completed and submitted. Plan will fax determination typically within 1 to 5 business days per CoverMyMeds.

## 2025-02-25 ENCOUNTER — TRANSCRIBE ORDERS (OUTPATIENT)
Dept: LAB | Facility: HOSPITAL | Age: 60
End: 2025-02-25
Payer: COMMERCIAL

## 2025-02-25 DIAGNOSIS — M54.50 LOW BACK PAIN, UNSPECIFIED BACK PAIN LATERALITY, UNSPECIFIED CHRONICITY, UNSPECIFIED WHETHER SCIATICA PRESENT: ICD-10-CM

## 2025-02-25 DIAGNOSIS — Z00.00 ROUTINE GENERAL MEDICAL EXAMINATION AT HEALTH CARE FACILITY: ICD-10-CM

## 2025-02-25 DIAGNOSIS — E78.81 LIPOID DERMATOARTHRITIS: ICD-10-CM

## 2025-02-25 DIAGNOSIS — E11.9 DIABETES MELLITUS WITHOUT COMPLICATION: Primary | ICD-10-CM

## 2025-02-25 DIAGNOSIS — R60.9 EDEMA, UNSPECIFIED TYPE: ICD-10-CM

## 2025-02-25 DIAGNOSIS — E55.9 VITAMIN D DEFICIENCY DISEASE: ICD-10-CM

## 2025-02-26 ENCOUNTER — OFFICE VISIT (OUTPATIENT)
Age: 60
End: 2025-02-26
Payer: COMMERCIAL

## 2025-02-26 VITALS
DIASTOLIC BLOOD PRESSURE: 84 MMHG | WEIGHT: 182 LBS | SYSTOLIC BLOOD PRESSURE: 138 MMHG | HEART RATE: 74 BPM | OXYGEN SATURATION: 80 % | BODY MASS INDEX: 27.58 KG/M2 | TEMPERATURE: 98.7 F | HEIGHT: 68 IN

## 2025-02-26 DIAGNOSIS — K60.2 ANAL FISSURE: ICD-10-CM

## 2025-02-26 DIAGNOSIS — K60.2 ANAL FISSURE AND FISTULA: Primary | ICD-10-CM

## 2025-02-26 DIAGNOSIS — K60.30 ANAL FISSURE AND FISTULA: Primary | ICD-10-CM

## 2025-02-26 PROCEDURE — 99024 POSTOP FOLLOW-UP VISIT: CPT | Performed by: STUDENT IN AN ORGANIZED HEALTH CARE EDUCATION/TRAINING PROGRAM

## 2025-02-26 NOTE — PROGRESS NOTES
Colorectal Surgery Followup Note    ID:  Yadira Car;   : 1965  DATE OF VISIT: 2025    Chief Complaint  Post-op (PO ANAL FISTULA  lift )       Subjective     Ms. Car doing well.  She denies .  She denies any discharge.  She denies any anorectal pain. She reports no incontinence. No other complaints.      Exam  General:  No acute distress  Head: Normocephalic, atraumatic  Neuro: Alert and oriented     External anorectal exam: well healed fistula repair site.  No drainage or perianal erythema.  No perianal irritation. Sutures have all completed dissolved      Assessment  - anal fissure with fistula s/p modified LIFT closure      Plan / Recommendations  -doing well and has recovered, she is happy with her outcome      - continue with metamucil fiber. Avoid excessive wiping      - colonoscopy next year     - follow up as needed         Natalio Puente MD  Colon and Rectal Surgery   [facility]

## 2025-03-01 ENCOUNTER — LAB (OUTPATIENT)
Dept: LAB | Facility: HOSPITAL | Age: 60
End: 2025-03-01
Payer: COMMERCIAL

## 2025-03-01 DIAGNOSIS — M54.50 LOW BACK PAIN, UNSPECIFIED BACK PAIN LATERALITY, UNSPECIFIED CHRONICITY, UNSPECIFIED WHETHER SCIATICA PRESENT: ICD-10-CM

## 2025-03-01 DIAGNOSIS — R60.9 EDEMA, UNSPECIFIED TYPE: ICD-10-CM

## 2025-03-01 DIAGNOSIS — E55.9 VITAMIN D DEFICIENCY DISEASE: ICD-10-CM

## 2025-03-01 DIAGNOSIS — Z00.00 ROUTINE GENERAL MEDICAL EXAMINATION AT HEALTH CARE FACILITY: ICD-10-CM

## 2025-03-01 DIAGNOSIS — E78.81 LIPOID DERMATOARTHRITIS: ICD-10-CM

## 2025-03-01 DIAGNOSIS — E11.9 DIABETES MELLITUS WITHOUT COMPLICATION: ICD-10-CM

## 2025-03-01 LAB
ALBUMIN SERPL-MCNC: 4.7 G/DL (ref 3.5–5.2)
ALBUMIN UR-MCNC: 1.7 MG/DL
ALBUMIN/GLOB SERPL: 2.1 G/DL
ALP SERPL-CCNC: 80 U/L (ref 39–117)
ALT SERPL W P-5'-P-CCNC: 23 U/L (ref 1–33)
ANION GAP SERPL CALCULATED.3IONS-SCNC: 13 MMOL/L (ref 5–15)
AST SERPL-CCNC: 22 U/L (ref 1–32)
BASOPHILS # BLD AUTO: 0.04 10*3/MM3 (ref 0–0.2)
BASOPHILS NFR BLD AUTO: 0.5 % (ref 0–1.5)
BILIRUB SERPL-MCNC: 0.6 MG/DL (ref 0–1.2)
BUN SERPL-MCNC: 16 MG/DL (ref 6–20)
BUN/CREAT SERPL: 22.9 (ref 7–25)
CALCIUM SPEC-SCNC: 9.5 MG/DL (ref 8.6–10.5)
CHLORIDE SERPL-SCNC: 101 MMOL/L (ref 98–107)
CHOLEST SERPL-MCNC: 147 MG/DL (ref 0–200)
CO2 SERPL-SCNC: 23 MMOL/L (ref 22–29)
CREAT SERPL-MCNC: 0.7 MG/DL (ref 0.57–1)
DEPRECATED RDW RBC AUTO: 46.9 FL (ref 37–54)
EGFRCR SERPLBLD CKD-EPI 2021: 100.4 ML/MIN/1.73
EOSINOPHIL # BLD AUTO: 0.28 10*3/MM3 (ref 0–0.4)
EOSINOPHIL NFR BLD AUTO: 3.3 % (ref 0.3–6.2)
ERYTHROCYTE [DISTWIDTH] IN BLOOD BY AUTOMATED COUNT: 13.4 % (ref 12.3–15.4)
GLOBULIN UR ELPH-MCNC: 2.2 GM/DL
GLUCOSE SERPL-MCNC: 147 MG/DL (ref 65–99)
HBA1C MFR BLD: 8.35 % (ref 4.8–5.6)
HCT VFR BLD AUTO: 47.5 % (ref 34–46.6)
HDLC SERPL-MCNC: 54 MG/DL (ref 40–60)
HGB BLD-MCNC: 15.2 G/DL (ref 12–15.9)
IMM GRANULOCYTES # BLD AUTO: 0.04 10*3/MM3 (ref 0–0.05)
IMM GRANULOCYTES NFR BLD AUTO: 0.5 % (ref 0–0.5)
LDLC SERPL CALC-MCNC: 66 MG/DL (ref 0–100)
LDLC/HDLC SERPL: 1.12 {RATIO}
LYMPHOCYTES # BLD AUTO: 2.08 10*3/MM3 (ref 0.7–3.1)
LYMPHOCYTES NFR BLD AUTO: 24.4 % (ref 19.6–45.3)
MCH RBC QN AUTO: 30.3 PG (ref 26.6–33)
MCHC RBC AUTO-ENTMCNC: 32 G/DL (ref 31.5–35.7)
MCV RBC AUTO: 94.6 FL (ref 79–97)
MONOCYTES # BLD AUTO: 0.61 10*3/MM3 (ref 0.1–0.9)
MONOCYTES NFR BLD AUTO: 7.1 % (ref 5–12)
NEUTROPHILS NFR BLD AUTO: 5.49 10*3/MM3 (ref 1.7–7)
NEUTROPHILS NFR BLD AUTO: 64.2 % (ref 42.7–76)
NRBC BLD AUTO-RTO: 0 /100 WBC (ref 0–0.2)
PLATELET # BLD AUTO: 287 10*3/MM3 (ref 140–450)
PMV BLD AUTO: 10.5 FL (ref 6–12)
POTASSIUM SERPL-SCNC: 3.9 MMOL/L (ref 3.5–5.2)
PROT SERPL-MCNC: 6.9 G/DL (ref 6–8.5)
RBC # BLD AUTO: 5.02 10*6/MM3 (ref 3.77–5.28)
SODIUM SERPL-SCNC: 137 MMOL/L (ref 136–145)
TRIGL SERPL-MCNC: 163 MG/DL (ref 0–150)
TSH SERPL DL<=0.05 MIU/L-ACNC: 1.34 UIU/ML (ref 0.27–4.2)
VLDLC SERPL-MCNC: 27 MG/DL (ref 5–40)
WBC NRBC COR # BLD AUTO: 8.54 10*3/MM3 (ref 3.4–10.8)

## 2025-03-01 PROCEDURE — 80050 GENERAL HEALTH PANEL: CPT

## 2025-03-01 PROCEDURE — 83036 HEMOGLOBIN GLYCOSYLATED A1C: CPT

## 2025-03-01 PROCEDURE — 36415 COLL VENOUS BLD VENIPUNCTURE: CPT

## 2025-03-01 PROCEDURE — 82652 VIT D 1 25-DIHYDROXY: CPT

## 2025-03-01 PROCEDURE — 82043 UR ALBUMIN QUANTITATIVE: CPT

## 2025-03-01 PROCEDURE — 80061 LIPID PANEL: CPT

## 2025-03-04 LAB — 1,25(OH)2D SERPL-MCNC: 30.5 PG/ML (ref 24.8–81.5)

## 2025-03-07 ENCOUNTER — OFFICE VISIT (OUTPATIENT)
Age: 60
End: 2025-03-07
Payer: COMMERCIAL

## 2025-03-07 VITALS
HEIGHT: 67 IN | SYSTOLIC BLOOD PRESSURE: 122 MMHG | DIASTOLIC BLOOD PRESSURE: 70 MMHG | BODY MASS INDEX: 28.28 KG/M2 | WEIGHT: 180.2 LBS | OXYGEN SATURATION: 99 %

## 2025-03-07 DIAGNOSIS — E78.5 HYPERLIPIDEMIA, UNSPECIFIED HYPERLIPIDEMIA TYPE: ICD-10-CM

## 2025-03-07 DIAGNOSIS — I35.0 NONRHEUMATIC AORTIC VALVE STENOSIS: ICD-10-CM

## 2025-03-07 DIAGNOSIS — I50.32 CHRONIC DIASTOLIC CONGESTIVE HEART FAILURE: Primary | ICD-10-CM

## 2025-03-07 DIAGNOSIS — E10.69 TYPE 1 DIABETES MELLITUS WITH OTHER SPECIFIED COMPLICATION: ICD-10-CM

## 2025-03-07 PROCEDURE — 93000 ELECTROCARDIOGRAM COMPLETE: CPT | Performed by: INTERNAL MEDICINE

## 2025-03-07 PROCEDURE — 99214 OFFICE O/P EST MOD 30 MIN: CPT | Performed by: INTERNAL MEDICINE

## 2025-03-07 NOTE — PROGRESS NOTES
Subjective:     Encounter Date:03/07/2025      Patient ID: Yadira Car is a 59 y.o. female.    Chief Complaint:  History of Present Illness    This is a 59-year-old with diabetes, obesity, hyperlipidemia, aortic stenosis, chronic diastolic congestive heart failure, who presents for follow up.      She presents today for annual follow-up.  At her last office visit in 10/2022 she reported that she was feeling really well.  Her pancreas started functioning again and she was able to come off of insulin.  She was now considered a type II diabetic and her blood sugars were just managed with Farxiga.  The patient also was able to lose weight and was feeling much better.  However she had not been exercising but was planning to resume.  She denies any further lower extremity swelling with the weight loss and did not require the furosemide anymore.  The plan was to repeat an echocardiogram 2 years from her last in 3/2021.  Otherwise no changes were made to her management.       She was to follow-up between 10/2022 and 12/2023.  At that office visit she reported spasm-like pain in the left side of her chest lasting 2 to 3 minutes at a time and occurring 2-3 times a week which occurred at rest.  I recommended proceeding with a treadmill stress test and an echocardiogram.  Echocardiogram was performed on 1/3/2024 and showed normal left ventricular function wall motion with an EF of 62%, mild aortic valve stenosis with a peak velocity of 2.05 m/s and mean gradient of 9 mmHg.  Treadmill stress test was negative for ischemia after the patient exercised for 9 minutes.    She presents back today for follow-up.  Unfortunately she developed an anal fistula with abscess and had to undergo a couple surgeries for this.  She is just now recovering from all of that.  She otherwise is doing well from a cardiac standpoint.  She denies any chest pain, shortness of breath, palpitation, orthopnea, near-syncope or syncope.  She reports some  lower extremity swelling at the end of the day after working all day at the desk.  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       Prior History:  I saw the patient initially in 3/2021 when she presented for an evaluation of a murmur.  Patient was diagnosed with type 1 diabetes in 8/2020 during a hospitalization after presenting with complaints of increased thirst, polyuria and hyperglycemia.    During her initial office visit Dr. Lee noted a murmur on exam.  It was recommended that she undergo further cardiac work-up.  At the time of her initial office visit the patient reported that since starting insulin she had gained significant amount of weight.    She reported dyspnea on exertion at the time that she attributes to her weight gain.  She reported lower extremity edema only when sitting for long periods of time.  She did have a family history of TIA and stroke in her father and two-vessel CABG at the age of 48.     At that office visit I recommended proceeding with an echocardiogram.  We did discuss the possibility of pursuing a stress test with her risk factors and her history of dyspnea on exertion.  At the time we opted to hold off on the stress test and just pursue an echocardiogram to start.  Her echocardiogram was performed on 3/22/2021 and showed normal left ventricular systolic function and wall motion with an EF of 60 to 65%, grade 2 diastolic dysfunction, mildly dilated right ventricle, mild aortic stenosis, mild tricuspid regurgitation, and a normal right ventricular systolic pressure of 30 mmHg.  I called her to discuss the results she reported that her breathing was doing better so we opted to hold off on any further work-up or change in management.       Review of Systems   Constitutional: Negative for malaise/fatigue.   HENT:  Negative for hearing loss, hoarse voice, nosebleeds and sore throat.    Eyes:  Negative for pain.   Cardiovascular:  Positive for leg swelling.  Negative for chest pain, claudication, cyanosis, dyspnea on exertion, irregular heartbeat, near-syncope, orthopnea, palpitations, paroxysmal nocturnal dyspnea and syncope.   Respiratory:  Negative for shortness of breath and snoring.    Endocrine: Negative for cold intolerance, heat intolerance, polydipsia, polyphagia and polyuria.   Skin:  Negative for itching and rash.   Musculoskeletal:  Negative for arthritis, falls, joint pain, joint swelling, muscle cramps, muscle weakness and myalgias.   Gastrointestinal:  Negative for constipation, diarrhea, dysphagia, heartburn, hematemesis, hematochezia, melena, nausea and vomiting.   Genitourinary:  Negative for frequency, hematuria and hesitancy.   Neurological:  Negative for excessive daytime sleepiness, dizziness, headaches, light-headedness, numbness and weakness.   Psychiatric/Behavioral:  Negative for depression. The patient is not nervous/anxious.          Current Outpatient Medications:     Ascorbic Acid (VITAMIN C PO), Take 1,000 mg by mouth Daily., Disp: , Rfl:     Black Pepper-Turmeric (TURMERIC COMPLEX/BLACK PEPPER PO), Take 1 tablet by mouth Daily., Disp: , Rfl:     Cholecalciferol (VITAMIN D-3 PO), Take 1,000 Units by mouth Daily., Disp: , Rfl:     ciclopirox (LOPROX) 0.77 % cream, Apply sparingly two times per day to the toe nails, Disp: 60 g, Rfl: 2    Cobalamin Combinations (B12 FOLATE PO), Take  by mouth. b12 spray .. spray on tongue three times week, Disp: , Rfl:     Diclofenac Sodium (Voltaren) 1 % gel gel, Apply 1 g topically to the appropriate area as directed 4 (Four) Times a Day., Disp: 300 g, Rfl: 3    Diclofenac Sodium (VOLTAREN) 1 % gel gel, Apply 1 Application topically 4 times per Day TO KNEES, Disp: 300 g, Rfl: 3    diphenhydrAMINE (BENADRYL) 25 mg capsule, Take 1 capsule by mouth Every 6 (Six) Hours As Needed for Itching., Disp: , Rfl:     fluconazole (Diflucan) 100 MG tablet, Take 1 Tablet orally once per Day for 3 Days, Disp: 3 tablet,  Rfl: 0    Ginger, Zingiber officinalis, (GINGER ROOT PO), Take 1 tablet by mouth Daily., Disp: , Rfl:     Hydrocortisone, Perianal, (Procto-Med HC) 2.5 % rectal cream, Apply three times daily to the perineum as directed., Disp: 28 g, Rfl: 1    ibuprofen (ADVIL,MOTRIN) 200 MG tablet, Take 1 tablet by mouth Every 6 (Six) Hours As Needed for Mild Pain., Disp: , Rfl:     MAGNESIUM GLYCINATE PO, Take 1 tablet by mouth Daily., Disp: , Rfl:     NON FORMULARY, Sovereign silver  Take one three times weekly, Disp: , Rfl:     Pseudoephedrine-Acetaminophen (SINUTAB SINUS PO), Take 1 tablet by mouth Daily., Disp: , Rfl:     rosuvastatin (CRESTOR) 10 MG tablet, Take 1 tablet by mouth Daily., Disp: 90 tablet, Rfl: 0    VITAMIN E PO, Take 400 Units by mouth Daily., Disp: , Rfl:     Xigduo XR  MG tablet, Take 1 tablet by mouth Daily., Disp: 90 tablet, Rfl: 0    Past Medical History:   Diagnosis Date    Allergic     Allergies     to blue dye    Arthritis     Back injury     Diabetes 1.5, 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    Diabetes mellitus 08/2020    New onset    Diabetes mellitus type I     Dyslipidemia     Hyperlipidemia     Low back pain     Neck injury     Renal calculi     from calcium    Spinal stenosis     Systolic murmur     UTI (urinary tract infection)     history of       Past Surgical History:   Procedure Laterality Date    D & C AND LAPAROSCOPY      ECTOPIC PREGNANCY      INCISION AND DRAINAGE OF WOUND N/A 11/14/2024    Procedure: EXAM UNDER ANESTHESIA, INCISION AND DRAINAGE PERIANAL ABSCESS, FISULOTOMY WITH SETON, FISSURECTOMY;  Surgeon: Natalio Puente MD;  Location: [facility] MAIN OR;  Service: General;  Laterality: N/A;    RECTAL FISTULOTOMY N/A 1/9/2025    Procedure: ANAL FISTULA  lift;  Surgeon: Natalio Puente MD;  Location: [facility] MAIN OR;  Service: General;  Laterality: N/A;    WISDOM TOOTH EXTRACTION         Family History   Problem Relation Age of Onset    Heart disease Mother     Diabetes Mother     Arthritis  "Mother     Supraventricular tachycardia Mother     Atrial fibrillation Mother     Hypertension Father     Diabetes Father     Stroke Father     Hyperlipidemia Father     No Known Problems Brother     Heart disease Maternal Uncle     Diabetes Maternal Grandmother     Diabetes Maternal Grandfather     Heart disease Maternal Grandfather        Social History     Tobacco Use    Smoking status: Former     Current packs/day: 0.50     Average packs/day: 0.5 packs/day for 35.0 years (17.5 ttl pk-yrs)     Types: Cigarettes    Smokeless tobacco: Never    Tobacco comments:     On and Off   Vaping Use    Vaping status: Never Used   Substance Use Topics    Alcohol use: Yes     Comment: once-twice/yr.--caffeine use    Drug use: No         ECG 12 Lead    Date/Time: 3/7/2025 3:25 PM  Performed by: Ally Thompson MD    Authorized by: Ally Thompson MD  Comparison: compared with previous ECG   Similar to previous ECG  Rhythm: sinus rhythm             Objective:     Visit Vitals  /70 (BP Location: Right arm, Patient Position: Sitting, Cuff Size: Adult)   Ht 170.2 cm (67\")   Wt 81.7 kg (180 lb 3.2 oz)   SpO2 99%   BMI 28.22 kg/m²         Constitutional:       Appearance: Normal appearance. Well-developed.   Eyes:      General: Lids are normal.      Conjunctiva/sclera: Conjunctivae normal.      Pupils: Pupils are equal, round, and reactive to light.   HENT:      Head: Normocephalic and atraumatic.   Neck:      Vascular: No carotid bruit or JVD.      Lymphadenopathy: No cervical adenopathy.   Pulmonary:      Effort: Pulmonary effort is normal.      Breath sounds: Normal breath sounds.   Cardiovascular:      Normal rate. Regular rhythm.      No gallop.    Pulses:     Radial: 2+ bilaterally.  Edema:     Peripheral edema absent.   Abdominal:      Palpations: Abdomen is soft.   Musculoskeletal:      Cervical back: Full passive range of motion without pain, normal range of motion and neck supple. Skin:     General: Skin is warm and " dry.   Neurological:      Mental Status: Alert and oriented to person, place, and time.             Assessment:          Diagnosis Plan   1. Chronic diastolic congestive heart failure        2. Nonrheumatic aortic valve stenosis        3. Hyperlipidemia, unspecified hyperlipidemia type        4. Type 1 diabetes mellitus with other specified complication               Plan:       1.  Aortic valve stenosis.  Mild on her last echocardiogram in 1/2024.  Will plan on rechecking another echocardiogram in 1 to 2 years for routine surveillance.  2.  Chronic diastolic congestive heart failure.  No evidence of volume overload off of any diuretics.  3.  Hyperlipidemia.  On rosuvastatin which is managed by Dr. Lee.  4.  Diabetes    Will plan on seeing the patient back again in 1 year or sooner if further issues arise.

## 2025-05-22 ENCOUNTER — LAB (OUTPATIENT)
Dept: LAB | Facility: HOSPITAL | Age: 60
End: 2025-05-22
Payer: COMMERCIAL

## 2025-05-22 ENCOUNTER — TRANSCRIBE ORDERS (OUTPATIENT)
Dept: LAB | Facility: HOSPITAL | Age: 60
End: 2025-05-22
Payer: COMMERCIAL

## 2025-05-22 DIAGNOSIS — E11.9 DIABETES MELLITUS WITHOUT COMPLICATION: ICD-10-CM

## 2025-05-22 DIAGNOSIS — E55.9 VITAMIN D DEFICIENCY: Primary | ICD-10-CM

## 2025-05-22 DIAGNOSIS — E55.9 VITAMIN D DEFICIENCY: ICD-10-CM

## 2025-05-22 LAB
25(OH)D3 SERPL-MCNC: 58.6 NG/ML (ref 30–100)
ALBUMIN SERPL-MCNC: 4.7 G/DL (ref 3.5–5.2)
ALBUMIN/GLOB SERPL: 2 G/DL
ALP SERPL-CCNC: 88 U/L (ref 39–117)
ALT SERPL W P-5'-P-CCNC: 17 U/L (ref 1–33)
ANION GAP SERPL CALCULATED.3IONS-SCNC: 14 MMOL/L (ref 5–15)
AST SERPL-CCNC: 16 U/L (ref 1–32)
BILIRUB SERPL-MCNC: 0.6 MG/DL (ref 0–1.2)
BUN SERPL-MCNC: 13 MG/DL (ref 6–20)
BUN/CREAT SERPL: 18.8 (ref 7–25)
CALCIUM SPEC-SCNC: 9.9 MG/DL (ref 8.6–10.5)
CHLORIDE SERPL-SCNC: 104 MMOL/L (ref 98–107)
CO2 SERPL-SCNC: 22 MMOL/L (ref 22–29)
CREAT SERPL-MCNC: 0.69 MG/DL (ref 0.57–1)
EGFRCR SERPLBLD CKD-EPI 2021: 100.7 ML/MIN/1.73
GLOBULIN UR ELPH-MCNC: 2.4 GM/DL
GLUCOSE SERPL-MCNC: 241 MG/DL (ref 65–99)
HBA1C MFR BLD: 7.7 % (ref 4.8–5.6)
POTASSIUM SERPL-SCNC: 4.4 MMOL/L (ref 3.5–5.2)
PROT SERPL-MCNC: 7.1 G/DL (ref 6–8.5)
SODIUM SERPL-SCNC: 140 MMOL/L (ref 136–145)

## 2025-05-22 PROCEDURE — 80053 COMPREHEN METABOLIC PANEL: CPT

## 2025-05-22 PROCEDURE — 83036 HEMOGLOBIN GLYCOSYLATED A1C: CPT

## 2025-05-22 PROCEDURE — 82306 VITAMIN D 25 HYDROXY: CPT

## 2025-05-22 PROCEDURE — 36415 COLL VENOUS BLD VENIPUNCTURE: CPT

## 2025-05-23 ENCOUNTER — TRANSCRIBE ORDERS (OUTPATIENT)
Dept: ADMINISTRATIVE | Facility: HOSPITAL | Age: 60
End: 2025-05-23
Payer: COMMERCIAL

## 2025-05-23 DIAGNOSIS — Z12.31 ENCOUNTER FOR SCREENING MAMMOGRAM FOR MALIGNANT NEOPLASM OF BREAST: ICD-10-CM

## 2025-05-23 DIAGNOSIS — Z00.00 ROUTINE MEDICAL EXAM: Primary | ICD-10-CM

## 2025-05-23 DIAGNOSIS — N95.9 MENOPAUSAL AND POSTMENOPAUSAL DISORDER: ICD-10-CM

## 2025-05-23 DIAGNOSIS — E55.9 VITAMIN D DEFICIENCY DISEASE: ICD-10-CM

## 2025-05-29 ENCOUNTER — TRANSCRIBE ORDERS (OUTPATIENT)
Dept: LAB | Facility: HOSPITAL | Age: 60
End: 2025-05-29
Payer: COMMERCIAL

## 2025-05-29 DIAGNOSIS — Z00.00 ROUTINE MEDICAL EXAM: ICD-10-CM

## 2025-05-29 DIAGNOSIS — M54.50 LOW BACK PAIN, UNSPECIFIED BACK PAIN LATERALITY, UNSPECIFIED CHRONICITY, UNSPECIFIED WHETHER SCIATICA PRESENT: ICD-10-CM

## 2025-05-29 DIAGNOSIS — E55.9 VITAMIN D DEFICIENCY: ICD-10-CM

## 2025-05-29 DIAGNOSIS — E78.81 LIPOID DERMATOARTHRITIS: ICD-10-CM

## 2025-05-29 DIAGNOSIS — E11.9 DIABETES MELLITUS WITHOUT COMPLICATION: Primary | ICD-10-CM

## 2025-05-29 DIAGNOSIS — R60.9 EDEMA, UNSPECIFIED TYPE: ICD-10-CM

## 2025-06-20 ENCOUNTER — HOSPITAL ENCOUNTER (OUTPATIENT)
Dept: MAMMOGRAPHY | Facility: HOSPITAL | Age: 60
Discharge: HOME | End: 2025-06-20
Admitting: FAMILY MEDICINE
Payer: COMMERCIAL

## 2025-06-20 DIAGNOSIS — Z00.00 ROUTINE MEDICAL EXAM: ICD-10-CM

## 2025-06-20 DIAGNOSIS — Z12.31 ENCOUNTER FOR SCREENING MAMMOGRAM FOR MALIGNANT NEOPLASM OF BREAST: ICD-10-CM

## 2025-06-20 PROCEDURE — 77063 BREAST TOMOSYNTHESIS BI: CPT

## 2025-06-20 PROCEDURE — 77067 SCR MAMMO BI INCL CAD: CPT

## 2025-06-29 ENCOUNTER — HOSPITAL ENCOUNTER (EMERGENCY)
Facility: HOSPITAL | Age: 60
Discharge: HOME | End: 2025-06-29
Attending: EMERGENCY MEDICINE | Admitting: EMERGENCY MEDICINE
Payer: COMMERCIAL

## 2025-06-29 ENCOUNTER — APPOINTMENT (OUTPATIENT)
Dept: CT IMAGING | Facility: HOSPITAL | Age: 60
End: 2025-06-29
Payer: COMMERCIAL

## 2025-06-29 VITALS
SYSTOLIC BLOOD PRESSURE: 119 MMHG | WEIGHT: 180.8 LBS | DIASTOLIC BLOOD PRESSURE: 72 MMHG | OXYGEN SATURATION: 99 % | BODY MASS INDEX: 28.38 KG/M2 | RESPIRATION RATE: 14 BRPM | HEIGHT: 67 IN | HEART RATE: 67 BPM | TEMPERATURE: 97.8 F

## 2025-06-29 DIAGNOSIS — S06.0X0A CONCUSSION WITHOUT LOSS OF CONSCIOUSNESS, INITIAL ENCOUNTER: Primary | ICD-10-CM

## 2025-06-29 LAB
ANION GAP SERPL CALCULATED.3IONS-SCNC: 14.8 MMOL/L (ref 5–15)
BUN SERPL-MCNC: 14.5 MG/DL (ref 6–20)
BUN/CREAT SERPL: 25 (ref 7–25)
CALCIUM SPEC-SCNC: 9.7 MG/DL (ref 8.6–10.5)
CHLORIDE SERPL-SCNC: 103 MMOL/L (ref 98–107)
CO2 SERPL-SCNC: 22.2 MMOL/L (ref 22–29)
CREAT SERPL-MCNC: 0.58 MG/DL (ref 0.57–1)
EGFRCR SERPLBLD CKD-EPI 2021: 105 ML/MIN/1.73
GLUCOSE SERPL-MCNC: 143 MG/DL (ref 65–99)
HOLD SPECIMEN: NORMAL
HOLD SPECIMEN: NORMAL
MAGNESIUM SERPL-MCNC: 2.2 MG/DL (ref 1.6–2.6)
POTASSIUM SERPL-SCNC: 4.2 MMOL/L (ref 3.5–5.2)
SODIUM SERPL-SCNC: 140 MMOL/L (ref 136–145)
WHOLE BLOOD HOLD COAG: NORMAL
WHOLE BLOOD HOLD SPECIMEN: NORMAL

## 2025-06-29 PROCEDURE — 83735 ASSAY OF MAGNESIUM: CPT | Performed by: EMERGENCY MEDICINE

## 2025-06-29 PROCEDURE — 97161 PT EVAL LOW COMPLEX 20 MIN: CPT | Performed by: PHYSICAL THERAPIST

## 2025-06-29 PROCEDURE — 36415 COLL VENOUS BLD VENIPUNCTURE: CPT

## 2025-06-29 PROCEDURE — 99284 EMERGENCY DEPT VISIT MOD MDM: CPT

## 2025-06-29 PROCEDURE — 80048 BASIC METABOLIC PNL TOTAL CA: CPT | Performed by: EMERGENCY MEDICINE

## 2025-06-29 PROCEDURE — 70450 CT HEAD/BRAIN W/O DYE: CPT

## 2025-06-29 PROCEDURE — 95992 CANALITH REPOSITIONING PROC: CPT | Performed by: PHYSICAL THERAPIST

## 2025-06-29 PROCEDURE — 72125 CT NECK SPINE W/O DYE: CPT

## 2025-06-29 PROCEDURE — 70486 CT MAXILLOFACIAL W/O DYE: CPT

## 2025-06-29 RX ORDER — SODIUM CHLORIDE 0.9 % (FLUSH) 0.9 %
10 SYRINGE (ML) INJECTION AS NEEDED
Status: DISCONTINUED | OUTPATIENT
Start: 2025-06-29 | End: 2025-06-29 | Stop reason: HOSPADM

## 2025-06-29 RX ORDER — ONDANSETRON 4 MG/1
4 TABLET, ORALLY DISINTEGRATING ORAL EVERY 8 HOURS PRN
Qty: 12 TABLET | Refills: 0 | Status: SHIPPED | OUTPATIENT
Start: 2025-06-29

## 2025-06-29 RX ORDER — ONDANSETRON 4 MG/1
4 TABLET, ORALLY DISINTEGRATING ORAL EVERY 8 HOURS PRN
Qty: 12 TABLET | Refills: 0 | Status: SHIPPED | OUTPATIENT
Start: 2025-06-29 | End: 2025-06-29

## 2025-06-29 NOTE — PLAN OF CARE
ASSESSMENT:   Pt presents with a PT diagnosis of horizontal canal BPPV and has neck pain and stiffness that are limiting her ability to perform ADLs. She tolerated canalith repositioning maneuvers well today and had decreased symptoms following. Ed on post concussive symptoms, blue light glasses for screens, and VOR  hypofunction that commonly occurs after concussion. Issued HEP for neck pain. Recommend OPPT at DC.  Pt wishing to wait for formal order.     Goals:   LTG 1: The patient will be independent in HEP in order to decrease pain and improve tolerance to functional activities.  STATUS: Met    Interventions:   Manual Therapy: CRT horizontal, posterior, and anterior canals     Therapeutic Exercises: chin tuck, levator stretch, scap retraction    Modalities: none      PLAN:  home with OPPT

## 2025-06-29 NOTE — THERAPY EVALUATION
Patient Name: Yadira Car  : 1965    MRN: [id number]                              Today's Date: 2025       Admit Date: 2025    Visit Dx:     ICD-10-CM ICD-9-CM   1. Concussion without loss of consciousness, initial encounter  S06.0X0A 850.0     Patient Active Problem List   Diagnosis    Hypertrophic polyarthritis    HLD (hyperlipidemia)    Neuralgia neuritis, sciatic nerve    Spinal stenosis    Diabetes mellitus, new onset    Yeast vaginitis    Murmur    Nonrheumatic aortic valve stenosis    Chronic diastolic congestive heart failure    DM (diabetes mellitus), type 1    Anal fissure and fistula     Past Medical History:   Diagnosis Date    Allergic     Allergies     to blue dye    Arthritis     Back injury     Diabetes 1.5, managed as type 2 2022    Diabetes mellitus 2020    New onset    Diabetes mellitus type I     Dyslipidemia     Hyperlipidemia     Low back pain     Neck injury     Renal calculi     from calcium    Spinal stenosis     Systolic murmur     UTI (urinary tract infection)     history of     Past Surgical History:   Procedure Laterality Date    D & C AND LAPAROSCOPY      ECTOPIC PREGNANCY      INCISION AND DRAINAGE OF WOUND N/A 2024    Procedure: EXAM UNDER ANESTHESIA, INCISION AND DRAINAGE PERIANAL ABSCESS, FISULOTOMY WITH SETON, FISSURECTOMY;  Surgeon: Natalio Puente MD;  Location: [facility] MAIN OR;  Service: General;  Laterality: N/A;    RECTAL FISTULOTOMY N/A 2025    Procedure: ANAL FISTULA  lift;  Surgeon: Natalio Puente MD;  Location: [facility] MAIN OR;  Service: General;  Laterality: N/A;    WISDOM TOOTH EXTRACTION         SUBJECTIVE:    Pt with head injury after falling into the floor face first last night. Threw up three times last night and then again this morning. HA this morning remains. Some dizziness / off balance feeling.     OBJECTIVE:  Imaging: negative for acute process     AROM - neck 25% limited SB and rot   PROM - dec SB left   MMT UE - 4/5 grossly    SPECIAL TESTING   Spurlings - engative    Vertebral aa test - negative    Compression - inc pain    Distraction - dec pain    Nerve glides  - negative   POSTURE - mild kyphosis   SENSATION - intact   PALPATION - pain UT and levator L       Vestibular Exam    Smooth pursuit: normal   Spontaneous nystagmus: negative   Gaze holding nystagmus: positive to left   Saccades: normal   Convergence/divergence: normal   VOR slow: normal but inc dizziness   Head thrust test: negative   Head shaking nystagmus test: negative   Martin hallpike for posterior canal: positive left   Roll test for horizontal canal: positive     ASSESSMENT:   Pt presents with a PT diagnosis of horizontal canal BPPV and has neck pain and stiffness that are limiting her ability to perform ADLs. She tolerated canalith repositioning maneuvers well today and had decreased symptoms following. Ed on post concussive symptoms, blue light glasses for screens, and VOR  hypofunction that commonly occurs after concussion. Issued HEP for neck pain. Recommend OPPT at [facility].  Pt wishing to wait for formal order.     Goals:   LTG 1: The patient will be independent in HEP in order to decrease pain and improve tolerance to functional activities.  STATUS: Met    Interventions:   Manual Therapy: CRT horizontal, posterior, and anterior canals     Therapeutic Exercises: chin tuck, levator stretch, scap retraction    Modalities: none      PLAN:  home with OPPT        Time Calculation:   PT Evaluation Complexity  History, PT Evaluation Complexity: 1-2 personal factors and/or comorbidities  Examination of Body Systems (PT Eval Complexity): 1-2 elements  Clinical Presentation (PT Evaluation Complexity): stable  Clinical Decision Making (PT Evaluation Complexity): low complexity  Overall Complexity (PT Evaluation Complexity): low complexity     PT Charges       Row Name 06/29/25 8292             Time Calculation    Start Time 1050  -AD      Stop Time 1112  -AD      Time Calculation  (min) 22 min  -AD      PT Received On 06/29/25  -AD         Time Calculation- PT    Total Timed Code Minutes- PT 0 minute(s)  -AD                User Key  (r) = Recorded By, (t) = Taken By, (c) = Cosigned By      Initials Name Provider Type    Maria Antonia Peck, VAHID Physical Therapist                  Therapy Charges for Today       Code Description Service Date Service Provider Modifiers Qty    10592165345 HC PT EVAL LOW COMPLEXITY 4 6/29/2025 Maria Antonia Strauss, PT GP 1    91559647839 HC PT CANALITH REPOSITIONING PER DAY 6/29/2025 Maria Antonia Strauss, PT GP 1               PT Discharge Summary  Anticipated Discharge Disposition (PT): home with outpatient therapy services    Maria Antonia Strauss PT  6/29/2025

## 2025-06-29 NOTE — ED PROVIDER NOTES
Subjective   History of Present Illness  Patient is a 60y/o with a history of diabetes and prior use of rosuvastatin who presents after a mechanical fall yesterday, resulting in facial trauma. She reports that the fall was precipitated by severe cramping in her calves upon awakening, which she attributes to her rosuvastatin. Upon standing to relieve the cramps, she lost her balance and fell, striking her nose, forehead, chin, and knees. She experienced her first episode of epistaxis following the fall. Last night, she had three episodes of vomiting and persistent nausea, and she awoke every two hours overnight, which is not typical for her. This morning, she again felt nauseated upon descending the stairs. She denies current nausea but requests an emesis bag for precaution. She has been on rosuvastatin since 2020.  Review of Systems  See HPI.  Past Medical History:   Diagnosis Date    Allergic     Allergies     to blue dye    Arthritis     Back injury     Diabetes 1.5, 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    Diabetes mellitus 08/2020    New onset    Diabetes mellitus type I     Dyslipidemia     Hyperlipidemia     Low back pain     Neck injury     Renal calculi     from calcium    Spinal stenosis     Systolic murmur     UTI (urinary tract infection)     history of       Allergies   Allergen Reactions    Blue Dyes (Parenteral)     Echinacea Purpurea (Purple Cone Flower) [Echinacea] Itching     Purple dye and Black Dye.    Green Dye Rash       Past Surgical History:   Procedure Laterality Date    D & C AND LAPAROSCOPY      ECTOPIC PREGNANCY      INCISION AND DRAINAGE OF WOUND N/A 11/14/2024    Procedure: EXAM UNDER ANESTHESIA, INCISION AND DRAINAGE PERIANAL ABSCESS, FISULOTOMY WITH SETON, FISSURECTOMY;  Surgeon: Natalio Puente MD;  Location: [facility] MAIN OR;  Service: General;  Laterality: N/A;    RECTAL FISTULOTOMY N/A 01/09/2025    Procedure: ANAL FISTULA  lift;  Surgeon: Natalio Puente MD;  Location: [facility] MAIN OR;  Service:  "General;  Laterality: N/A;    WISDOM TOOTH EXTRACTION         Family History   Problem Relation Age of Onset    Heart disease Mother     Diabetes Mother     Arthritis Mother     Supraventricular tachycardia Mother     Atrial fibrillation Mother     Hypertension Father     Diabetes Father     Stroke Father     Hyperlipidemia Father     No Known Problems Brother     Heart disease Maternal Uncle     Diabetes Maternal Grandmother     Diabetes Maternal Grandfather     Heart disease Maternal Grandfather        Social History     Socioeconomic History    Marital status:    Tobacco Use    Smoking status: Former     Current packs/day: 0.50     Average packs/day: 0.5 packs/day for 35.0 years (17.5 ttl pk-yrs)     Types: Cigarettes    Smokeless tobacco: Never    Tobacco comments:     On and Off   Vaping Use    Vaping status: Never Used   Substance and Sexual Activity    Alcohol use: Yes     Comment: once-twice/yr.--caffeine use    Drug use: No    Sexual activity: Yes     Partners: Male     Birth control/protection: None           Objective   Physical Exam  No acute distress. Patient is alert and conversant. Evidence of facial trauma with mild swelling and contusion noted on the nose and forehead. No septal hematoma, no \"raccoon eyes,\" and no malocclusion. Moist oral mucosa. No observable neck masses on external visualization. No scleral icterus. No respiratory distress, tachypnea, or increased work of breathing. Normal heart rate with intact distal pulses. Abdomen is soft and nontender without peritoneal signs. Reports knee pain and is able to ambulate but cannot kneel. Speech is normal.  Procedures           ED Course        Vitals:    06/29/25 1212   BP: 119/72   Pulse: 67   Resp: 14   Temp:    SpO2: 99%     Labs Reviewed   BASIC METABOLIC PANEL - Abnormal; Notable for the following components:       Result Value    Glucose 143 (*)     All other components within normal limits    Narrative:     GFR Categories in " Chronic Kidney Disease (CKD)              GFR Category          GFR (mL/min/1.73)    Interpretation  G1                    90 or greater        Normal or high (1)  G2                    60-89                Mild decrease (1)  G3a                   45-59                Mild to moderate decrease  G3b                   30-44                Moderate to severe decrease  G4                    15-29                Severe decrease  G5                    14 or less           Kidney failure    (1)In the absence of evidence of kidney disease, neither GFR category G1 or G2 fulfill the criteria for CKD.    eGFR calculation 2021 CKD-EPI creatinine equation, which does not include race as a factor   MAGNESIUM - Normal   EXTRA TUBES    Narrative:     The following orders were created for panel order Extra Tubes.  Procedure                               Abnormality         Status                     ---------                               -----------         ------                     Lavender Top[[id number]]                                     Final result               Gold Top - SST[[id number]]                                   Final result               Green Top (Gel)[[id number]]                                  Final result               Light Blue Top[[id number]]                                   Final result                 Please view results for these tests on the individual orders.   LAVENDER TOP   GOLD TOP - SST   GREEN TOP   LIGHT BLUE TOP     CT Head Without Contrast   Final Result   Impression:      1. No acute intracranial abnormality.         Electronically Signed: Benja Robles MD     6/29/2025 9:24 AM EDT     Workstation ID: [id number]      CT Facial Bones Without Contrast   Final Result   Impression:      1. No evidence of acute facial bone fracture.            Electronically Signed: Benja Robles MD     6/29/2025 9:26 AM EDT     Workstation ID: [id number]      CT Cervical Spine Without Contrast   Final Result    Impression:   1. Negative for displaced fracture or traumatic malalignment of the cervical spine.   2. Multilevel cervical spondylosis detailed above.   3. Minimal emphysematous change. Emphysema on CT is an independent risk factor for lung cancer. Low dose lung cancer screening should be considered if patient qualifies based on smoking history or if not already enrolled in a screening program.         Electronically Signed: Christopher Perez MD     6/29/2025 9:53 AM EDT     Workstation ID: [id number]                                                         Medical Decision Making  Problems Addressed:  Concussion without loss of consciousness, initial encounter: complicated acute illness or injury    Amount and/or Complexity of Data Reviewed  Radiology: ordered.    Risk  Prescription drug management.    My interpretation of CT head is no SDH.  See system for radiology interpretations.      Imaging negative.  Reassuring vitals and exam.  Reassuring labs.  Will dc.  Likely post concussive syndrome.  RTER precautions discussed.    Final diagnoses:   Concussion without loss of consciousness, initial encounter       ED Disposition  ED Disposition       ED Disposition   Discharge    Condition   Stable    Comment   --               Jennifer Lee MD  [address] [address]  [phone]    In 3 days           Medication List        New Prescriptions      ondansetron ODT 4 MG disintegrating tablet  Commonly known as: ZOFRAN-ODT  Place 1 tablet on the tongue Every 8 (Eight) Hours As Needed for Nausea or Vomiting.               Where to Get Your Medications        These medications were sent to [facility] [place] [address]      Hours: Monday to Friday 7 AM to 6 PM, Saturday & Sunday 8 AM to 4:30 PM (Closed 12 PM to 12:30 PM) Phone: [phone]   ondansetron ODT 4 MG disintegrating tablet            Enrike Bolton MD  06/29/25 9779

## 2025-07-30 ENCOUNTER — PATIENT OUTREACH (OUTPATIENT)
Dept: CASE MANAGEMENT | Facility: OTHER | Age: 60
End: 2025-07-30
Payer: COMMERCIAL

## 2025-07-30 NOTE — OUTREACH NOTE
AMBULATORY CASE MANAGEMENT NOTE    Names and Relationships of Patient/Support Persons: Contact: Yadira Car; Relationship: Self -     Patient Outreach  Telephone call with patient. Offered Employee Case Management. Patient declined at this time. Encouraged to reach out in the future if needed.     Education Documentation  No documentation found.        ANIA KO  Ambulatory Case Management    7/30/2025, 11:19 EDT

## (undated) DEVICE — SOLUTION,WATER,IRRIGATION,1000ML,STERILE: Brand: MEDLINE

## (undated) DEVICE — KT SURG TURNOVER 050

## (undated) DEVICE — SYR LUERLOK 30CC

## (undated) DEVICE — UNDRGLV SURG BIOGEL PIMICROINDICATOR SYNTH SZ7.5PF STRL PR/2

## (undated) DEVICE — GLOVE,SURG,SENSICARE SLT,LF,PF,6.5: Brand: MEDLINE

## (undated) DEVICE — ANTIBACTERIAL UNDYED BRAIDED (POLYGLACTIN 910), SYNTHETIC ABSORBABLE SUTURE: Brand: COATED VICRYL

## (undated) DEVICE — SYR LUERLOK 20CC BX/50

## (undated) DEVICE — MATERNITY KNIT PANTS,SEAMLESS: Brand: WINGS

## (undated) DEVICE — SYR LL TP 10ML STRL

## (undated) DEVICE — VESSEL LOOPS X-RAY DETECTABLE: Brand: DEROYAL

## (undated) DEVICE — THE STERILE CAMERA HANDLE COVER IS FOR USE WITH THE STERIS SURGICAL LIGHTING AND VISUALIZATION SYSTEMS.

## (undated) DEVICE — NDL HYPO ECLPS SFTY 22G 1 1/2IN

## (undated) DEVICE — SYR ANGIO FNGR FIX CONTRL MLL 10ML

## (undated) DEVICE — PK MINOR LAPAROTOMY 50

## (undated) DEVICE — GAUZE SPONGES,8 PLY: Brand: CURITY

## (undated) DEVICE — MICRODISSECTION NEEDLE STRAIGHT SLEEVE: Brand: COLORADO

## (undated) DEVICE — WET SKIN PREP TRAY: Brand: MEDLINE INDUSTRIES, INC.

## (undated) DEVICE — THE STERILE LIGHT HANDLE COVER IS USED WITH STERIS SURGICAL LIGHTING AND VISUALIZATION SYSTEMS.

## (undated) DEVICE — GAUZE,SPONGE,FLUFF,6"X6.75",STRL,5/TRAY: Brand: MEDLINE

## (undated) DEVICE — PANTY KNIT MATERN L/XL

## (undated) DEVICE — SYR 10ML

## (undated) DEVICE — NDL HYPO PRECISIONGLIDE REG 25G 1 1/2

## (undated) DEVICE — SPNG LAP PREWSH SFTPK 18X18IN STRL PK/5

## (undated) DEVICE — SUT GUT CHRM SH1 27IN